# Patient Record
Sex: MALE | Race: BLACK OR AFRICAN AMERICAN | NOT HISPANIC OR LATINO | Employment: UNEMPLOYED | URBAN - METROPOLITAN AREA
[De-identification: names, ages, dates, MRNs, and addresses within clinical notes are randomized per-mention and may not be internally consistent; named-entity substitution may affect disease eponyms.]

---

## 2023-10-11 PROBLEM — N20.0 KIDNEY STONES, CALCIUM OXALATE: Status: ACTIVE | Noted: 2023-10-11

## 2023-10-11 PROBLEM — N32.89 BLADDER SPASMS: Status: ACTIVE | Noted: 2023-10-11

## 2023-10-11 RX ORDER — TAMSULOSIN HYDROCHLORIDE 0.4 MG/1
0.4 CAPSULE ORAL DAILY
COMMUNITY
Start: 2018-09-17

## 2023-10-11 RX ORDER — MELOXICAM 15 MG/1
1 TABLET ORAL DAILY
COMMUNITY
Start: 2018-09-17

## 2023-10-11 RX ORDER — OXYCODONE AND ACETAMINOPHEN 5; 325 MG/1; MG/1
1 TABLET ORAL EVERY 6 HOURS PRN
COMMUNITY
Start: 2018-08-29

## 2023-10-11 RX ORDER — OXYBUTYNIN CHLORIDE 5 MG/1
5 TABLET ORAL 3 TIMES DAILY
COMMUNITY

## 2024-05-06 ENCOUNTER — APPOINTMENT (OUTPATIENT)
Dept: RADIOLOGY | Facility: HOSPITAL | Age: 59
End: 2024-05-06
Payer: COMMERCIAL

## 2024-05-06 ENCOUNTER — HOSPITAL ENCOUNTER (EMERGENCY)
Facility: HOSPITAL | Age: 59
Discharge: HOME | End: 2024-05-06
Attending: STUDENT IN AN ORGANIZED HEALTH CARE EDUCATION/TRAINING PROGRAM
Payer: COMMERCIAL

## 2024-05-06 ENCOUNTER — CLINICAL SUPPORT (OUTPATIENT)
Dept: EMERGENCY MEDICINE | Facility: HOSPITAL | Age: 59
End: 2024-05-06
Payer: COMMERCIAL

## 2024-05-06 VITALS
BODY MASS INDEX: 21.53 KG/M2 | TEMPERATURE: 97.5 F | SYSTOLIC BLOOD PRESSURE: 190 MMHG | OXYGEN SATURATION: 98 % | HEIGHT: 66 IN | WEIGHT: 134 LBS | DIASTOLIC BLOOD PRESSURE: 105 MMHG | RESPIRATION RATE: 18 BRPM | HEART RATE: 67 BPM

## 2024-05-06 DIAGNOSIS — K40.90 INGUINAL HERNIA OF LEFT SIDE WITHOUT OBSTRUCTION OR GANGRENE: ICD-10-CM

## 2024-05-06 DIAGNOSIS — I10 HYPERTENSION, UNSPECIFIED TYPE: ICD-10-CM

## 2024-05-06 DIAGNOSIS — E87.6 HYPOKALEMIA: ICD-10-CM

## 2024-05-06 DIAGNOSIS — E83.39 HYPOPHOSPHATEMIA: ICD-10-CM

## 2024-05-06 DIAGNOSIS — L02.91 ABSCESS: ICD-10-CM

## 2024-05-06 DIAGNOSIS — R11.2 NAUSEA AND VOMITING, UNSPECIFIED VOMITING TYPE: Primary | ICD-10-CM

## 2024-05-06 LAB
ALBUMIN SERPL BCP-MCNC: 4.8 G/DL (ref 3.4–5)
ALP SERPL-CCNC: 78 U/L (ref 33–136)
ALT SERPL W P-5'-P-CCNC: 22 U/L (ref 10–52)
ANION GAP SERPL CALC-SCNC: 19 MMOL/L (ref 10–20)
AST SERPL W P-5'-P-CCNC: 35 U/L (ref 9–39)
BASOPHILS # BLD AUTO: 0.01 X10*3/UL (ref 0–0.1)
BASOPHILS NFR BLD AUTO: 0.1 %
BILIRUB SERPL-MCNC: 1.1 MG/DL (ref 0–1.2)
BUN SERPL-MCNC: 10 MG/DL (ref 6–23)
CALCIUM SERPL-MCNC: 10.4 MG/DL (ref 8.6–10.6)
CHLORIDE SERPL-SCNC: 94 MMOL/L (ref 98–107)
CO2 SERPL-SCNC: 28 MMOL/L (ref 21–32)
CREAT SERPL-MCNC: 0.9 MG/DL (ref 0.5–1.3)
EGFRCR SERPLBLD CKD-EPI 2021: >90 ML/MIN/1.73M*2
EOSINOPHIL # BLD AUTO: 0 X10*3/UL (ref 0–0.7)
EOSINOPHIL NFR BLD AUTO: 0 %
ERYTHROCYTE [DISTWIDTH] IN BLOOD BY AUTOMATED COUNT: 14.2 % (ref 11.5–14.5)
GLUCOSE SERPL-MCNC: 149 MG/DL (ref 74–99)
HCT VFR BLD AUTO: 50.2 % (ref 41–52)
HGB BLD-MCNC: 17.6 G/DL (ref 13.5–17.5)
IMM GRANULOCYTES # BLD AUTO: 0.04 X10*3/UL (ref 0–0.7)
IMM GRANULOCYTES NFR BLD AUTO: 0.4 % (ref 0–0.9)
LIPASE SERPL-CCNC: 23 U/L (ref 9–82)
LYMPHOCYTES # BLD AUTO: 1.38 X10*3/UL (ref 1.2–4.8)
LYMPHOCYTES NFR BLD AUTO: 13.6 %
MAGNESIUM SERPL-MCNC: 2.34 MG/DL (ref 1.6–2.4)
MCH RBC QN AUTO: 32.2 PG (ref 26–34)
MCHC RBC AUTO-ENTMCNC: 35.1 G/DL (ref 32–36)
MCV RBC AUTO: 92 FL (ref 80–100)
MONOCYTES # BLD AUTO: 0.95 X10*3/UL (ref 0.1–1)
MONOCYTES NFR BLD AUTO: 9.4 %
NEUTROPHILS # BLD AUTO: 7.78 X10*3/UL (ref 1.2–7.7)
NEUTROPHILS NFR BLD AUTO: 76.5 %
NRBC BLD-RTO: 0 /100 WBCS (ref 0–0)
PHOSPHATE SERPL-MCNC: 1.8 MG/DL (ref 2.5–4.9)
PLATELET # BLD AUTO: 400 X10*3/UL (ref 150–450)
POTASSIUM SERPL-SCNC: 3.3 MMOL/L (ref 3.5–5.3)
PROT SERPL-MCNC: 8.5 G/DL (ref 6.4–8.2)
RBC # BLD AUTO: 5.46 X10*6/UL (ref 4.5–5.9)
SARS-COV-2 RNA RESP QL NAA+PROBE: NOT DETECTED
SODIUM SERPL-SCNC: 138 MMOL/L (ref 136–145)
WBC # BLD AUTO: 10.2 X10*3/UL (ref 4.4–11.3)

## 2024-05-06 PROCEDURE — 85025 COMPLETE CBC W/AUTO DIFF WBC: CPT | Performed by: EMERGENCY MEDICINE

## 2024-05-06 PROCEDURE — 84100 ASSAY OF PHOSPHORUS: CPT | Performed by: STUDENT IN AN ORGANIZED HEALTH CARE EDUCATION/TRAINING PROGRAM

## 2024-05-06 PROCEDURE — 96361 HYDRATE IV INFUSION ADD-ON: CPT

## 2024-05-06 PROCEDURE — 87635 SARS-COV-2 COVID-19 AMP PRB: CPT

## 2024-05-06 PROCEDURE — 2500000004 HC RX 250 GENERAL PHARMACY W/ HCPCS (ALT 636 FOR OP/ED): Mod: SE | Performed by: STUDENT IN AN ORGANIZED HEALTH CARE EDUCATION/TRAINING PROGRAM

## 2024-05-06 PROCEDURE — 96365 THER/PROPH/DIAG IV INF INIT: CPT | Mod: 59

## 2024-05-06 PROCEDURE — 74177 CT ABD & PELVIS W/CONTRAST: CPT

## 2024-05-06 PROCEDURE — 99285 EMERGENCY DEPT VISIT HI MDM: CPT | Performed by: STUDENT IN AN ORGANIZED HEALTH CARE EDUCATION/TRAINING PROGRAM

## 2024-05-06 PROCEDURE — 99285 EMERGENCY DEPT VISIT HI MDM: CPT | Mod: 25

## 2024-05-06 PROCEDURE — 96366 THER/PROPH/DIAG IV INF ADDON: CPT

## 2024-05-06 PROCEDURE — 36415 COLL VENOUS BLD VENIPUNCTURE: CPT | Performed by: EMERGENCY MEDICINE

## 2024-05-06 PROCEDURE — 74177 CT ABD & PELVIS W/CONTRAST: CPT | Mod: FOREIGN READ | Performed by: RADIOLOGY

## 2024-05-06 PROCEDURE — 2500000004 HC RX 250 GENERAL PHARMACY W/ HCPCS (ALT 636 FOR OP/ED): Mod: SE

## 2024-05-06 PROCEDURE — 2550000001 HC RX 255 CONTRASTS: Mod: SE | Performed by: STUDENT IN AN ORGANIZED HEALTH CARE EDUCATION/TRAINING PROGRAM

## 2024-05-06 PROCEDURE — 85025 COMPLETE CBC W/AUTO DIFF WBC: CPT | Performed by: STUDENT IN AN ORGANIZED HEALTH CARE EDUCATION/TRAINING PROGRAM

## 2024-05-06 PROCEDURE — 2500000001 HC RX 250 WO HCPCS SELF ADMINISTERED DRUGS (ALT 637 FOR MEDICARE OP): Mod: SE

## 2024-05-06 PROCEDURE — 83735 ASSAY OF MAGNESIUM: CPT | Performed by: STUDENT IN AN ORGANIZED HEALTH CARE EDUCATION/TRAINING PROGRAM

## 2024-05-06 PROCEDURE — 2500000005 HC RX 250 GENERAL PHARMACY W/O HCPCS: Mod: SE | Performed by: STUDENT IN AN ORGANIZED HEALTH CARE EDUCATION/TRAINING PROGRAM

## 2024-05-06 PROCEDURE — 93005 ELECTROCARDIOGRAM TRACING: CPT

## 2024-05-06 PROCEDURE — 96375 TX/PRO/DX INJ NEW DRUG ADDON: CPT

## 2024-05-06 PROCEDURE — 83690 ASSAY OF LIPASE: CPT | Performed by: STUDENT IN AN ORGANIZED HEALTH CARE EDUCATION/TRAINING PROGRAM

## 2024-05-06 PROCEDURE — 82565 ASSAY OF CREATININE: CPT | Performed by: STUDENT IN AN ORGANIZED HEALTH CARE EDUCATION/TRAINING PROGRAM

## 2024-05-06 RX ORDER — AMLODIPINE BESYLATE 5 MG/1
10 TABLET ORAL DAILY
Status: DISCONTINUED | OUTPATIENT
Start: 2024-05-06 | End: 2024-05-06 | Stop reason: HOSPADM

## 2024-05-06 RX ORDER — AMLODIPINE BESYLATE 10 MG/1
10 TABLET ORAL DAILY
Qty: 30 TABLET | Refills: 0 | Status: SHIPPED | OUTPATIENT
Start: 2024-05-06 | End: 2024-06-05

## 2024-05-06 RX ORDER — AMOXICILLIN 875 MG/1
875 TABLET, FILM COATED ORAL 2 TIMES DAILY
Qty: 20 TABLET | Refills: 0 | Status: SHIPPED | OUTPATIENT
Start: 2024-05-06 | End: 2024-05-16

## 2024-05-06 RX ORDER — POTASSIUM CHLORIDE 14.9 MG/ML
20 INJECTION INTRAVENOUS
Status: COMPLETED | OUTPATIENT
Start: 2024-05-06 | End: 2024-05-06

## 2024-05-06 RX ORDER — HYDROCHLOROTHIAZIDE 25 MG/1
12.5 TABLET ORAL DAILY
Qty: 15 TABLET | Refills: 0 | Status: SHIPPED | OUTPATIENT
Start: 2024-05-06 | End: 2024-06-05

## 2024-05-06 RX ORDER — DROPERIDOL 2.5 MG/ML
1.25 INJECTION, SOLUTION INTRAMUSCULAR; INTRAVENOUS ONCE
Status: COMPLETED | OUTPATIENT
Start: 2024-05-06 | End: 2024-05-06

## 2024-05-06 RX ORDER — MORPHINE SULFATE 4 MG/ML
4 INJECTION INTRAVENOUS ONCE
Status: COMPLETED | OUTPATIENT
Start: 2024-05-06 | End: 2024-05-06

## 2024-05-06 RX ORDER — ONDANSETRON HYDROCHLORIDE 2 MG/ML
4 INJECTION, SOLUTION INTRAVENOUS ONCE
Status: COMPLETED | OUTPATIENT
Start: 2024-05-06 | End: 2024-05-06

## 2024-05-06 RX ORDER — NAPROXEN SODIUM 220 MG/1
81 TABLET, FILM COATED ORAL DAILY
Qty: 60 TABLET | Refills: 0 | Status: SHIPPED | OUTPATIENT
Start: 2024-05-06 | End: 2024-07-05

## 2024-05-06 RX ADMIN — AMLODIPINE BESYLATE 10 MG: 5 TABLET ORAL at 15:23

## 2024-05-06 RX ADMIN — DIBASIC SODIUM PHOSPHATE, MONOBASIC POTASSIUM PHOSPHATE AND MONOBASIC SODIUM PHOSPHATE 500 MG: 852; 155; 130 TABLET ORAL at 16:34

## 2024-05-06 RX ADMIN — POTASSIUM CHLORIDE 20 MEQ: 14.9 INJECTION, SOLUTION INTRAVENOUS at 14:52

## 2024-05-06 RX ADMIN — POTASSIUM CHLORIDE 20 MEQ: 14.9 INJECTION, SOLUTION INTRAVENOUS at 12:52

## 2024-05-06 RX ADMIN — MORPHINE SULFATE 4 MG: 4 INJECTION INTRAVENOUS at 13:37

## 2024-05-06 RX ADMIN — SODIUM CHLORIDE, POTASSIUM CHLORIDE, SODIUM LACTATE AND CALCIUM CHLORIDE 1000 ML: 600; 310; 30; 20 INJECTION, SOLUTION INTRAVENOUS at 12:20

## 2024-05-06 RX ADMIN — SODIUM CHLORIDE, POTASSIUM CHLORIDE, SODIUM LACTATE AND CALCIUM CHLORIDE 1000 ML: 600; 310; 30; 20 INJECTION, SOLUTION INTRAVENOUS at 14:49

## 2024-05-06 RX ADMIN — DROPERIDOL 1.25 MG: 2.5 INJECTION, SOLUTION INTRAMUSCULAR; INTRAVENOUS at 13:37

## 2024-05-06 RX ADMIN — IOHEXOL 80 ML: 350 INJECTION, SOLUTION INTRAVENOUS at 13:29

## 2024-05-06 RX ADMIN — ONDANSETRON 4 MG: 2 INJECTION INTRAMUSCULAR; INTRAVENOUS at 12:49

## 2024-05-06 RX ADMIN — POTASSIUM PHOSPHATE, MONOBASIC POTASSIUM PHOSPHATE, DIBASIC 21 MMOL: 224; 236 INJECTION, SOLUTION, CONCENTRATE INTRAVENOUS at 14:42

## 2024-05-06 ASSESSMENT — COLUMBIA-SUICIDE SEVERITY RATING SCALE - C-SSRS
1. IN THE PAST MONTH, HAVE YOU WISHED YOU WERE DEAD OR WISHED YOU COULD GO TO SLEEP AND NOT WAKE UP?: NO
6. HAVE YOU EVER DONE ANYTHING, STARTED TO DO ANYTHING, OR PREPARED TO DO ANYTHING TO END YOUR LIFE?: NO
2. HAVE YOU ACTUALLY HAD ANY THOUGHTS OF KILLING YOURSELF?: NO

## 2024-05-06 ASSESSMENT — PAIN SCALES - GENERAL: PAINLEVEL_OUTOF10: 3

## 2024-05-06 NOTE — PROGRESS NOTES
Joe Alaniz is a 58 y.o. male with a past medical history of BPH, hypertension, nephrolithiasis and anxiety that presents to the emergency department today for vomiting.  This patient was a signout to me and during the time of signout patient was receiving IV phosphorus.  The IV phosphorus was stopped and patient received oral phosphorus.  After reassessment of the patient, patient is able to tolerate p.o. and was ready for discharge.  I wrote patient a 30-day prescription of his aspirin, hydrochlorothiazide, and amlodipine.  He also received 10-day amoxicillin prescription for his abscess.  Patient was then discharged.

## 2024-05-06 NOTE — ED TRIAGE NOTES
Pt presents to the ED for n/v that started a couple days ago. Pt states that he has been unable to keep anything down for three days, including his BP medications. Pt states that he also has a hernia that he was supposed to have surgically resolved but has not received yet.

## 2024-05-06 NOTE — ED PROVIDER NOTES
CC: Vomiting     History provided by: Patient  Limitations to History: None    HPI:  Joe Alaniz is a 58 y.o. male with a past medical history of BPH, hypertension, nephrolithiasis, inguinal hernia and anxiety that presents to the emergency department today for vomiting.  Patient states that he has had multiple episodes of vomiting daily for the past 3 days and noticed some small flecks of blood present in his vomit this morning.  The patient does have a family member at home that is sick with similar symptoms and he has had some subjective fevers and chills associated with this.  The patient has a known history of an inguinal hernia but has continued to pass gas and have normal bowel movements.  He does complain of some mild epigastric pain that is described as cramping and comes and goes with his nausea and vomiting.  The patient has been unable to take his blood pressure medications at home due to his vomiting.    ???????????????????????????????????????????????????????????????  Triage Vitals:  T 36.4 °C (97.5 °F)  HR (!) 126  BP (!) 145/92  RR 16  O2 98 % None (Room air)    Vital signs reviewed in nursing triage note, EMR flow sheets, and at patient's bedside.   General: Awake, alert, in no acute distress  Eyes: Gaze conjugate.  No scleral icterus or injection  HENT: Normo-cephalic, atraumatic. No stridor. No rhinorrhea or epistaxis.  CV: Regular rhythm. No murmurs appreciated. Radial pulses 2+ bilaterally  Respiratory: Breathing non-labored, speaking in full sentences.  Clear to auscultation bilaterally  GI: Mild epigastric tenderness palpation with no rebound or guarding.  No other significant abdominal tenderness.  : Left inguinal hernia which is soft and nontender to palpation with no evidence of strangulation.  MSK/Extremities: No gross bony deformities. Moving all extremities. No lower extremity edema.  Skin: Warm. Appropriate color  Neuro: Alert. Oriented. Face symmetric. Speech is fluent.  Gross  strength and sensation intact in b/l UE and LEs  Psych: Appropriate mood and affect   ???????????????????????????????????????????????????????????????  ED Course/Treatment/Medical Decision Making  MDM:  Joe Alaniz is a 58 y.o. male with a past medical history of hypertension and known inguinal hernia that presents to the emergency department today for vomiting x 3 days.  The patient symptoms are most consistent with viral syndrome given that there is another family member in the house sick with similar symptoms.  Upon arrival to the emergency department the patient was tachycardic with a heart rate of 126 and mildly hypertensive but had otherwise stable vital signs.  Physical exam was relatively benign but did reveal some mild epigastric abdominal tenderness without evidence of peritonitis as well as a nonstrangulated left inguinal hernia.  Appropriate labs,  EKG and imaging were ordered and the patient was given 2 L of IV fluids, Zofran and a dose of morphine.    Differential diagnoses considered include but are not limited to: Viral syndrome, GERD, gastric ulcer, perforated viscus, cholecystitis, appendicitis, mesenteric ischemia    Independent Interpretation of Studies:  I independently interpreted:   -Labs   -EKG  -CT abdomen and pelvis    ED Course:  ED Course as of 05/09/24 1503   Mon May 06, 2024   1220 ECG 12 lead  EKG shows sinus tachycardia at a rate of 118 bpm with left axis deviation.  UT interval is within normal limits.  Evidence of biatrial enlargement.  QRS is mildly prolonged at 102 ms and QTc is prolonged at 476 ms.  No ST elevation or T wave inversions appreciated.  Left anterior fascicular block unchanged from previous EKG. No STEMI.  Biatrial enlargement is new from previous EKG noted on 9/24/2023 but otherwise no significant changes. [RS]   1343 Senior Resident Attestation  Patient seen and discussed with efrem resident.  I have independently evaluated the patient and reviewed all necessary  laboratory and radiographic results.    58-year-old male with history of hypertension presented to the emergency department with 3 days of nausea, vomiting, epigastric pain.  Does have a history of prior gastric ulcer with prior intra-abdominal surgery.  States last bowel movement on Friday, not passing flatus today, has not eaten much in the past 2 days.  Denies hematemesis.  Mild diffuse abdominal tenderness, nonperitoneal, evaluation.  Will obtain CT abdomen pelvis.  Blood work consistent with dehydration.  Will provide IV fluids, IV morphine, IV antiemetics.    Patient seen and discussed with ED attending physician.    Guillermo Mcgovern MD  PGY 3 Emergency Medicine [SH]   1433 CT abdomen pelvis w IV contrast  CT abdomen pelvis shows left inguinal hernia with loops of bowel but no evidence of strangulation or obstruction. [RS]   1436 Patient reevaluated feeling improved after IV fluids and droperidol.  Will give an additional liter of fluids given significant dehydration and reassess. [RS]      ED Course User Index  [RS] Gonzalo Dodson,   [SH] Chidi Mcgovern MD         Diagnoses as of 05/09/24 1503   Hypertension, unspecified type   Abscess   Nausea and vomiting, unspecified vomiting type   Hypokalemia   Hypophosphatemia   Inguinal hernia of left side without obstruction or gangrene     The patient was monitored for any change in vital signs or symptoms and heart rate improved after IV fluids to the 60s-70s.  The patient did continue to complain of nausea despite IV Zofran and was given a dose of droperidol which significantly improved his symptoms.  CT scan revealed the known inguinal hernia without evidence of strangulation and no other significant abnormalities.  Labs were remarkable for hypokalemia and hypophosphatemia and these were repleted here in the emergency department.  The patient was given his home dose of amlodipine for his blood pressure as he did become more hypertensive throughout the ED course but  remains neurologically intact with no focal deficits or other concerning features.  The patient was signed out to the oncoming provider pending p.o. challenge and final disposition.  He will likely need refills for his blood pressure medications as he has lost follow-up with his primary care provider.  Patient is encouraged to reach out to his surgeon that he followed with for his inguinal hernia as he had surgery scheduled but missed it due to travel or sick family member in the past.  The patient was signed out to the oncoming provider in stable condition.    Social Determinants Limiting Care:  None identified    Impression:  Nausea and vomiting  Hypokalemia  Hypophosphatemia  History of inguinal hernia  Hypertension    Disposition:  Signed out to oncoming provider    Assessment and plan discussed with Dr. Guillermo Mcgovern and Carla Dodson DO   Emergency Medicine, PGY-1     Disclaimer: This note was dictated by speech recognition. Minor errors in transcription may be present.     Procedures ? [employer] last updated 5/9/2024 3:03 PM        Gonzalo Dodson DO  Resident  05/09/24 1078

## 2024-05-07 LAB
ATRIAL RATE: 118 BPM
P AXIS: 76 DEGREES
P OFFSET: 210 MS
P ONSET: 144 MS
PR INTERVAL: 158 MS
Q ONSET: 223 MS
QRS COUNT: 20 BEATS
QRS DURATION: 102 MS
QT INTERVAL: 340 MS
QTC CALCULATION(BAZETT): 476 MS
QTC FREDERICIA: 426 MS
R AXIS: -64 DEGREES
T AXIS: 66 DEGREES
T OFFSET: 393 MS
VENTRICULAR RATE: 118 BPM

## 2024-07-02 ENCOUNTER — APPOINTMENT (OUTPATIENT)
Dept: RADIOLOGY | Facility: HOSPITAL | Age: 59
End: 2024-07-02
Payer: COMMERCIAL

## 2024-07-02 ENCOUNTER — HOSPITAL ENCOUNTER (OUTPATIENT)
Facility: HOSPITAL | Age: 59
Setting detail: OBSERVATION
Discharge: HOME | End: 2024-07-03
Attending: EMERGENCY MEDICINE | Admitting: EMERGENCY MEDICINE
Payer: COMMERCIAL

## 2024-07-02 ENCOUNTER — CLINICAL SUPPORT (OUTPATIENT)
Dept: EMERGENCY MEDICINE | Facility: HOSPITAL | Age: 59
End: 2024-07-02
Payer: COMMERCIAL

## 2024-07-02 DIAGNOSIS — K40.90 LEFT INGUINAL HERNIA: ICD-10-CM

## 2024-07-02 DIAGNOSIS — R11.2 NAUSEA AND VOMITING, UNSPECIFIED VOMITING TYPE: ICD-10-CM

## 2024-07-02 DIAGNOSIS — E87.6 HYPOKALEMIA: Primary | ICD-10-CM

## 2024-07-02 DIAGNOSIS — K12.1 STOMATITIS: ICD-10-CM

## 2024-07-02 LAB
ALBUMIN SERPL BCP-MCNC: 4.4 G/DL (ref 3.4–5)
ALP SERPL-CCNC: 54 U/L (ref 33–136)
ALT SERPL W P-5'-P-CCNC: 34 U/L (ref 10–52)
ANION GAP SERPL CALC-SCNC: 24 MMOL/L (ref 10–20)
APPEARANCE UR: CLEAR
AST SERPL W P-5'-P-CCNC: 40 U/L (ref 9–39)
BASOPHILS # BLD AUTO: 0.04 X10*3/UL (ref 0–0.1)
BASOPHILS NFR BLD AUTO: 0.5 %
BILIRUB SERPL-MCNC: 1 MG/DL (ref 0–1.2)
BILIRUB UR STRIP.AUTO-MCNC: ABNORMAL MG/DL
BUN SERPL-MCNC: 8 MG/DL (ref 6–23)
CALCIUM SERPL-MCNC: 9.7 MG/DL (ref 8.6–10.6)
CHLORIDE SERPL-SCNC: 91 MMOL/L (ref 98–107)
CO2 SERPL-SCNC: 27 MMOL/L (ref 21–32)
COLOR UR: YELLOW
CREAT SERPL-MCNC: 0.7 MG/DL (ref 0.5–1.3)
EGFRCR SERPLBLD CKD-EPI 2021: >90 ML/MIN/1.73M*2
EOSINOPHIL # BLD AUTO: 0.01 X10*3/UL (ref 0–0.7)
EOSINOPHIL NFR BLD AUTO: 0.1 %
ERYTHROCYTE [DISTWIDTH] IN BLOOD BY AUTOMATED COUNT: 12.4 % (ref 11.5–14.5)
FLUAV RNA RESP QL NAA+PROBE: NOT DETECTED
FLUBV RNA RESP QL NAA+PROBE: NOT DETECTED
GLUCOSE SERPL-MCNC: 122 MG/DL (ref 74–99)
GLUCOSE UR STRIP.AUTO-MCNC: NORMAL MG/DL
HCT VFR BLD AUTO: 36.4 % (ref 41–52)
HGB BLD-MCNC: 13.2 G/DL (ref 13.5–17.5)
HIV 1+2 AB+HIV1 P24 AG SERPL QL IA: NONREACTIVE
HYALINE CASTS #/AREA URNS AUTO: ABNORMAL /LPF
IMM GRANULOCYTES # BLD AUTO: 0.02 X10*3/UL (ref 0–0.7)
IMM GRANULOCYTES NFR BLD AUTO: 0.2 % (ref 0–0.9)
KETONES UR STRIP.AUTO-MCNC: ABNORMAL MG/DL
LEUKOCYTE ESTERASE UR QL STRIP.AUTO: NEGATIVE
LIPASE SERPL-CCNC: 14 U/L (ref 9–82)
LYMPHOCYTES # BLD AUTO: 1.39 X10*3/UL (ref 1.2–4.8)
LYMPHOCYTES NFR BLD AUTO: 17.1 %
MAGNESIUM SERPL-MCNC: 1.75 MG/DL (ref 1.6–2.4)
MCH RBC QN AUTO: 33.2 PG (ref 26–34)
MCHC RBC AUTO-ENTMCNC: 36.3 G/DL (ref 32–36)
MCV RBC AUTO: 92 FL (ref 80–100)
MONOCYTES # BLD AUTO: 0.69 X10*3/UL (ref 0.1–1)
MONOCYTES NFR BLD AUTO: 8.5 %
MUCOUS THREADS #/AREA URNS AUTO: ABNORMAL /LPF
NEUTROPHILS # BLD AUTO: 5.99 X10*3/UL (ref 1.2–7.7)
NEUTROPHILS NFR BLD AUTO: 73.6 %
NITRITE UR QL STRIP.AUTO: NEGATIVE
NRBC BLD-RTO: 0 /100 WBCS (ref 0–0)
PH UR STRIP.AUTO: 6.5 [PH]
PLATELET # BLD AUTO: 427 X10*3/UL (ref 150–450)
POTASSIUM SERPL-SCNC: 2.8 MMOL/L (ref 3.5–5.3)
PROT SERPL-MCNC: 7.7 G/DL (ref 6.4–8.2)
PROT UR STRIP.AUTO-MCNC: ABNORMAL MG/DL
RBC # BLD AUTO: 3.97 X10*6/UL (ref 4.5–5.9)
RBC # UR STRIP.AUTO: NEGATIVE /UL
RBC #/AREA URNS AUTO: ABNORMAL /HPF
RSV RNA RESP QL NAA+PROBE: NOT DETECTED
S PYO DNA THROAT QL NAA+PROBE: NOT DETECTED
SARS-COV-2 RNA RESP QL NAA+PROBE: NOT DETECTED
SODIUM SERPL-SCNC: 139 MMOL/L (ref 136–145)
SP GR UR STRIP.AUTO: 1.03
TREPONEMA PALLIDUM IGG+IGM AB [PRESENCE] IN SERUM OR PLASMA BY IMMUNOASSAY: NONREACTIVE
UROBILINOGEN UR STRIP.AUTO-MCNC: ABNORMAL MG/DL
WBC # BLD AUTO: 8.1 X10*3/UL (ref 4.4–11.3)
WBC #/AREA URNS AUTO: ABNORMAL /HPF

## 2024-07-02 PROCEDURE — 99285 EMERGENCY DEPT VISIT HI MDM: CPT | Performed by: NURSE PRACTITIONER

## 2024-07-02 PROCEDURE — 99285 EMERGENCY DEPT VISIT HI MDM: CPT | Mod: 25

## 2024-07-02 PROCEDURE — 2500000004 HC RX 250 GENERAL PHARMACY W/ HCPCS (ALT 636 FOR OP/ED): Mod: SE

## 2024-07-02 PROCEDURE — 83690 ASSAY OF LIPASE: CPT | Performed by: NURSE PRACTITIONER

## 2024-07-02 PROCEDURE — 87521 HEPATITIS C PROBE&RVRS TRNSC: CPT | Performed by: NURSE PRACTITIONER

## 2024-07-02 PROCEDURE — 36415 COLL VENOUS BLD VENIPUNCTURE: CPT | Performed by: NURSE PRACTITIONER

## 2024-07-02 PROCEDURE — 96376 TX/PRO/DX INJ SAME DRUG ADON: CPT | Mod: 59

## 2024-07-02 PROCEDURE — 93005 ELECTROCARDIOGRAM TRACING: CPT

## 2024-07-02 PROCEDURE — 74177 CT ABD & PELVIS W/CONTRAST: CPT

## 2024-07-02 PROCEDURE — 96365 THER/PROPH/DIAG IV INF INIT: CPT | Mod: 59

## 2024-07-02 PROCEDURE — 83735 ASSAY OF MAGNESIUM: CPT | Performed by: EMERGENCY MEDICINE

## 2024-07-02 PROCEDURE — 96375 TX/PRO/DX INJ NEW DRUG ADDON: CPT | Mod: 59

## 2024-07-02 PROCEDURE — 80053 COMPREHEN METABOLIC PANEL: CPT | Performed by: NURSE PRACTITIONER

## 2024-07-02 PROCEDURE — 70491 CT SOFT TISSUE NECK W/DYE: CPT | Performed by: RADIOLOGY

## 2024-07-02 PROCEDURE — 81001 URINALYSIS AUTO W/SCOPE: CPT | Performed by: NURSE PRACTITIONER

## 2024-07-02 PROCEDURE — 96366 THER/PROPH/DIAG IV INF ADDON: CPT

## 2024-07-02 PROCEDURE — 85025 COMPLETE CBC W/AUTO DIFF WBC: CPT | Performed by: NURSE PRACTITIONER

## 2024-07-02 PROCEDURE — 86780 TREPONEMA PALLIDUM: CPT | Performed by: NURSE PRACTITIONER

## 2024-07-02 PROCEDURE — 2500000004 HC RX 250 GENERAL PHARMACY W/ HCPCS (ALT 636 FOR OP/ED): Mod: SE | Performed by: NURSE PRACTITIONER

## 2024-07-02 PROCEDURE — 87637 SARSCOV2&INF A&B&RSV AMP PRB: CPT | Performed by: EMERGENCY MEDICINE

## 2024-07-02 PROCEDURE — 87651 STREP A DNA AMP PROBE: CPT | Performed by: NURSE PRACTITIONER

## 2024-07-02 PROCEDURE — 70491 CT SOFT TISSUE NECK W/DYE: CPT

## 2024-07-02 PROCEDURE — G0378 HOSPITAL OBSERVATION PER HR: HCPCS

## 2024-07-02 PROCEDURE — 96361 HYDRATE IV INFUSION ADD-ON: CPT

## 2024-07-02 PROCEDURE — 87389 HIV-1 AG W/HIV-1&-2 AB AG IA: CPT | Performed by: NURSE PRACTITIONER

## 2024-07-02 PROCEDURE — 2550000001 HC RX 255 CONTRASTS: Mod: SE | Performed by: EMERGENCY MEDICINE

## 2024-07-02 PROCEDURE — 2500000002 HC RX 250 W HCPCS SELF ADMINISTERED DRUGS (ALT 637 FOR MEDICARE OP, ALT 636 FOR OP/ED): Mod: SE | Performed by: NURSE PRACTITIONER

## 2024-07-02 PROCEDURE — 74177 CT ABD & PELVIS W/CONTRAST: CPT | Performed by: SURGERY

## 2024-07-02 RX ORDER — FAMOTIDINE 10 MG/ML
20 INJECTION INTRAVENOUS 2 TIMES DAILY
Status: DISCONTINUED | OUTPATIENT
Start: 2024-07-02 | End: 2024-07-03 | Stop reason: HOSPADM

## 2024-07-02 RX ORDER — POTASSIUM CHLORIDE 20 MEQ/1
20 TABLET, EXTENDED RELEASE ORAL ONCE
Status: COMPLETED | OUTPATIENT
Start: 2024-07-02 | End: 2024-07-02

## 2024-07-02 RX ORDER — ONDANSETRON HYDROCHLORIDE 2 MG/ML
4 INJECTION, SOLUTION INTRAVENOUS EVERY 8 HOURS PRN
Status: DISCONTINUED | OUTPATIENT
Start: 2024-07-02 | End: 2024-07-03 | Stop reason: HOSPADM

## 2024-07-02 RX ORDER — MORPHINE SULFATE 4 MG/ML
INJECTION INTRAVENOUS
Status: COMPLETED
Start: 2024-07-02 | End: 2024-07-02

## 2024-07-02 RX ORDER — ACETAMINOPHEN 325 MG/1
650 TABLET ORAL EVERY 4 HOURS PRN
Status: DISCONTINUED | OUTPATIENT
Start: 2024-07-02 | End: 2024-07-03 | Stop reason: HOSPADM

## 2024-07-02 RX ORDER — FAMOTIDINE 20 MG/1
20 TABLET, FILM COATED ORAL 2 TIMES DAILY
Status: DISCONTINUED | OUTPATIENT
Start: 2024-07-02 | End: 2024-07-03 | Stop reason: HOSPADM

## 2024-07-02 RX ORDER — MORPHINE SULFATE 4 MG/ML
2 INJECTION INTRAVENOUS ONCE
Status: COMPLETED | OUTPATIENT
Start: 2024-07-02 | End: 2024-07-02

## 2024-07-02 RX ORDER — ACETAMINOPHEN 650 MG/1
650 SUPPOSITORY RECTAL EVERY 4 HOURS PRN
Status: DISCONTINUED | OUTPATIENT
Start: 2024-07-02 | End: 2024-07-03 | Stop reason: HOSPADM

## 2024-07-02 RX ORDER — ONDANSETRON 4 MG/1
4 TABLET, ORALLY DISINTEGRATING ORAL EVERY 8 HOURS PRN
Status: DISCONTINUED | OUTPATIENT
Start: 2024-07-02 | End: 2024-07-03 | Stop reason: HOSPADM

## 2024-07-02 RX ORDER — PANTOPRAZOLE SODIUM 40 MG/1
1 TABLET, DELAYED RELEASE ORAL
COMMUNITY
Start: 2023-09-25 | End: 2024-07-10 | Stop reason: ENTERED-IN-ERROR

## 2024-07-02 RX ORDER — POTASSIUM CHLORIDE 14.9 MG/ML
20 INJECTION INTRAVENOUS
Status: COMPLETED | OUTPATIENT
Start: 2024-07-02 | End: 2024-07-03

## 2024-07-02 RX ORDER — ONDANSETRON HYDROCHLORIDE 2 MG/ML
4 INJECTION, SOLUTION INTRAVENOUS ONCE
Status: COMPLETED | OUTPATIENT
Start: 2024-07-02 | End: 2024-07-02

## 2024-07-02 RX ORDER — ACETAMINOPHEN 160 MG/5ML
650 SOLUTION ORAL EVERY 4 HOURS PRN
Status: DISCONTINUED | OUTPATIENT
Start: 2024-07-02 | End: 2024-07-03 | Stop reason: HOSPADM

## 2024-07-02 RX ADMIN — IOHEXOL 80 ML: 350 INJECTION, SOLUTION INTRAVENOUS at 18:34

## 2024-07-02 RX ADMIN — POTASSIUM CHLORIDE 20 MEQ: 14.9 INJECTION, SOLUTION INTRAVENOUS at 22:29

## 2024-07-02 RX ADMIN — SODIUM CHLORIDE 1000 ML: 9 INJECTION, SOLUTION INTRAVENOUS at 17:05

## 2024-07-02 RX ADMIN — POTASSIUM CHLORIDE 20 MEQ: 1500 TABLET, EXTENDED RELEASE ORAL at 20:40

## 2024-07-02 RX ADMIN — ONDANSETRON 4 MG: 2 INJECTION INTRAMUSCULAR; INTRAVENOUS at 17:07

## 2024-07-02 RX ADMIN — POTASSIUM CHLORIDE 20 MEQ: 14.9 INJECTION, SOLUTION INTRAVENOUS at 20:41

## 2024-07-02 RX ADMIN — ONDANSETRON 4 MG: 2 INJECTION INTRAMUSCULAR; INTRAVENOUS at 22:50

## 2024-07-02 RX ADMIN — FAMOTIDINE 20 MG: 10 INJECTION INTRAVENOUS at 22:50

## 2024-07-02 RX ADMIN — MORPHINE SULFATE 2 MG: 4 INJECTION, SOLUTION INTRAMUSCULAR; INTRAVENOUS at 22:28

## 2024-07-02 RX ADMIN — MORPHINE SULFATE 2 MG: 4 INJECTION, SOLUTION INTRAMUSCULAR; INTRAVENOUS at 17:08

## 2024-07-02 RX ADMIN — MORPHINE SULFATE 2 MG: 4 INJECTION, SOLUTION INTRAMUSCULAR; INTRAVENOUS at 22:18

## 2024-07-02 ASSESSMENT — ENCOUNTER SYMPTOMS
FEVER: 0
WHEEZING: 0
SHORTNESS OF BREATH: 0
DIARRHEA: 0
ABDOMINAL PAIN: 1
CHILLS: 0
DYSURIA: 0
VOMITING: 1
NUMBNESS: 0
DIZZINESS: 0
SORE THROAT: 1
VOICE CHANGE: 0
COUGH: 0
RHINORRHEA: 0
NAUSEA: 1
HEADACHES: 0

## 2024-07-02 ASSESSMENT — PAIN SCALES - PAIN ASSESSMENT IN ADVANCED DEMENTIA (PAINAD)
FACIALEXPRESSION: FACIAL GRIMACING
TOTALSCORE: OTHER (COMMENT)
BREATHING: NORMAL
TOTALSCORE: 3
CONSOLABILITY: NO NEED TO CONSOLE
BODYLANGUAGE: TENSE, DISTRESSED PACING, FIDGETING

## 2024-07-02 ASSESSMENT — PAIN SCALES - GENERAL
PAINLEVEL_OUTOF10: 10 - WORST POSSIBLE PAIN
PAINLEVEL_OUTOF10: 8

## 2024-07-02 ASSESSMENT — PAIN DESCRIPTION - LOCATION
LOCATION: THROAT
LOCATION: MOUTH
LOCATION: MOUTH

## 2024-07-02 ASSESSMENT — PAIN DESCRIPTION - PAIN TYPE: TYPE: ACUTE PAIN

## 2024-07-02 ASSESSMENT — COLUMBIA-SUICIDE SEVERITY RATING SCALE - C-SSRS
1. IN THE PAST MONTH, HAVE YOU WISHED YOU WERE DEAD OR WISHED YOU COULD GO TO SLEEP AND NOT WAKE UP?: NO
2. HAVE YOU ACTUALLY HAD ANY THOUGHTS OF KILLING YOURSELF?: NO
6. HAVE YOU EVER DONE ANYTHING, STARTED TO DO ANYTHING, OR PREPARED TO DO ANYTHING TO END YOUR LIFE?: NO

## 2024-07-02 ASSESSMENT — PAIN - FUNCTIONAL ASSESSMENT
PAIN_FUNCTIONAL_ASSESSMENT: 0-10
PAIN_FUNCTIONAL_ASSESSMENT: 0-10

## 2024-07-02 NOTE — ED PROVIDER NOTES
HPI   Chief Complaint   Patient presents with    Mouth Lesions       HPI 58 year old  male with hx of HTN presents to the ED with complaints of significant pain to mouth with sores x 4 days. States it is painful to swallow and has not been able to tolerate food for the last few days. Has been able to drink some liquids from straw, however has been experiencing nausea and vomiting. Reports approx 3-4 episodes of vomiting per day with some associated abdominal discomfort. Denies fever/chills, cough, congestion, rhinorrhea, CP, SOB, diarrhea or urinary symptoms. Denies being sexually active, denies genital sores or concern for STI. Denies being immunocompromised. No hx of cancer/chemotherapy. Denies ETOH or drug use.        Review of Systems   Constitutional:  Negative for chills and fever.   HENT:  Positive for sore throat. Negative for congestion, ear pain, rhinorrhea and voice change.    Respiratory:  Negative for cough, shortness of breath and wheezing.    Gastrointestinal:  Positive for abdominal pain, nausea and vomiting. Negative for diarrhea.   Genitourinary:  Negative for dysuria.   Neurological:  Negative for dizziness, numbness and headaches.                  [place] Coma Scale Score: 15                     Patient History   No past medical history on file.  No past surgical history on file.  No family history on file.  Social History     Tobacco Use    Smoking status: Not on file    Smokeless tobacco: Not on file   Substance Use Topics    Alcohol use: Not on file    Drug use: Not on file       Physical Exam   ED Triage Vitals [07/02/24 1516]   Temperature Heart Rate Respirations BP   36.3 °C (97.3 °F) 100 17 (!) 143/100      Pulse Ox Temp Source Heart Rate Source Patient Position   98 % Temporal Monitor Sitting      BP Location FiO2 (%)     Left arm --       Physical Exam  Vitals reviewed.   Constitutional:       Appearance: He is not ill-appearing.   HENT:      Head: Normocephalic and  atraumatic.      Right Ear: Tympanic membrane, ear canal and external ear normal.      Left Ear: Tympanic membrane, ear canal and external ear normal.      Mouth/Throat:      Mouth: Mucous membranes are dry.      Dentition: No dental abscesses.      Tongue: No lesions.      Pharynx: Uvula midline. Posterior oropharyngeal erythema present. No oropharyngeal exudate or uvula swelling.      Tonsils: No tonsillar exudate or tonsillar abscesses. 2+ on the right. 2+ on the left.      Comments: 2 small ulcers noted to inner aspect of lower lip. Oral mucosa  appears dry, erythematous  Eyes:      Extraocular Movements: Extraocular movements intact.      Pupils: Pupils are equal, round, and reactive to light.   Cardiovascular:      Rate and Rhythm: Normal rate and regular rhythm.      Heart sounds: Normal heart sounds.   Pulmonary:      Effort: Pulmonary effort is normal. No respiratory distress.      Breath sounds: Normal breath sounds. No wheezing, rhonchi or rales.   Abdominal:      General: Bowel sounds are normal. There is no distension.      Palpations: Abdomen is soft. There is no mass.      Tenderness: There is abdominal tenderness. There is no guarding or rebound.   Musculoskeletal:         General: Normal range of motion.      Cervical back: Neck supple.   Lymphadenopathy:      Cervical: Cervical adenopathy present.   Skin:     General: Skin is warm and dry.      Findings: No rash.   Neurological:      General: No focal deficit present.      Mental Status: He is alert and oriented to person, place, and time.         ED Course & MDM   Diagnoses as of 07/02/24 1912   Hypokalemia   Stomatitis   Nausea and vomiting, unspecified vomiting type       Medical Decision Making  58 year old male with hx of HTN presenting to the ED with complaints of painful mouth with sores and pain associated with swallowing x 1 week. Two mucosal shallow sores noted to inner lower lip. Reports that he has had difficulty tolerating PO over the  last few days due to pain with eating. Has been experiencing nausea and vomiting approx 2-3 episodes per day along with some abdominal discomfort.  Denies hx of HIV or immunosuppression. Patient is afebrile, nontoxic , appears dehydrated. COVID, FLU, RSV, strep all returned negative. HIV, Syphilis, HCV obtained and pending. Abdomen soft, with some tenderness to RUQ, RLQ. He was given a 1L NS bolus along with Morphine and Zofran for pain control. CBC with normal WBC Of 8.1. Hgb 13.2. CMP with low potassium of 2.8. Glucose 122. BUN 8 , Cr 0.7. Magnesium and Lipase WNL. Patient was given a total of 60mEQ KCL. Given cervical lymphadenopathy with stomatitis will obtain CT neck to rule out possible malignancy. CT abd/pelvis ordered for further evaluation. Plan of care will be taken over by oncoming provider at 1900. Plan of care pending imaging results. Anticipate hospital admission for K replacement, IV hydration.     Procedure  Procedures     SMITHA Simon-JOSE  07/02/24 1914

## 2024-07-02 NOTE — ED TRIAGE NOTES
Pt to ED with c/o painful mouth x4 days. Pt also endorses decrease appetite due to the mouth sores. PMH HTN.

## 2024-07-03 ENCOUNTER — APPOINTMENT (OUTPATIENT)
Dept: CARDIOLOGY | Facility: HOSPITAL | Age: 59
End: 2024-07-03
Payer: COMMERCIAL

## 2024-07-03 VITALS
SYSTOLIC BLOOD PRESSURE: 119 MMHG | DIASTOLIC BLOOD PRESSURE: 84 MMHG | TEMPERATURE: 95.7 F | BODY MASS INDEX: 22.2 KG/M2 | WEIGHT: 130 LBS | RESPIRATION RATE: 16 BRPM | HEART RATE: 94 BPM | OXYGEN SATURATION: 99 % | HEIGHT: 64 IN

## 2024-07-03 LAB
ALBUMIN SERPL BCP-MCNC: 3.5 G/DL (ref 3.4–5)
ALP SERPL-CCNC: 37 U/L (ref 33–136)
ALT SERPL W P-5'-P-CCNC: 23 U/L (ref 10–52)
ANION GAP SERPL CALC-SCNC: 20 MMOL/L (ref 10–20)
AST SERPL W P-5'-P-CCNC: 27 U/L (ref 9–39)
ATRIAL RATE: 72 BPM
ATRIAL RATE: 79 BPM
BILIRUB SERPL-MCNC: 0.6 MG/DL (ref 0–1.2)
BUN SERPL-MCNC: 7 MG/DL (ref 6–23)
CALCIUM SERPL-MCNC: 8 MG/DL (ref 8.6–10.6)
CHLORIDE SERPL-SCNC: 100 MMOL/L (ref 98–107)
CO2 SERPL-SCNC: 22 MMOL/L (ref 21–32)
CREAT SERPL-MCNC: 0.55 MG/DL (ref 0.5–1.3)
EGFRCR SERPLBLD CKD-EPI 2021: >90 ML/MIN/1.73M*2
GLUCOSE SERPL-MCNC: 86 MG/DL (ref 74–99)
HCV RNA SERPL NAA+PROBE-ACNC: NOT DETECTED K[IU]/ML
HCV RNA SERPL NAA+PROBE-LOG IU: NORMAL {LOG_IU}/ML
HOLD SPECIMEN: NORMAL
LABORATORY COMMENT REPORT: NORMAL
P AXIS: 64 DEGREES
P AXIS: 66 DEGREES
P OFFSET: 191 MS
P OFFSET: 192 MS
P ONSET: 145 MS
P ONSET: 146 MS
POTASSIUM SERPL-SCNC: 4.1 MMOL/L (ref 3.5–5.3)
PR INTERVAL: 150 MS
PR INTERVAL: 158 MS
PROT SERPL-MCNC: 5.6 G/DL (ref 6.4–8.2)
Q ONSET: 221 MS
Q ONSET: 224 MS
QRS COUNT: 12 BEATS
QRS COUNT: 13 BEATS
QRS DURATION: 86 MS
QRS DURATION: 98 MS
QT INTERVAL: 414 MS
QT INTERVAL: 476 MS
QTC CALCULATION(BAZETT): 453 MS
QTC CALCULATION(BAZETT): 545 MS
QTC FREDERICIA: 440 MS
QTC FREDERICIA: 522 MS
R AXIS: -12 DEGREES
R AXIS: -30 DEGREES
SODIUM SERPL-SCNC: 138 MMOL/L (ref 136–145)
T AXIS: 31 DEGREES
T AXIS: 59 DEGREES
T OFFSET: 431 MS
T OFFSET: 459 MS
VENTRICULAR RATE: 72 BPM
VENTRICULAR RATE: 79 BPM

## 2024-07-03 PROCEDURE — G0378 HOSPITAL OBSERVATION PER HR: HCPCS

## 2024-07-03 PROCEDURE — 36415 COLL VENOUS BLD VENIPUNCTURE: CPT

## 2024-07-03 PROCEDURE — 84075 ASSAY ALKALINE PHOSPHATASE: CPT

## 2024-07-03 PROCEDURE — 2500000001 HC RX 250 WO HCPCS SELF ADMINISTERED DRUGS (ALT 637 FOR MEDICARE OP): Mod: SE

## 2024-07-03 PROCEDURE — 2500000005 HC RX 250 GENERAL PHARMACY W/O HCPCS: Mod: SE

## 2024-07-03 PROCEDURE — 93005 ELECTROCARDIOGRAM TRACING: CPT

## 2024-07-03 RX ORDER — ACETAMINOPHEN 500 MG
1000 TABLET ORAL EVERY 6 HOURS PRN
Qty: 30 TABLET | Refills: 0 | Status: ON HOLD | OUTPATIENT
Start: 2024-07-03 | End: 2024-07-13

## 2024-07-03 RX ORDER — TRAMADOL HYDROCHLORIDE 50 MG/1
50 TABLET ORAL ONCE
Status: COMPLETED | OUTPATIENT
Start: 2024-07-03 | End: 2024-07-03

## 2024-07-03 RX ORDER — TRAMADOL HYDROCHLORIDE 50 MG/1
50 TABLET ORAL EVERY 4 HOURS PRN
Qty: 10 TABLET | Refills: 0 | Status: ON HOLD | OUTPATIENT
Start: 2024-07-03 | End: 2024-07-06

## 2024-07-03 RX ORDER — ONDANSETRON 4 MG/1
4 TABLET, FILM COATED ORAL EVERY 6 HOURS
Qty: 12 TABLET | Refills: 0 | Status: ON HOLD | OUTPATIENT
Start: 2024-07-03 | End: 2024-07-06

## 2024-07-03 RX ADMIN — FAMOTIDINE 20 MG: 20 TABLET, FILM COATED ORAL at 08:49

## 2024-07-03 RX ADMIN — TRAMADOL HYDROCHLORIDE 50 MG: 50 TABLET ORAL at 13:26

## 2024-07-03 RX ADMIN — BENZOCAINE AND MENTHOL 1 LOZENGE: 15; 3.6 LOZENGE ORAL at 13:26

## 2024-07-03 RX ADMIN — ACETAMINOPHEN 650 MG: 325 TABLET ORAL at 08:49

## 2024-07-03 RX ADMIN — ONDANSETRON 4 MG: 4 TABLET, ORALLY DISINTEGRATING ORAL at 09:20

## 2024-07-03 ASSESSMENT — PAIN SCALES - GENERAL: PAINLEVEL_OUTOF10: 10 - WORST POSSIBLE PAIN

## 2024-07-03 NOTE — DISCHARGE INSTRUCTIONS
Your potassium was found to be low which is why you stayed overnight here in the ED.  Otherwise, you do have a large left-sided inguinal hernia.  An internal referral was placed to our general surgery team.  A  should be reaching out to you in the coming days to facilitate this outpatient appointment.  If you do not hear from them, their phone number is [phone].  In addition, the CT scan of your abdomen and pelvis did note some inflammation of your esophagus.  They recommended having the scope by GI provider.  I also placed an internal referral to our GI team and they should also be reaching out to you in the coming days to facilitate that outpatient follow-up.  If you do not hear from them, their phone number is [phone].  Otherwise, I sent in some medications for your symptoms to your [facility] pharmacy.  Tylenol can be taken every 6 hours for pain.  There are only 10 Tramadol pills which you can take every 4 hours *only for severe/breakthrough pain.*  Do not take these if you do not need them.  Zofran can be taken every 6 hours for nausea/vomiting.  Magic mouthwash is used for your oral sores and you can swish and spit every 4-6 hours.  The lozenges are also used for the mouth sores and you can take 1 every 2 hours as needed.

## 2024-07-03 NOTE — H&P
History and Physical  [facility] CLINICAL DECISION UNIT    MRN: [id number]  Date of Placement in CDU: 7/2/2024  Time Placed in Observation: 10:43 PM  ED Provider: Peyton Ng PA-C      Limitations to history: none  Independent Historians: patient   External Records Reviewed: EMR, outside records, Care-everywhere     Patient History   Joe Alaniz is a 58 y.o. male with PMH of HTN, anxiety, BPH, nephrolithiasis and left inguinal hernia whom initially presented to the ED for concerns over painful mouth sores that first developed 4 days ago. However, on further discussion patient also noted that he had been sick with abdominal discomfort, intractable nausea and 3-4 episodes of NB/NB emesis over the past few days which has since resolved. No history of immunocompromise, STD or associated genital sores. No prior CA history, EtOH or drug use. Complete laboratory work-up including STI screening was largely unremarkable, no anemia. Syphillis, HIV and Hepatitis C were all negative. However, patient was incidentally found to be hypokalemic to 2.8 with associated QTc prolongation and U-wave formation noted on his EKG. CT imaging of neck was obtained due to concern for H&N cancer, which was negative for masses or acute abnormalities. CT abdomen/pelvis demonstrated several loops of bowel contained within a large left inguinal hernia without evidence of strangulation or incarceration. Left inguinal hernia was manually reduced with IV morphine for pain control and patient was informed about incidental findings concerning for esphogatitis, gastritis and/or enteritis. He was instructed to schedule OP follow-up with GI for EGD and also requested referral to surgeon for management of his inguinal hernia.   Although patient otherwise asymptomatic and potassium is being repleted, it was elected that patient be placed in CDU for overnight observation on telemetry and repeat EKG and potassium level in AM to ensure  resolution of EKG changes and successful repletion.         Upon admission to the Clinical Decision Unit, patient is hemodynamically stable and resting comfortably on exam bed. Denies any chest pain, pressure, palpitations. Will continue IV potassium repletion and resume home medications. I also placed outpatient referral to general surgeon for definitive management of large inguinal hernia.     The acute ED evaluation included:  Orders Placed This Encounter   Procedures    Group A Streptococcus, PCR    CT soft tissue neck w IV contrast    CT abdomen pelvis w IV contrast    CBC and Auto Differential    Comprehensive metabolic panel    Lipase    Urinalysis with Reflex Culture and Microscopic    HIV 1/2 Antigen/Antibody Screen with Reflex to Confirmation    Syphilis Screen with Reflex    HCV PCR with Genotype Reflex    Urinalysis with Reflex Culture and Microscopic    Extra Urine Gray Tube    Sars-CoV-2 PCR    Influenza A, and B PCR    RSV PCR    Magnesium    Comprehensive metabolic panel    Referral to General Surgery    Adult diet Regular    Weight on admission    Height on admission    Activity (specify) Ambulate    Vital Signs    Telemetry monitoring - Floor only    Notify provider    Pain Assessment    Place sequential compression device    Full code    ECG 12 lead    Electrocardiogram, 12-lead PRN ACS symptoms    Initiate observation status         Past Medical History: reviewed, see EMR and HPI  Past Surgical History: reviewed, see EMR  Social History: No tobacco use. Denies EtOH and illict substance use.  Family History: Non-contributory  Allergies: reviewed      Medications: reviewed  Previous Medications    AMLODIPINE (NORVASC) 10 MG TABLET    Take 1 tablet (10 mg) by mouth once daily.    ASPIRIN 81 MG CHEWABLE TABLET    Chew 1 tablet (81 mg) once daily.    HYDROCHLOROTHIAZIDE (HYDRODIURIL) 25 MG TABLET    Take 0.5 tablets (12.5 mg) by mouth once daily.    MELOXICAM (MOBIC) 15 MG TABLET    Take 1 tablet (15  "mg) by mouth once daily. WITH FOOD.    OXYBUTYNIN (DITROPAN) 5 MG TABLET    Take 1 tablet (5 mg) by mouth 3 times a day.    OXYCODONE-ACETAMINOPHEN (PERCOCET) 5-325 MG TABLET    Take 1 tablet by mouth every 6 hours if needed (FOR PAIN).    TAMSULOSIN (FLOMAX) 0.4 MG 24 HR CAPSULE    Take 1 capsule (0.4 mg) by mouth once daily.       Scheduled medications  famotidine, 20 mg, oral, BID   Or  famotidine, 20 mg, intravenous, BID  [START ON 7/3/2024] psyllium, 1 packet, oral, Daily      Continuous medications     PRN medications  PRN medications: acetaminophen **OR** acetaminophen **OR** acetaminophen, ondansetron ODT **OR** ondansetron       Review of Systems   All systems reviewed and otherwise negative, except as stated above in HPI.      Physical Examination     Visit Vitals  /85   Pulse 84   Temp 36.3 °C (97.3 °F) (Temporal)   Resp 16   Ht 1.626 m (5' 4\")   Wt 59 kg (130 lb)   SpO2 98%   BMI 22.31 kg/m²   BSA 1.63 m²       VS reviewed and noted NAD. Afebrile.   General: Patient is awake, conversant, well-nourished and overall well-appearing.    Head: NC/AT. No apparent traumatic injuries.   Eyes: EOMI, sclerae anicteric    ENT: Hearing grossly intact. TMs clear. MMM with small lesion on mucosa consistent with stomatitis. Posterior oropharynx unremarkable. Normal phonation without stridor, drooling or trismus.   Neck: Supple, full ROM without meningismus. No cervical lymphadenopathy.   Cardiac: Regular rate & rhythm. No murmur, gallops or rubs.    Lungs: CTAB with normal respiratory effort and good aeration throughout.    Abdomen: Soft, grossly non-tender, nonsurgical. Bowel sounds are present and normoactive x 4. No peritoneal signs.   MSK: Full ROM intact. Symmetric muscle bulk without step-offs or gross deformity.   Vascular: Pulses full and equal bilaterally. No cyanosis, clubbing or pitting LE edema. Capillary refill < 2s   Skin: Warm and intact without rashes, lesions or discoloration. Turgor is good.   " Neuro: CN II-XII grossly intact. A&Ox3. Speech is clear and fluent. No focal deficits identified.   Psychiatric: Mood and affect are appropriate for situation.      Diagnostic Evaluation   I reviewed the below labs and imaging as ordered by the ED provider:  CT soft tissue neck w IV contrast   Final Result   CT of the soft tissues of the neck shows no evidence for mass,   lymphadenopathy, or acute pathology.        MACRO:   None.        Signed by: Saroj Castillo 7/2/2024 7:01 PM   Dictation workstation:   [id number]      CT abdomen pelvis w IV contrast   Final Result   1. Gastric and small-bowel and large bowel mucosal hyperemia in   keeping with gastritis, enteritis and colitis, respectively. No bowel   dilation or definite abnormal wall thickening. Colonic diverticulosis   without evidence of acute diverticulitis. Appendix is not identified   with certainty but no pericecal inflammatory changes are seen.   2. Otherwise, no evidence of acute pathology.   3. Several loops of bowel are contained within a large left inguinal   hernia, similar to prior, without imaging evidence to suggest   incarceration or strangulation.   4. Circumferential mural thickening of the distal esophagus with   submucosal edema compatible with esophagitis; underlying neoplasm is   not excluded. Recommend endoscopic correlation or further evaluation   nonemergent outpatient fluoroscopic barium swallow exam.        I personally reviewed the images/study and I agree with the findings   as stated by Atul Bucio MD (Radiology Resident).   This study was interpreted at [facility], [place], [place].        MACRO:   None.        Signed by: Garfield Gregory 7/2/2024 8:15 PM   Dictation workstation:   [id number]          Labs Reviewed   CBC WITH AUTO DIFFERENTIAL - Abnormal       Result Value    WBC 8.1      nRBC 0.0      RBC 3.97 (*)     Hemoglobin 13.2 (*)     Hematocrit 36.4 (*)     MCV 92       MCH 33.2      MCHC 36.3 (*)     RDW 12.4      Platelets 427      Neutrophils % 73.6      Immature Granulocytes %, Automated 0.2      Lymphocytes % 17.1      Monocytes % 8.5      Eosinophils % 0.1      Basophils % 0.5      Neutrophils Absolute 5.99      Immature Granulocytes Absolute, Automated 0.02      Lymphocytes Absolute 1.39      Monocytes Absolute 0.69      Eosinophils Absolute 0.01      Basophils Absolute 0.04     COMPREHENSIVE METABOLIC PANEL - Abnormal    Glucose 122 (*)     Sodium 139      Potassium 2.8 (*)     Chloride 91 (*)     Bicarbonate 27      Anion Gap 24 (*)     Urea Nitrogen 8      Creatinine 0.70      eGFR >90      Calcium 9.7      Albumin 4.4      Alkaline Phosphatase 54      Total Protein 7.7      AST 40 (*)     Bilirubin, Total 1.0      ALT 34     URINALYSIS WITH REFLEX CULTURE AND MICROSCOPIC - Abnormal    Color, Urine Yellow      Appearance, Urine Clear      Specific Gravity, Urine 1.026      pH, Urine 6.5      Protein, Urine 70 (1+) (*)     Glucose, Urine Normal      Blood, Urine NEGATIVE      Ketones, Urine 150 (4+) (*)     Bilirubin, Urine 1 (1+) (*)     Urobilinogen, Urine 6 (2+) (*)     Nitrite, Urine NEGATIVE      Leukocyte Esterase, Urine NEGATIVE     URINALYSIS MICROSCOPIC WITH REFLEX CULTURE - Abnormal    WBC, Urine 1-5      RBC, Urine 1-2      Mucus, Urine 2+      Hyaline Casts, Urine 1+ (*)    GROUP A STREPTOCOCCUS, PCR - Normal    Group A Strep PCR Not Detected     LIPASE - Normal    Lipase 14      Narrative:     Venipuncture immediately after or during the administration of Metamizole may lead to falsely low results. Testing should be performed immediately prior to Metamizole dosing.   HIV 1/2 ANTIGEN/ANTIBODY SCREEN WI REFLEX TO CONFIRMATION - Normal    HIV 1/2 Antigen/Antibody Screen with Reflex to Confirmation Nonreactive      Narrative:     HIV Ag/Ab screen is performed using the Siemens [employer] HIV Ag/Ab Combo assay which detects the presence of HIV p24 antigen as well  as antibodies to HIV-1 (Group M and O) and HIV-2.                  No laboratory evidence of HIV infection. If acute HIV infection is suspected, consider testing for HIV RNA by PCR (viral load).   SYPHILIS SCREENING WITH REFLEX - Normal    Syphilis Total Ab Nonreactive     SARS-COV-2 PCR - Normal    Coronavirus 2019, PCR Not Detected      Narrative:     This assay has received FDA Emergency Use Authorization (EUA) and is only authorized for the duration of time that circumstances exist to justify the authorization of the emergency use of in vitro diagnostic tests for the detection of SARS-CoV-2 virus and/or diagnosis of COVID-19 infection under section 564(b)(1) of the Act, 21 U.S.C. 360bbb-3(b)(1). This assay is an in vitro diagnostic nucleic acid amplification test for the qualitative detection of SARS-CoV-2 from nasopharyngeal specimens and has been validated for use at [facility]. Negative results do not preclude COVID-19 infections and should not be used as the sole basis for diagnosis, treatment, or other management decisions.                     INFLUENZA A AND B PCR - Normal    Flu A Result Not Detected      Flu B Result Not Detected      Narrative:     This assay is an in vitro diagnostic multiplex nucleic acid amplification test for the detection and discrimination of Influenza A & B from nasopharyngeal specimens, and has been validated for use at [facility]. Negative results do not preclude Influenza A/B infections, and should not be used as the sole basis for diagnosis, treatment, or other management decisions. If Influenza A/B and RSV PCR results are negative, testing for Parainfluenza virus, Adenovirus and Metapneumovirus is routinely performed for [facility] pediatric oncology and intensive care inpatients, and is available on other patients by placing an add-on request.   RSV PCR - Normal    RSV PCR Not Detected      Narrative:     This assay is an  FDA-cleared, in vitro diagnostic nucleic acid amplification test for the detection of RSV from nasopharyngeal specimens, and has been validated for use at [place]. Negative results do not preclude RSV infections, and should not be used as the sole basis for diagnosis, treatment, or other management decisions. If Influenza A/B and RSV PCR results are negative, testing for Parainfluenza virus, Adenovirus and Metapneumovirus is routinely performed for pediatric oncology and intensive care inpatients at [facility], and is available on other patients by placing an add-on request.                                       MAGNESIUM - Normal    Magnesium 1.75     URINALYSIS WITH REFLEX CULTURE AND MICROSCOPIC    Narrative:     The following orders were created for panel order Urinalysis with Reflex Culture and Microscopic.                  Procedure                               Abnormality         Status                                     ---------                               -----------         ------                                     Urinalysis with Reflex C...[[id number]]  Abnormal            Final result                               Extra Urine Gray Tube[[id number]]                            In process                                                   Please view results for these tests on the individual orders.   HCV PCR WITH GENOTYPE REFLEX   EXTRA URINE GRAY TUBE   COMPREHENSIVE METABOLIC PANEL       Diagnostic studies and ED interventions germane to this period of clinical observation will include:   Cardiac telemetry, IV fluids, Potassium replacement, repeat EKG and labs in AM, General surgery referral for OUTPATIENT follow-up        Consultants (if applicable)  1) N/A      Assessment and Plan   In summary, Joe Alaniz is admitted to the [facility] Clinical Decision Unit for asymptomatic hypokalemia. Dr. Metcalf is the CDU admission attending.    This patient has  been risk-stratified based on available history, physical exam, and related study findings. Admission to the observation status for further diagnosis/treatment/monitoring of patient's condition is warranted clinically. This extended period of observation is specifically required to determine the need for hospitalization.     The goals of this admission based on the patient’s clinical problem list are:  1) Hypokalemia     -- Replete with 20mqEq PO Klor-Con + 20mEq IV     -- Cardiac telemetry     -- Repeat EKG and labs in AM to ensure resolution     -- Monitor VS for HDS     -- Regular diet    2) Left Inguinal Hernia     -- Manually reduced in ED under morphine     -- Abdomen non-tender.     -- Refer to general surgeon OP for definitive/surgical management      We will observe the patient for the following endpoints:   VSS  No new or worsening EKG changes  Resolution of electrolyte abnormalities  Schedule outpatient follow-up with general surgery for hernia management    When met, appropriate disposition will be arranged.      Peyton Ng PA-C  [facility]  Emergency Dept.

## 2024-07-03 NOTE — PROGRESS NOTES
Patient was handed off to me from the previous team. For full history, physical, and prior ED course, please see previous provider note prior to patient handoff. This is an addendum to the record.    Joe Alaniz   presented to Emergency Department  for   Chief Complaint   Patient presents with    Mouth Lesions     Medical work up included labs, diagnostic testing.   Labs:  Labs Reviewed   CBC WITH AUTO DIFFERENTIAL - Abnormal       Result Value    WBC 8.1      nRBC 0.0      RBC 3.97 (*)     Hemoglobin 13.2 (*)     Hematocrit 36.4 (*)     MCV 92      MCH 33.2      MCHC 36.3 (*)     RDW 12.4      Platelets 427      Neutrophils % 73.6      Immature Granulocytes %, Automated 0.2      Lymphocytes % 17.1      Monocytes % 8.5      Eosinophils % 0.1      Basophils % 0.5      Neutrophils Absolute 5.99      Immature Granulocytes Absolute, Automated 0.02      Lymphocytes Absolute 1.39      Monocytes Absolute 0.69      Eosinophils Absolute 0.01      Basophils Absolute 0.04     COMPREHENSIVE METABOLIC PANEL - Abnormal    Glucose 122 (*)     Sodium 139      Potassium 2.8 (*)     Chloride 91 (*)     Bicarbonate 27      Anion Gap 24 (*)     Urea Nitrogen 8      Creatinine 0.70      eGFR >90      Calcium 9.7      Albumin 4.4      Alkaline Phosphatase 54      Total Protein 7.7      AST 40 (*)     Bilirubin, Total 1.0      ALT 34     URINALYSIS WITH REFLEX CULTURE AND MICROSCOPIC - Abnormal    Color, Urine Yellow      Appearance, Urine Clear      Specific Gravity, Urine 1.026      pH, Urine 6.5      Protein, Urine 70 (1+) (*)     Glucose, Urine Normal      Blood, Urine NEGATIVE      Ketones, Urine 150 (4+) (*)     Bilirubin, Urine 1 (1+) (*)     Urobilinogen, Urine 6 (2+) (*)     Nitrite, Urine NEGATIVE      Leukocyte Esterase, Urine NEGATIVE     URINALYSIS MICROSCOPIC WITH REFLEX CULTURE - Abnormal    WBC, Urine 1-5      RBC, Urine 1-2      Mucus, Urine 2+      Hyaline Casts, Urine 1+ (*)    GROUP A STREPTOCOCCUS, PCR - Normal     Group A Strep PCR Not Detected     LIPASE - Normal    Lipase 14      Narrative:     Venipuncture immediately after or during the administration of Metamizole may lead to falsely low results. Testing should be performed immediately prior to Metamizole dosing.   HIV 1/2 ANTIGEN/ANTIBODY SCREEN WIH REFLEX TO CONFIRMATION - Normal    HIV 1/2 Antigen/Antibody Screen with Reflex to Confirmation Nonreactive      Narrative:     HIV Ag/Ab screen is performed using the Siemens [employer] HIV Ag/Ab Combo assay which detects the presence of HIV p24 antigen as well as antibodies to HIV-1 (Group M and O) and HIV-2.  No laboratory evidence of HIV infection. If acute HIV infection is suspected, consider testing for HIV RNA by PCR (viral load).   SYPHILIS SCREENING WITH REFLEX - Normal    Syphilis Total Ab Nonreactive     SARS-COV-2 PCR - Normal    Coronavirus 2019, PCR Not Detected      Narrative:     This assay has received FDA Emergency Use Authorization (EUA) and is only authorized for the duration of time that circumstances exist to justify the authorization of the emergency use of in vitro diagnostic tests for the detection of SARS-CoV-2 virus and/or diagnosis of COVID-19 infection under section 564(b)(1) of the Act, 21 U.S.C. 360bbb-3(b)(1). This assay is an in vitro diagnostic nucleic acid amplification test for the qualitative detection of SARS-CoV-2 from nasopharyngeal specimens and has been validated for use at [facility]. Negative results do not preclude COVID-19 infections and should not be used as the sole basis for diagnosis, treatment, or other management decisions.     INFLUENZA A AND B PCR - Normal    Flu A Result Not Detected      Flu B Result Not Detected      Narrative:     This assay is an in vitro diagnostic multiplex nucleic acid amplification test for the detection and discrimination of Influenza A & B from nasopharyngeal specimens, and has been validated for use at [facility]  [facility]. Negative results do not preclude Influenza A/B infections, and should not be used as the sole basis for diagnosis, treatment, or other management decisions. If Influenza A/B and RSV PCR results are negative, testing for Parainfluenza virus, Adenovirus and Metapneumovirus is routinely performed for [facility] [place] and intensive care inpatients, and is available on other patients by placing an add-on request.   RSV PCR - Normal    RSV PCR Not Detected      Narrative:     This assay is an FDA-cleared, in vitro diagnostic nucleic acid amplification test for the detection of RSV from nasopharyngeal specimens, and has been validated for use at [facility]. Negative results do not preclude RSV infections, and should not be used as the sole basis for diagnosis, treatment, or other management decisions. If Influenza A/B and RSV PCR results are negative, testing for Parainfluenza virus, Adenovirus and Metapneumovirus is routinely performed for pediatric oncology and intensive care inpatients at [facility], and is available on other patients by placing an add-on request.       MAGNESIUM - Normal    Magnesium 1.75     URINALYSIS WITH REFLEX CULTURE AND MICROSCOPIC    Narrative:     The following orders were created for panel order Urinalysis with Reflex Culture and Microscopic.  Procedure                               Abnormality         Status                     ---------                               -----------         ------                     Urinalysis with Reflex C...[[id number]]  Abnormal            Final result               Extra Urine Gray Tube[[id number]]                            In process                   Please view results for these tests on the individual orders.   HCV PCR WITH GENOTYPE REFLEX   EXTRA URINE GRAY TUBE   COMPREHENSIVE METABOLIC PANEL      Imaging:  CT soft tissue neck w IV contrast   Final Result   CT of the soft tissues of the neck shows no evidence for  mass,   lymphadenopathy, or acute pathology.        MACRO:   None.        Signed by: Saroj Castillo 7/2/2024 7:01 PM   Dictation workstation:   [id number]      CT abdomen pelvis w IV contrast   Final Result   1. Gastric and small-bowel and large bowel mucosal hyperemia in   keeping with gastritis, enteritis and colitis, respectively. No bowel   dilation or definite abnormal wall thickening. Colonic diverticulosis   without evidence of acute diverticulitis. Appendix is not identified   with certainty but no pericecal inflammatory changes are seen.   2. Otherwise, no evidence of acute pathology.   3. Several loops of bowel are contained within a large left inguinal   hernia, similar to prior, without imaging evidence to suggest   incarceration or strangulation.   4. Circumferential mural thickening of the distal esophagus with   submucosal edema compatible with esophagitis; underlying neoplasm is   not excluded. Recommend endoscopic correlation or further evaluation   nonemergent outpatient fluoroscopic barium swallow exam.        I personally reviewed the images/study and I agree with the findings   as stated by Atul Bucio MD (Radiology Resident).   This study was interpreted at [place], [place].        MACRO:   None.        Signed by: Garfield Gregory 7/2/2024 8:15 PM   Dictation workstation:   [id number]          At time of sign out pending: Final CT abdomen pelvis results       What occured after transition of care: CT abdomen pelvis results showed a left-sided inguinal hernia without evidence of incarceration or strangulation. EKG showed new onset QT prolongation and U wave. Hernia was easily manually reduced at the bedside.  Prior provider administered 20 IV and 40 PO mEq of potassium.  Patient was admitted to CDU with a plan for repeat lab work and EKG in the AM with overnight telemetry monitoring.  If patient remains stable overnight, repeat  lab work is WNL and potassium repletion resolves his EKG changes plan for discharge with outpatient referral to surgery for inguinal hernia.       Case reviewed with Dr. Dixon Nair PA-C

## 2024-07-03 NOTE — DISCHARGE SUMMARY
Disposition Note  Clinical Decision Unit   Patient: Joe Alaniz  MRN: [id number]  : 1965  Encounter Date: 2024  CDU Provider: Seda Lira PA-C      Limitations to history: None  Independent Historian: Patient  External Records Reviewed: Patient's chart      Subjective:    Joe Alaniz is a 58 y.o. male has undergone comprehensive diagnostic evaluation and therapeutic management in accordance with the CDU guidelines for hypokalemia. Based on Mr. Alaniz's clinical response and diagnostic information during this period of observation, it has been determined that the patient will be discharged.     The acute evaluation included:  Orders Placed This Encounter   Procedures    Group A Streptococcus, PCR    CT soft tissue neck w IV contrast    CT abdomen pelvis w IV contrast    CBC and Auto Differential    Comprehensive metabolic panel    Lipase    Urinalysis with Reflex Culture and Microscopic    HIV 1/2 Antigen/Antibody Screen with Reflex to Confirmation    Syphilis Screen with Reflex    HCV PCR with Genotype Reflex    Urinalysis with Reflex Culture and Microscopic    Extra Urine Gray Tube    Sars-CoV-2 PCR    Influenza A, and B PCR    RSV PCR    Magnesium    Comprehensive metabolic panel    Referral to General Surgery    Adult diet Regular    Weight on admission    Height on admission    Activity (specify) Ambulate    Vital Signs    Telemetry monitoring - Floor only    Notify provider    Pain Assessment    Place sequential compression device    Full code    ECG 12 lead    Electrocardiogram, 12-lead PRN ACS symptoms    ECG 12 lead    Initiate observation status       Results for orders placed or performed during the hospital encounter of 24   Group A Streptococcus, PCR    Specimen: Throat/Pharynx; Swab   Result Value Ref Range    Group A Strep PCR Not Detected Not Detected   CBC and Auto Differential   Result Value Ref Range    WBC 8.1 4.4 - 11.3 x10*3/uL    nRBC 0.0 0.0 - 0.0 /100 WBCs    RBC 3.97  (L) 4.50 - 5.90 x10*6/uL    Hemoglobin 13.2 (L) 13.5 - 17.5 g/dL    Hematocrit 36.4 (L) 41.0 - 52.0 %    MCV 92 80 - 100 fL    MCH 33.2 26.0 - 34.0 pg    MCHC 36.3 (H) 32.0 - 36.0 g/dL    RDW 12.4 11.5 - 14.5 %    Platelets 427 150 - 450 x10*3/uL    Neutrophils % 73.6 40.0 - 80.0 %    Immature Granulocytes %, Automated 0.2 0.0 - 0.9 %    Lymphocytes % 17.1 13.0 - 44.0 %    Monocytes % 8.5 2.0 - 10.0 %    Eosinophils % 0.1 0.0 - 6.0 %    Basophils % 0.5 0.0 - 2.0 %    Neutrophils Absolute 5.99 1.20 - 7.70 x10*3/uL    Immature Granulocytes Absolute, Automated 0.02 0.00 - 0.70 x10*3/uL    Lymphocytes Absolute 1.39 1.20 - 4.80 x10*3/uL    Monocytes Absolute 0.69 0.10 - 1.00 x10*3/uL    Eosinophils Absolute 0.01 0.00 - 0.70 x10*3/uL    Basophils Absolute 0.04 0.00 - 0.10 x10*3/uL   Comprehensive metabolic panel   Result Value Ref Range    Glucose 122 (H) 74 - 99 mg/dL    Sodium 139 136 - 145 mmol/L    Potassium 2.8 (LL) 3.5 - 5.3 mmol/L    Chloride 91 (L) 98 - 107 mmol/L    Bicarbonate 27 21 - 32 mmol/L    Anion Gap 24 (H) 10 - 20 mmol/L    Urea Nitrogen 8 6 - 23 mg/dL    Creatinine 0.70 0.50 - 1.30 mg/dL    eGFR >90 >60 mL/min/1.73m*2    Calcium 9.7 8.6 - 10.6 mg/dL    Albumin 4.4 3.4 - 5.0 g/dL    Alkaline Phosphatase 54 33 - 136 U/L    Total Protein 7.7 6.4 - 8.2 g/dL    AST 40 (H) 9 - 39 U/L    Bilirubin, Total 1.0 0.0 - 1.2 mg/dL    ALT 34 10 - 52 U/L   Lipase   Result Value Ref Range    Lipase 14 9 - 82 U/L   HIV 1/2 Antigen/Antibody Screen with Reflex to Confirmation   Result Value Ref Range    HIV 1/2 Antigen/Antibody Screen with Reflex to Confirmation Nonreactive Nonreactive   Syphilis Screen with Reflex   Result Value Ref Range    Syphilis Total Ab Nonreactive Nonreactive   Urinalysis with Reflex Culture and Microscopic   Result Value Ref Range    Color, Urine Yellow Light-Yellow, Yellow, Dark-Yellow    Appearance, Urine Clear Clear    Specific Gravity, Urine 1.026 1.005 - 1.035    pH, Urine 6.5 5.0, 5.5, 6.0,  6.5, 7.0, 7.5, 8.0    Protein, Urine 70 (1+) (A) NEGATIVE, 10 (TRACE), 20 (TRACE) mg/dL    Glucose, Urine Normal Normal mg/dL    Blood, Urine NEGATIVE NEGATIVE    Ketones, Urine 150 (4+) (A) NEGATIVE mg/dL    Bilirubin, Urine 1 (1+) (A) NEGATIVE    Urobilinogen, Urine 6 (2+) (A) Normal mg/dL    Nitrite, Urine NEGATIVE NEGATIVE    Leukocyte Esterase, Urine NEGATIVE NEGATIVE   Extra Urine Gray Tube   Result Value Ref Range    Extra Tube Hold for add-ons.    Sars-CoV-2 PCR   Result Value Ref Range    Coronavirus 2019, PCR Not Detected Not Detected   Influenza A, and B PCR   Result Value Ref Range    Flu A Result Not Detected Not Detected    Flu B Result Not Detected Not Detected   RSV PCR   Result Value Ref Range    RSV PCR Not Detected Not Detected   Magnesium   Result Value Ref Range    Magnesium 1.75 1.60 - 2.40 mg/dL   Comprehensive metabolic panel   Result Value Ref Range    Glucose 86 74 - 99 mg/dL    Sodium 138 136 - 145 mmol/L    Potassium 4.1 3.5 - 5.3 mmol/L    Chloride 100 98 - 107 mmol/L    Bicarbonate 22 21 - 32 mmol/L    Anion Gap 20 10 - 20 mmol/L    Urea Nitrogen 7 6 - 23 mg/dL    Creatinine 0.55 0.50 - 1.30 mg/dL    eGFR >90 >60 mL/min/1.73m*2    Calcium 8.0 (L) 8.6 - 10.6 mg/dL    Albumin 3.5 3.4 - 5.0 g/dL    Alkaline Phosphatase 37 33 - 136 U/L    Total Protein 5.6 (L) 6.4 - 8.2 g/dL    AST 27 9 - 39 U/L    Bilirubin, Total 0.6 0.0 - 1.2 mg/dL    ALT 23 10 - 52 U/L   ECG 12 lead   Result Value Ref Range    Ventricular Rate 79 BPM    Atrial Rate 79 BPM    NE Interval 150 ms    QRS Duration 98 ms    QT Interval 476 ms    QTC Calculation(Bazett) 545 ms    P Axis 64 degrees    R Axis -30 degrees    T Axis 31 degrees    QRS Count 13 beats    Q Onset 221 ms    P Onset 146 ms    P Offset 191 ms    T Offset 459 ms    QTC Fredericia 522 ms   ECG 12 lead   Result Value Ref Range    Ventricular Rate 72 BPM    Atrial Rate 72 BPM    NE Interval 158 ms    QRS Duration 86 ms    QT Interval 414 ms    QTC  Calculation(Bazett) 453 ms    P Axis 66 degrees    R Axis -12 degrees    T Axis 59 degrees    QRS Count 12 beats    Q Onset 224 ms    P Onset 145 ms    P Offset 192 ms    T Offset 431 ms    QTC Fredericia 440 ms   Urinalysis Microscopic   Result Value Ref Range    WBC, Urine 1-5 1-5, NONE /HPF    RBC, Urine 1-2 NONE, 1-2, 3-5 /HPF    Mucus, Urine 2+ Reference range not established. /LPF    Hyaline Casts, Urine 1+ (A) NONE /LPF            Past History   No past medical history on file.  No past surgical history on file.  Social History     Socioeconomic History    Marital status: Single     Spouse name: Not on file    Number of children: Not on file    Years of education: Not on file    Highest education level: Not on file   Occupational History    Not on file   Tobacco Use    Smoking status: Not on file    Smokeless tobacco: Not on file   Substance and Sexual Activity    Alcohol use: Not on file    Drug use: Not on file    Sexual activity: Not on file   Other Topics Concern    Not on file   Social History Narrative    Not on file     Social Determinants of Health     Financial Resource Strain: Not on file   Food Insecurity: Not on file   Transportation Needs: Not on file   Physical Activity: Not on file   Stress: Not on file   Social Connections: Not on file   Intimate Partner Violence: Not on file   Housing Stability: Not on file         Medications/Allergies     Previous Medications    AMLODIPINE (NORVASC) 10 MG TABLET    Take 1 tablet (10 mg) by mouth once daily.    ASPIRIN 81 MG CHEWABLE TABLET    Chew 1 tablet (81 mg) once daily.    HYDROCHLOROTHIAZIDE (HYDRODIURIL) 25 MG TABLET    Take 0.5 tablets (12.5 mg) by mouth once daily.    MELOXICAM (MOBIC) 15 MG TABLET    Take 1 tablet (15 mg) by mouth once daily. WITH FOOD.    OXYBUTYNIN (DITROPAN) 5 MG TABLET    Take 1 tablet (5 mg) by mouth 3 times a day.    PANTOPRAZOLE (PROTONIX) 40 MG EC TABLET    Take 1 tablet (40 mg) by mouth early in the morning..     TAMSULOSIN (FLOMAX) 0.4 MG 24 HR CAPSULE    Take 1 capsule (0.4 mg) by mouth once daily.     Allergies   Allergen Reactions    Erythromycin Hives         Review of Systems  All systems reviewed and otherwise negative, except as stated above in HPI.    Diagnostics reviewed by Seda Lira PA-C     Labs:  Results for orders placed or performed during the hospital encounter of 07/02/24   Group A Streptococcus, PCR    Specimen: Throat/Pharynx; Swab   Result Value Ref Range    Group A Strep PCR Not Detected Not Detected   CBC and Auto Differential   Result Value Ref Range    WBC 8.1 4.4 - 11.3 x10*3/uL    nRBC 0.0 0.0 - 0.0 /100 WBCs    RBC 3.97 (L) 4.50 - 5.90 x10*6/uL    Hemoglobin 13.2 (L) 13.5 - 17.5 g/dL    Hematocrit 36.4 (L) 41.0 - 52.0 %    MCV 92 80 - 100 fL    MCH 33.2 26.0 - 34.0 pg    MCHC 36.3 (H) 32.0 - 36.0 g/dL    RDW 12.4 11.5 - 14.5 %    Platelets 427 150 - 450 x10*3/uL    Neutrophils % 73.6 40.0 - 80.0 %    Immature Granulocytes %, Automated 0.2 0.0 - 0.9 %    Lymphocytes % 17.1 13.0 - 44.0 %    Monocytes % 8.5 2.0 - 10.0 %    Eosinophils % 0.1 0.0 - 6.0 %    Basophils % 0.5 0.0 - 2.0 %    Neutrophils Absolute 5.99 1.20 - 7.70 x10*3/uL    Immature Granulocytes Absolute, Automated 0.02 0.00 - 0.70 x10*3/uL    Lymphocytes Absolute 1.39 1.20 - 4.80 x10*3/uL    Monocytes Absolute 0.69 0.10 - 1.00 x10*3/uL    Eosinophils Absolute 0.01 0.00 - 0.70 x10*3/uL    Basophils Absolute 0.04 0.00 - 0.10 x10*3/uL   Comprehensive metabolic panel   Result Value Ref Range    Glucose 122 (H) 74 - 99 mg/dL    Sodium 139 136 - 145 mmol/L    Potassium 2.8 (LL) 3.5 - 5.3 mmol/L    Chloride 91 (L) 98 - 107 mmol/L    Bicarbonate 27 21 - 32 mmol/L    Anion Gap 24 (H) 10 - 20 mmol/L    Urea Nitrogen 8 6 - 23 mg/dL    Creatinine 0.70 0.50 - 1.30 mg/dL    eGFR >90 >60 mL/min/1.73m*2    Calcium 9.7 8.6 - 10.6 mg/dL    Albumin 4.4 3.4 - 5.0 g/dL    Alkaline Phosphatase 54 33 - 136 U/L    Total Protein 7.7 6.4 - 8.2 g/dL    AST 40  (H) 9 - 39 U/L    Bilirubin, Total 1.0 0.0 - 1.2 mg/dL    ALT 34 10 - 52 U/L   Lipase   Result Value Ref Range    Lipase 14 9 - 82 U/L   HIV 1/2 Antigen/Antibody Screen with Reflex to Confirmation   Result Value Ref Range    HIV 1/2 Antigen/Antibody Screen with Reflex to Confirmation Nonreactive Nonreactive   Syphilis Screen with Reflex   Result Value Ref Range    Syphilis Total Ab Nonreactive Nonreactive   Urinalysis with Reflex Culture and Microscopic   Result Value Ref Range    Color, Urine Yellow Light-Yellow, Yellow, Dark-Yellow    Appearance, Urine Clear Clear    Specific Gravity, Urine 1.026 1.005 - 1.035    pH, Urine 6.5 5.0, 5.5, 6.0, 6.5, 7.0, 7.5, 8.0    Protein, Urine 70 (1+) (A) NEGATIVE, 10 (TRACE), 20 (TRACE) mg/dL    Glucose, Urine Normal Normal mg/dL    Blood, Urine NEGATIVE NEGATIVE    Ketones, Urine 150 (4+) (A) NEGATIVE mg/dL    Bilirubin, Urine 1 (1+) (A) NEGATIVE    Urobilinogen, Urine 6 (2+) (A) Normal mg/dL    Nitrite, Urine NEGATIVE NEGATIVE    Leukocyte Esterase, Urine NEGATIVE NEGATIVE   Extra Urine Gray Tube   Result Value Ref Range    Extra Tube Hold for add-ons.    Sars-CoV-2 PCR   Result Value Ref Range    Coronavirus 2019, PCR Not Detected Not Detected   Influenza A, and B PCR   Result Value Ref Range    Flu A Result Not Detected Not Detected    Flu B Result Not Detected Not Detected   RSV PCR   Result Value Ref Range    RSV PCR Not Detected Not Detected   Magnesium   Result Value Ref Range    Magnesium 1.75 1.60 - 2.40 mg/dL   Comprehensive metabolic panel   Result Value Ref Range    Glucose 86 74 - 99 mg/dL    Sodium 138 136 - 145 mmol/L    Potassium 4.1 3.5 - 5.3 mmol/L    Chloride 100 98 - 107 mmol/L    Bicarbonate 22 21 - 32 mmol/L    Anion Gap 20 10 - 20 mmol/L    Urea Nitrogen 7 6 - 23 mg/dL    Creatinine 0.55 0.50 - 1.30 mg/dL    eGFR >90 >60 mL/min/1.73m*2    Calcium 8.0 (L) 8.6 - 10.6 mg/dL    Albumin 3.5 3.4 - 5.0 g/dL    Alkaline Phosphatase 37 33 - 136 U/L    Total  Protein 5.6 (L) 6.4 - 8.2 g/dL    AST 27 9 - 39 U/L    Bilirubin, Total 0.6 0.0 - 1.2 mg/dL    ALT 23 10 - 52 U/L   ECG 12 lead   Result Value Ref Range    Ventricular Rate 79 BPM    Atrial Rate 79 BPM    OH Interval 150 ms    QRS Duration 98 ms    QT Interval 476 ms    QTC Calculation(Bazett) 545 ms    P Axis 64 degrees    R Axis -30 degrees    T Axis 31 degrees    QRS Count 13 beats    Q Onset 221 ms    P Onset 146 ms    P Offset 191 ms    T Offset 459 ms    QTC Fredericia 522 ms   ECG 12 lead   Result Value Ref Range    Ventricular Rate 72 BPM    Atrial Rate 72 BPM    OH Interval 158 ms    QRS Duration 86 ms    QT Interval 414 ms    QTC Calculation(Bazett) 453 ms    P Axis 66 degrees    R Axis -12 degrees    T Axis 59 degrees    QRS Count 12 beats    Q Onset 224 ms    P Onset 145 ms    P Offset 192 ms    T Offset 431 ms    QTC Fredericia 440 ms   Urinalysis Microscopic   Result Value Ref Range    WBC, Urine 1-5 1-5, NONE /HPF    RBC, Urine 1-2 NONE, 1-2, 3-5 /HPF    Mucus, Urine 2+ Reference range not established. /LPF    Hyaline Casts, Urine 1+ (A) NONE /LPF     Radiographs:  CT soft tissue neck w IV contrast   Final Result   CT of the soft tissues of the neck shows no evidence for mass,   lymphadenopathy, or acute pathology.        MACRO:   None.        Signed by: Saroj Castillo 7/2/2024 7:01 PM   Dictation workstation:   [id number]      CT abdomen pelvis w IV contrast   Final Result   1. Gastric and small-bowel and large bowel mucosal hyperemia in   keeping with gastritis, enteritis and colitis, respectively. No bowel   dilation or definite abnormal wall thickening. Colonic diverticulosis   without evidence of acute diverticulitis. Appendix is not identified   with certainty but no pericecal inflammatory changes are seen.   2. Otherwise, no evidence of acute pathology.   3. Several loops of bowel are contained within a large left inguinal   hernia, similar to prior, without imaging evidence to suggest  "  incarceration or strangulation.   4. Circumferential mural thickening of the distal esophagus with   submucosal edema compatible with esophagitis; underlying neoplasm is   not excluded. Recommend endoscopic correlation or further evaluation   nonemergent outpatient fluoroscopic barium swallow exam.        I personally reviewed the images/study and I agree with the findings   as stated by Atul Bucio MD (Radiology Resident).   This study was interpreted at [facility], [place], [place].        MACRO:   None.        Signed by: Garfield Gregory 7/2/2024 8:15 PM   Dictation workstation:   [id number]              Physical Exam     Visit Vitals  /84 (BP Location: Left arm, Patient Position: Lying)   Pulse 94   Temp 35.4 °C (95.7 °F) (Tympanic)   Resp 16   Ht 1.626 m (5' 4\")   Wt 59 kg (130 lb)   SpO2 99%   BMI 22.31 kg/m²   BSA 1.63 m²       GENERAL:  Resting comfortably in no acute distress. The patient appears nourished and normally developed. Vital signs as documented.     HEENT:  2 aphthous ulcers visualized on the lower lip. Oropharynx otherwise without erythema or edema. Tolerating secretions and able to speak clearly in full sentences.     PULMONARY:  Lungs are clear to auscultation without any respiratory distress. Able to speak full sentences. No accessory muscle use.    CARDIAC:  Normal rate. No murmurs, rubs or gallops.    ABDOMEN:  Protruding, large, left-sided inguinal hernia TTP. Otherwise soft, non-distended, non-tender. BS positive x 4 quadrants. No rebound, guarding, peritoneal signs, CVA tenderness, masses, or organomegaly.    MUSCULOSKELETAL:  Able to ambulate. Non edematous with no obvious deformities. Pulses intact distal.    SKIN:  Good color with no significant rashes. No pallor.    NEURO:  No obvious neurological deficits. Normal sensation and strength bilaterally. Able to follow commands.    Psych:  Appropriate mood and " affect.    Consultants  1) N/A      Impression and Plan    In summary, Joe Alaniz has been cared for according to the standard [facility] Clinical Decision Unit observation protocol for Hypokalemia. This extended period of observation was specifically required to determine the need for hospitalization. Prior to discharge from observation, the final physical exam is documented above. I have reviewed the results of the labs and imaging that were performed in the ED as well as the CDU.      Significant events during the course of observation based on the goals of the clinical problem list include:   1) No new or worsening EKG changes  2) Resolution of hypokalemia  3) Stable VS    Based on the patient's condition and test results, the patient will be discharged.    Date and Time of Disposition.   Discharge: 7/3/2024, 1230    Dr. Pratt is the CDU disposition attending.      Discharge Diagnosis  Hypokalemia    Issues Requiring Follow-Up  - Left inguinal hernia  - Esophagitis     Discharge Meds     Your medication list        ASK your doctor about these medications        Instructions Last Dose Given Next Dose Due   amLODIPine 10 mg tablet  Commonly known as: Norvasc      Take 1 tablet (10 mg) by mouth once daily.       aspirin 81 mg chewable tablet      Chew 1 tablet (81 mg) once daily.       hydroCHLOROthiazide 25 mg tablet  Commonly known as: HYDRODiuril      Take 0.5 tablets (12.5 mg) by mouth once daily.       meloxicam 15 mg tablet  Commonly known as: Mobic           oxybutynin 5 mg tablet  Commonly known as: Ditropan           pantoprazole 40 mg EC tablet  Commonly known as: ProtoNix           tamsulosin 0.4 mg 24 hr capsule  Commonly known as: Flomax                    Test Results Pending At Discharge  Pending Labs       Order Current Status    HCV PCR with Genotype Reflex In process            Hospital Course   Patient is a 58-year-old male with a past medical history significant  for HTN who presented to the ED with mouth sores.  He stated it was painful to swallow and has been unable to tolerate oral intake for the last few days.  Also endorsed episodes of nausea/vomiting.  Denied any fever/chills, flulike symptoms, history of malignancy, or drug use.  Physical exam revealed stomatitis, cervical lymphadenopathy, and a large left-sided inguinal hernia.  Workup in the ED consisted of labs, CT of the neck, and CT of the abdomen and pelvis.  Patient was treated with morphine and Zofran.  The hernia was reduced after patient received morphine.  His viral swabs were unremarkable.  CBC without leukocytosis.  Hemoglobin 13.2.  CMP with low potassium of 2.8.  Glucose 122.  No evident of CKD/LEIF.  Mag and lipase normal.  Patient was given 60 mEq potassium repletion.  Patient's neck CT scan negative for acute abnormalities.  CT of the abdomen and pelvis displayed gastritis/colitis, a large left inguinal hernia without strangulation or incarceration, and esophagitis.  Patient was sent to the CDU for continued potassium replacement and IV hydration.    Patient had new EKG performed this morning which displayed no acute findings.  QT interval at 414 and no T wave changes.  CMP redrawn which showed a normal potassium of 4.1.  Patient was reassessed and does report symptomatic improvement.  He is still endorsing pain secondary to the aphthous ulcers in his mouth.  Magic Mouthwash ordered.  Also has Tylenol PRN for pain.  Patient was able to order breakfast and tolerated it without issue.  No nausea/vomiting.  No changes in urinary/bowel habits.  Patient has remained stable and ready for discharge in the presence of no acute EKG findings and normal potassium lab.  I will write him prescriptions for his symptoms including Tylenol, Zofran, lozenges, and Magic Mouthwash.  He does appear uncomfortable secondary to the hernia, therefore will give 10 Tramadol pills for breakthrough pain.  Internal referral to be  placed to both GI for his esophagitis and general surgery for the left inguinal hernia.  Patient educated that a  should be reaching out to him in the coming days to facilitate these outpatient appointments.  Patient also provided the phone number of each of these clinics.  Handout of local dental clinics provided, per the patient's request.  Return precautions discussed.  He is agreeable and understanding to the plan and all of his questions were answered to satisfaction.  He was discharged from the CDU in stable condition.    Outpatient Follow-Up  No future appointments.      Seda Lira PA-C   Emergency Medicine/Clinical Decision Unit   [facility]

## 2024-07-04 ENCOUNTER — HOSPITAL ENCOUNTER (EMERGENCY)
Facility: HOSPITAL | Age: 59
Discharge: HOME | End: 2024-07-04
Payer: COMMERCIAL

## 2024-07-04 VITALS
HEART RATE: 96 BPM | WEIGHT: 130 LBS | TEMPERATURE: 98.4 F | HEIGHT: 64 IN | RESPIRATION RATE: 18 BRPM | OXYGEN SATURATION: 97 % | BODY MASS INDEX: 22.2 KG/M2 | DIASTOLIC BLOOD PRESSURE: 89 MMHG | SYSTOLIC BLOOD PRESSURE: 145 MMHG

## 2024-07-04 DIAGNOSIS — K13.79 MOUTH SORES: Primary | ICD-10-CM

## 2024-07-04 PROCEDURE — 99283 EMERGENCY DEPT VISIT LOW MDM: CPT

## 2024-07-04 PROCEDURE — 2500000001 HC RX 250 WO HCPCS SELF ADMINISTERED DRUGS (ALT 637 FOR MEDICARE OP): Mod: SE | Performed by: PHYSICIAN ASSISTANT

## 2024-07-04 PROCEDURE — 2500000005 HC RX 250 GENERAL PHARMACY W/O HCPCS: Mod: SE | Performed by: PHYSICIAN ASSISTANT

## 2024-07-04 PROCEDURE — 99284 EMERGENCY DEPT VISIT MOD MDM: CPT | Performed by: PHYSICIAN ASSISTANT

## 2024-07-04 RX ORDER — CHLORHEXIDINE GLUCONATE ORAL RINSE 1.2 MG/ML
15 SOLUTION DENTAL ONCE
Status: COMPLETED | OUTPATIENT
Start: 2024-07-04 | End: 2024-07-04

## 2024-07-04 RX ORDER — ONDANSETRON 4 MG/1
4 TABLET, ORALLY DISINTEGRATING ORAL ONCE
Status: COMPLETED | OUTPATIENT
Start: 2024-07-04 | End: 2024-07-04

## 2024-07-04 RX ORDER — CHLORHEXIDINE GLUCONATE ORAL RINSE 1.2 MG/ML
15 SOLUTION DENTAL AS NEEDED
Qty: 473 ML | Refills: 0 | Status: ON HOLD | OUTPATIENT
Start: 2024-07-04 | End: 2024-07-11 | Stop reason: ALTCHOICE

## 2024-07-04 ASSESSMENT — PAIN SCALES - GENERAL: PAINLEVEL_OUTOF10: 6

## 2024-07-04 ASSESSMENT — PAIN - FUNCTIONAL ASSESSMENT: PAIN_FUNCTIONAL_ASSESSMENT: 0-10

## 2024-07-04 NOTE — DISCHARGE INSTRUCTIONS
Use the chlorhexidine as needed.  Continue the Zofran for nausea and vomiting.  It is very important to follow-up with your primary care doctor for good continuum of care, a referral is entered for them to schedule an appointment, if you have not heard from them call the number listed below.  Additionally for your mouth sores you can follow-up with oral surgery clinic

## 2024-07-04 NOTE — ED PROVIDER NOTES
HPI:  58-year-old male with history of HTN presenting for mouth sores.  Was admitted overnight for mouth sores to CDU 2 nights ago, discharged yesterday.  States that he was supposed to get a prescription for Peridex however never received it.  Stating his sores have improved however he would like the Peridex going forward.    Physical Exam:   GEN: Vitals noted. NAD  EYES:  EOMs grossly intact, anicteric sclera  ROMMEL: Mucosa moist.  Diffuse mild erythematous mucosa, including very small ulcers on the lower lip  NECK: Supple.  CARD: RRR  PULMONARY: Moving air well. Clear all lung fields.  ABDOMEN: Soft, no guarding, no rigidity. Nontender. NABS  EXTREMITIES: Full ROM, no pitting edema,   SKIN: Intact, warm and dry  NEURO: Alert and oriented x 3, speech is clear, no obvious deficits noted.       ----------------------------------------------------------------------------------------------------------------------------    MDM:  58-year-old male presenting for new prescription for his mouth sores.  On exam he is well-appearing and ambulating comfortably.  Vital signs stable.  Does have mild diffuse erythema around his mouth with possible small ulcers on his lip.  No other visualized ulcers or open lesions.  He is requesting Peridex, given prescription.  States he otherwise feels well and would like to go.  He is instructed to follow-up with PCP to have his potassium rechecked as well as to evaluate his mouth sores.  Additionally referral for OMFS is placed for his oral lesions.  Return precautions reviewed.    Diagnoses as of 07/04/24 1211   Mouth sores     No orders to display     Labs Reviewed - No data to display    ----------------------------------------------------------------------------------------------------------------------------    This note was dictated using a speech recognition program.  While an attempt was made at proof reading to minimize errors, minor errors in transcription may be present call for  questions.     Varun Erickson PA-C  07/04/24 [id number]

## 2024-07-04 NOTE — ED TRIAGE NOTES
States he was DC yesterday with medications for mouth sores that are causing him to be nauseated but the meds aren't working.

## 2024-07-10 ENCOUNTER — APPOINTMENT (OUTPATIENT)
Dept: RADIOLOGY | Facility: HOSPITAL | Age: 59
End: 2024-07-10
Payer: COMMERCIAL

## 2024-07-10 ENCOUNTER — CLINICAL SUPPORT (OUTPATIENT)
Dept: EMERGENCY MEDICINE | Facility: HOSPITAL | Age: 59
End: 2024-07-10
Payer: COMMERCIAL

## 2024-07-10 ENCOUNTER — HOSPITAL ENCOUNTER (INPATIENT)
Facility: HOSPITAL | Age: 59
End: 2024-07-10
Attending: EMERGENCY MEDICINE | Admitting: NURSE PRACTITIONER
Payer: COMMERCIAL

## 2024-07-10 DIAGNOSIS — K31.89 GASTRIC MASS: ICD-10-CM

## 2024-07-10 DIAGNOSIS — R93.5 ABNORMAL CT OF THE ABDOMEN: ICD-10-CM

## 2024-07-10 DIAGNOSIS — R11.2 NAUSEA AND VOMITING, UNSPECIFIED VOMITING TYPE: ICD-10-CM

## 2024-07-10 DIAGNOSIS — I10 HYPERTENSION, UNSPECIFIED TYPE: ICD-10-CM

## 2024-07-10 DIAGNOSIS — K04.7 DENTAL ABSCESS: ICD-10-CM

## 2024-07-10 DIAGNOSIS — E86.0 DEHYDRATION: ICD-10-CM

## 2024-07-10 DIAGNOSIS — R11.2 INTRACTABLE NAUSEA AND VOMITING: ICD-10-CM

## 2024-07-10 DIAGNOSIS — R55 SYNCOPE, UNSPECIFIED SYNCOPE TYPE: ICD-10-CM

## 2024-07-10 DIAGNOSIS — R62.7 FAILURE TO THRIVE IN ADULT: Primary | ICD-10-CM

## 2024-07-10 DIAGNOSIS — E83.51 HYPOCALCEMIA: ICD-10-CM

## 2024-07-10 DIAGNOSIS — K22.89 ESOPHAGEAL THICKENING: ICD-10-CM

## 2024-07-10 DIAGNOSIS — K12.1 STOMATITIS: ICD-10-CM

## 2024-07-10 DIAGNOSIS — E87.6 HYPOKALEMIA: ICD-10-CM

## 2024-07-10 DIAGNOSIS — R13.10 ODYNOPHAGIA: ICD-10-CM

## 2024-07-10 LAB
ABO GROUP (TYPE) IN BLOOD: NORMAL
ALBUMIN SERPL BCP-MCNC: 4.4 G/DL (ref 3.4–5)
ALP SERPL-CCNC: 67 U/L (ref 33–136)
ALT SERPL W P-5'-P-CCNC: 28 U/L (ref 10–52)
ANION GAP BLDV CALCULATED.4IONS-SCNC: 11 MMOL/L (ref 10–25)
ANION GAP SERPL CALC-SCNC: 23 MMOL/L
ANTIBODY SCREEN: NORMAL
AST SERPL W P-5'-P-CCNC: 28 U/L (ref 9–39)
BASE EXCESS BLDV CALC-SCNC: 11.1 MMOL/L (ref -2–3)
BASOPHILS # BLD AUTO: 0.01 X10*3/UL (ref 0–0.1)
BASOPHILS NFR BLD AUTO: 0.1 %
BILIRUB SERPL-MCNC: 1.4 MG/DL (ref 0–1.2)
BODY TEMPERATURE: 37 DEGREES CELSIUS
BUN SERPL-MCNC: 25 MG/DL (ref 6–23)
CA-I BLDV-SCNC: 1 MMOL/L (ref 1.1–1.33)
CALCIUM SERPL-MCNC: 9.2 MG/DL (ref 8.6–10.6)
CARDIAC TROPONIN I PNL SERPL HS: 16 NG/L (ref 0–53)
CHLORIDE BLDV-SCNC: 91 MMOL/L (ref 98–107)
CHLORIDE SERPL-SCNC: 87 MMOL/L (ref 98–107)
CO2 SERPL-SCNC: 30 MMOL/L (ref 21–32)
CREAT SERPL-MCNC: 1.63 MG/DL (ref 0.5–1.3)
EGFRCR SERPLBLD CKD-EPI 2021: 48 ML/MIN/1.73M*2
EOSINOPHIL # BLD AUTO: 0.01 X10*3/UL (ref 0–0.7)
EOSINOPHIL NFR BLD AUTO: 0.1 %
ERYTHROCYTE [DISTWIDTH] IN BLOOD BY AUTOMATED COUNT: 11.9 % (ref 11.5–14.5)
GLUCOSE BLDV-MCNC: 163 MG/DL (ref 74–99)
GLUCOSE SERPL-MCNC: 153 MG/DL (ref 74–99)
HCO3 BLDV-SCNC: 35.1 MMOL/L (ref 22–26)
HCT VFR BLD AUTO: 43.6 % (ref 41–52)
HCT VFR BLD EST: 44 % (ref 41–52)
HGB BLD-MCNC: 15.8 G/DL (ref 13.5–17.5)
HGB BLDV-MCNC: 14.8 G/DL (ref 13.5–17.5)
HIV 1+2 AB+HIV1 P24 AG SERPL QL IA: NONREACTIVE
IMM GRANULOCYTES # BLD AUTO: 0.04 X10*3/UL (ref 0–0.7)
IMM GRANULOCYTES NFR BLD AUTO: 0.6 % (ref 0–0.9)
LACTATE BLDV-SCNC: 5.4 MMOL/L (ref 0.4–2)
LACTATE SERPL-SCNC: 2.2 MMOL/L (ref 0.4–2)
LACTATE SERPL-SCNC: 4.3 MMOL/L (ref 0.4–2)
LIPASE SERPL-CCNC: NORMAL U/L
LYMPHOCYTES # BLD AUTO: 1.02 X10*3/UL (ref 1.2–4.8)
LYMPHOCYTES NFR BLD AUTO: 14.3 %
MAGNESIUM SERPL-MCNC: 1.91 MG/DL (ref 1.6–2.4)
MCH RBC QN AUTO: 32.2 PG (ref 26–34)
MCHC RBC AUTO-ENTMCNC: 36.2 G/DL (ref 32–36)
MCV RBC AUTO: 89 FL (ref 80–100)
MONOCYTES # BLD AUTO: 0.63 X10*3/UL (ref 0.1–1)
MONOCYTES NFR BLD AUTO: 8.9 %
NEUTROPHILS # BLD AUTO: 5.4 X10*3/UL (ref 1.2–7.7)
NEUTROPHILS NFR BLD AUTO: 76 %
NRBC BLD-RTO: 0 /100 WBCS (ref 0–0)
OXYHGB MFR BLDV: 37.2 % (ref 45–75)
PCO2 BLDV: 42 MM HG (ref 41–51)
PH BLDV: 7.53 PH (ref 7.33–7.43)
PHOSPHATE SERPL-MCNC: 3.7 MG/DL (ref 2.5–4.9)
PLATELET # BLD AUTO: 429 X10*3/UL (ref 150–450)
PO2 BLDV: 37 MM HG (ref 35–45)
POTASSIUM BLDV-SCNC: 2.5 MMOL/L (ref 3.5–5.3)
POTASSIUM SERPL-SCNC: 2.1 MMOL/L (ref 3.5–5.3)
POTASSIUM SERPL-SCNC: 2.3 MMOL/L (ref 3.5–5.3)
PROT SERPL-MCNC: 7.3 G/DL (ref 6.4–8.2)
RBC # BLD AUTO: 4.91 X10*6/UL (ref 4.5–5.9)
RH FACTOR (ANTIGEN D): NORMAL
SAO2 % BLDV: 38 % (ref 45–75)
SARS-COV-2 RNA RESP QL NAA+PROBE: NOT DETECTED
SODIUM BLDV-SCNC: 135 MMOL/L (ref 136–145)
SODIUM SERPL-SCNC: 138 MMOL/L (ref 136–145)
WBC # BLD AUTO: 7.1 X10*3/UL (ref 4.4–11.3)

## 2024-07-10 PROCEDURE — 74177 CT ABD & PELVIS W/CONTRAST: CPT

## 2024-07-10 PROCEDURE — 85025 COMPLETE CBC W/AUTO DIFF WBC: CPT | Performed by: PHYSICIAN ASSISTANT

## 2024-07-10 PROCEDURE — 93005 ELECTROCARDIOGRAM TRACING: CPT

## 2024-07-10 PROCEDURE — 71275 CT ANGIOGRAPHY CHEST: CPT | Performed by: RADIOLOGY

## 2024-07-10 PROCEDURE — 87040 BLOOD CULTURE FOR BACTERIA: CPT | Performed by: PHYSICIAN ASSISTANT

## 2024-07-10 PROCEDURE — 84132 ASSAY OF SERUM POTASSIUM: CPT | Performed by: EMERGENCY MEDICINE

## 2024-07-10 PROCEDURE — 87635 SARS-COV-2 COVID-19 AMP PRB: CPT | Performed by: PHYSICIAN ASSISTANT

## 2024-07-10 PROCEDURE — 99285 EMERGENCY DEPT VISIT HI MDM: CPT | Performed by: PHYSICIAN ASSISTANT

## 2024-07-10 PROCEDURE — 70450 CT HEAD/BRAIN W/O DYE: CPT | Performed by: RADIOLOGY

## 2024-07-10 PROCEDURE — 71045 X-RAY EXAM CHEST 1 VIEW: CPT | Performed by: RADIOLOGY

## 2024-07-10 PROCEDURE — 2500000004 HC RX 250 GENERAL PHARMACY W/ HCPCS (ALT 636 FOR OP/ED): Mod: SE

## 2024-07-10 PROCEDURE — 96366 THER/PROPH/DIAG IV INF ADDON: CPT

## 2024-07-10 PROCEDURE — 2500000004 HC RX 250 GENERAL PHARMACY W/ HCPCS (ALT 636 FOR OP/ED): Mod: SE | Performed by: PHYSICIAN ASSISTANT

## 2024-07-10 PROCEDURE — 83735 ASSAY OF MAGNESIUM: CPT | Performed by: PHYSICIAN ASSISTANT

## 2024-07-10 PROCEDURE — 87389 HIV-1 AG W/HIV-1&-2 AB AG IA: CPT | Performed by: PHYSICIAN ASSISTANT

## 2024-07-10 PROCEDURE — 71275 CT ANGIOGRAPHY CHEST: CPT

## 2024-07-10 PROCEDURE — 84132 ASSAY OF SERUM POTASSIUM: CPT

## 2024-07-10 PROCEDURE — 96374 THER/PROPH/DIAG INJ IV PUSH: CPT | Mod: 59

## 2024-07-10 PROCEDURE — 2550000001 HC RX 255 CONTRASTS: Mod: SE | Performed by: EMERGENCY MEDICINE

## 2024-07-10 PROCEDURE — 74177 CT ABD & PELVIS W/CONTRAST: CPT | Performed by: STUDENT IN AN ORGANIZED HEALTH CARE EDUCATION/TRAINING PROGRAM

## 2024-07-10 PROCEDURE — 70450 CT HEAD/BRAIN W/O DYE: CPT

## 2024-07-10 PROCEDURE — 83605 ASSAY OF LACTIC ACID: CPT | Performed by: PHYSICIAN ASSISTANT

## 2024-07-10 PROCEDURE — 99285 EMERGENCY DEPT VISIT HI MDM: CPT

## 2024-07-10 PROCEDURE — 84484 ASSAY OF TROPONIN QUANT: CPT | Performed by: PHYSICIAN ASSISTANT

## 2024-07-10 PROCEDURE — 36415 COLL VENOUS BLD VENIPUNCTURE: CPT | Performed by: PHYSICIAN ASSISTANT

## 2024-07-10 PROCEDURE — 83036 HEMOGLOBIN GLYCOSYLATED A1C: CPT | Performed by: NURSE PRACTITIONER

## 2024-07-10 PROCEDURE — 86901 BLOOD TYPING SEROLOGIC RH(D): CPT | Performed by: PHYSICIAN ASSISTANT

## 2024-07-10 PROCEDURE — 84132 ASSAY OF SERUM POTASSIUM: CPT | Performed by: PHYSICIAN ASSISTANT

## 2024-07-10 PROCEDURE — 96365 THER/PROPH/DIAG IV INF INIT: CPT

## 2024-07-10 PROCEDURE — 93010 ELECTROCARDIOGRAM REPORT: CPT | Performed by: PHYSICIAN ASSISTANT

## 2024-07-10 PROCEDURE — 71045 X-RAY EXAM CHEST 1 VIEW: CPT

## 2024-07-10 PROCEDURE — 83690 ASSAY OF LIPASE: CPT | Performed by: PHYSICIAN ASSISTANT

## 2024-07-10 PROCEDURE — 96368 THER/DIAG CONCURRENT INF: CPT

## 2024-07-10 PROCEDURE — 84100 ASSAY OF PHOSPHORUS: CPT | Performed by: PHYSICIAN ASSISTANT

## 2024-07-10 PROCEDURE — 96375 TX/PRO/DX INJ NEW DRUG ADDON: CPT

## 2024-07-10 PROCEDURE — 2060000001 HC INTERMEDIATE ICU ROOM DAILY

## 2024-07-10 RX ORDER — MORPHINE SULFATE 4 MG/ML
4 INJECTION INTRAVENOUS ONCE
Status: COMPLETED | OUTPATIENT
Start: 2024-07-10 | End: 2024-07-10

## 2024-07-10 RX ORDER — AMOXICILLIN 500 MG/1
1 TABLET, FILM COATED ORAL EVERY 8 HOURS
Status: ON HOLD | COMMUNITY
End: 2024-07-11 | Stop reason: ALTCHOICE

## 2024-07-10 RX ORDER — ONDANSETRON HYDROCHLORIDE 2 MG/ML
4 INJECTION, SOLUTION INTRAVENOUS ONCE
Status: COMPLETED | OUTPATIENT
Start: 2024-07-10 | End: 2024-07-10

## 2024-07-10 RX ORDER — POLYETHYLENE GLYCOL 3350 17 G/17G
17 POWDER, FOR SOLUTION ORAL DAILY
Status: DISCONTINUED | OUTPATIENT
Start: 2024-07-11 | End: 2024-07-16 | Stop reason: HOSPADM

## 2024-07-10 RX ORDER — HYDRALAZINE HYDROCHLORIDE 20 MG/ML
5 INJECTION INTRAMUSCULAR; INTRAVENOUS EVERY 4 HOURS PRN
Status: DISCONTINUED | OUTPATIENT
Start: 2024-07-10 | End: 2024-07-13

## 2024-07-10 RX ORDER — ACETAMINOPHEN 325 MG/1
650 TABLET ORAL EVERY 4 HOURS PRN
Status: DISCONTINUED | OUTPATIENT
Start: 2024-07-10 | End: 2024-07-16 | Stop reason: HOSPADM

## 2024-07-10 RX ORDER — POTASSIUM CHLORIDE 14.9 MG/ML
20 INJECTION INTRAVENOUS
Status: COMPLETED | OUTPATIENT
Start: 2024-07-10 | End: 2024-07-10

## 2024-07-10 RX ORDER — HYDROMORPHONE HYDROCHLORIDE 1 MG/ML
0.5 INJECTION, SOLUTION INTRAMUSCULAR; INTRAVENOUS; SUBCUTANEOUS ONCE
Status: COMPLETED | OUTPATIENT
Start: 2024-07-10 | End: 2024-07-10

## 2024-07-10 RX ORDER — LABETALOL HYDROCHLORIDE 5 MG/ML
10 INJECTION, SOLUTION INTRAVENOUS ONCE
Status: COMPLETED | OUTPATIENT
Start: 2024-07-10 | End: 2024-07-10

## 2024-07-10 RX ORDER — IBUPROFEN 600 MG/1
600 TABLET ORAL EVERY 8 HOURS PRN
Status: ON HOLD | COMMUNITY
End: 2024-07-11 | Stop reason: ALTCHOICE

## 2024-07-10 RX ORDER — POTASSIUM CHLORIDE 14.9 MG/ML
20 INJECTION INTRAVENOUS
Status: DISPENSED | OUTPATIENT
Start: 2024-07-10 | End: 2024-07-11

## 2024-07-10 RX ORDER — CALCIUM GLUCONATE 20 MG/ML
2 INJECTION, SOLUTION INTRAVENOUS ONCE
Status: COMPLETED | OUTPATIENT
Start: 2024-07-10 | End: 2024-07-10

## 2024-07-10 SDOH — SOCIAL STABILITY: SOCIAL INSECURITY: HAS ANYONE EVER THREATENED TO HURT YOUR FAMILY OR YOUR PETS?: NO

## 2024-07-10 SDOH — SOCIAL STABILITY: SOCIAL INSECURITY: ABUSE: ADULT

## 2024-07-10 SDOH — SOCIAL STABILITY: SOCIAL INSECURITY: DOES ANYONE TRY TO KEEP YOU FROM HAVING/CONTACTING OTHER FRIENDS OR DOING THINGS OUTSIDE YOUR HOME?: NO

## 2024-07-10 SDOH — SOCIAL STABILITY: SOCIAL INSECURITY: DO YOU FEEL ANYONE HAS EXPLOITED OR TAKEN ADVANTAGE OF YOU FINANCIALLY OR OF YOUR PERSONAL PROPERTY?: NO

## 2024-07-10 SDOH — SOCIAL STABILITY: SOCIAL INSECURITY: ARE THERE ANY APPARENT SIGNS OF INJURIES/BEHAVIORS THAT COULD BE RELATED TO ABUSE/NEGLECT?: NO

## 2024-07-10 SDOH — SOCIAL STABILITY: SOCIAL INSECURITY: DO YOU FEEL UNSAFE GOING BACK TO THE PLACE WHERE YOU ARE LIVING?: NO

## 2024-07-10 SDOH — SOCIAL STABILITY: SOCIAL INSECURITY: HAVE YOU HAD THOUGHTS OF HARMING ANYONE ELSE?: NO

## 2024-07-10 SDOH — SOCIAL STABILITY: SOCIAL INSECURITY: HAVE YOU HAD ANY THOUGHTS OF HARMING ANYONE ELSE?: NO

## 2024-07-10 SDOH — SOCIAL STABILITY: SOCIAL INSECURITY: ARE YOU OR HAVE YOU BEEN THREATENED OR ABUSED PHYSICALLY, EMOTIONALLY, OR SEXUALLY BY ANYONE?: NO

## 2024-07-10 ASSESSMENT — PATIENT HEALTH QUESTIONNAIRE - PHQ9
SUM OF ALL RESPONSES TO PHQ9 QUESTIONS 1 & 2: 0
2. FEELING DOWN, DEPRESSED OR HOPELESS: NOT AT ALL
1. LITTLE INTEREST OR PLEASURE IN DOING THINGS: NOT AT ALL

## 2024-07-10 ASSESSMENT — PAIN DESCRIPTION - LOCATION: LOCATION: ABDOMEN

## 2024-07-10 ASSESSMENT — PAIN DESCRIPTION - DIRECTION: RADIATING_TOWARDS: LLQ

## 2024-07-10 ASSESSMENT — LIFESTYLE VARIABLES
AUDIT-C TOTAL SCORE: 0
AUDIT-C TOTAL SCORE: 0
HOW OFTEN DO YOU HAVE A DRINK CONTAINING ALCOHOL: NEVER
HAVE YOU EVER FELT YOU SHOULD CUT DOWN ON YOUR DRINKING: NO
HOW OFTEN DO YOU HAVE 6 OR MORE DRINKS ON ONE OCCASION: NEVER
HAVE PEOPLE ANNOYED YOU BY CRITICIZING YOUR DRINKING: NO
PRESCIPTION_ABUSE_PAST_12_MONTHS: NO
TOTAL SCORE: 0
EVER HAD A DRINK FIRST THING IN THE MORNING TO STEADY YOUR NERVES TO GET RID OF A HANGOVER: NO
EVER FELT BAD OR GUILTY ABOUT YOUR DRINKING: NO
SUBSTANCE_ABUSE_PAST_12_MONTHS: NO
HOW MANY STANDARD DRINKS CONTAINING ALCOHOL DO YOU HAVE ON A TYPICAL DAY: PATIENT DOES NOT DRINK
SKIP TO QUESTIONS 9-10: 1

## 2024-07-10 ASSESSMENT — COGNITIVE AND FUNCTIONAL STATUS - GENERAL
DAILY ACTIVITIY SCORE: 17
WALKING IN HOSPITAL ROOM: A LOT
MOVING TO AND FROM BED TO CHAIR: A LITTLE
TURNING FROM BACK TO SIDE WHILE IN FLAT BAD: A LITTLE
CLIMB 3 TO 5 STEPS WITH RAILING: A LOT
TOILETING: A LITTLE
MOVING FROM LYING ON BACK TO SITTING ON SIDE OF FLAT BED WITH BEDRAILS: A LITTLE
MOBILITY SCORE: 16
DRESSING REGULAR UPPER BODY CLOTHING: A LITTLE
STANDING UP FROM CHAIR USING ARMS: A LITTLE
PATIENT BASELINE BEDBOUND: NO
PERSONAL GROOMING: A LITTLE
EATING MEALS: A LITTLE
DRESSING REGULAR LOWER BODY CLOTHING: A LITTLE
HELP NEEDED FOR BATHING: A LOT

## 2024-07-10 ASSESSMENT — COLUMBIA-SUICIDE SEVERITY RATING SCALE - C-SSRS
1. IN THE PAST MONTH, HAVE YOU WISHED YOU WERE DEAD OR WISHED YOU COULD GO TO SLEEP AND NOT WAKE UP?: NO
2. HAVE YOU ACTUALLY HAD ANY THOUGHTS OF KILLING YOURSELF?: NO
2. HAVE YOU ACTUALLY HAD ANY THOUGHTS OF KILLING YOURSELF?: NO
1. SINCE LAST CONTACT, HAVE YOU WISHED YOU WERE DEAD OR WISHED YOU COULD GO TO SLEEP AND NOT WAKE UP?: NO
1. IN THE PAST MONTH, HAVE YOU WISHED YOU WERE DEAD OR WISHED YOU COULD GO TO SLEEP AND NOT WAKE UP?: NO
6. HAVE YOU EVER DONE ANYTHING, STARTED TO DO ANYTHING, OR PREPARED TO DO ANYTHING TO END YOUR LIFE?: NO
2. HAVE YOU ACTUALLY HAD ANY THOUGHTS OF KILLING YOURSELF?: NO

## 2024-07-10 ASSESSMENT — ACTIVITIES OF DAILY LIVING (ADL)
FEEDING YOURSELF: INDEPENDENT
PATIENT'S MEMORY ADEQUATE TO SAFELY COMPLETE DAILY ACTIVITIES?: YES
WALKS IN HOME: INDEPENDENT
DRESSING YOURSELF: INDEPENDENT
BATHING: INDEPENDENT
JUDGMENT_ADEQUATE_SAFELY_COMPLETE_DAILY_ACTIVITIES: YES
ADEQUATE_TO_COMPLETE_ADL: YES
GROOMING: INDEPENDENT
TOILETING: NEEDS ASSISTANCE
HEARING - RIGHT EAR: FUNCTIONAL

## 2024-07-10 ASSESSMENT — PAIN - FUNCTIONAL ASSESSMENT: PAIN_FUNCTIONAL_ASSESSMENT: 0-10

## 2024-07-10 ASSESSMENT — PAIN DESCRIPTION - DESCRIPTORS: DESCRIPTORS: CRAMPING

## 2024-07-10 ASSESSMENT — PAIN SCALES - GENERAL
PAINLEVEL_OUTOF10: 8

## 2024-07-10 NOTE — ED PROVIDER NOTES
This is a 58-year-old male with a past medical history of hypertension, BPH, as well as remote history of perforated gastric ulcer who presents to the ED with nausea, vomiting, diarrhea, diffuse abdominal pain as well as generalized malaise.  He states that he has been having the symptoms for approximately 2 weeks.  He endorses having a significant weight loss of approximately 45 pounds over the past 2 to 3 weeks.  He feels as if his symptoms are worsening today.  He states that today he noticed a small amount of bright red blood in his emesis.  He denies any coffee-ground emesis.  He does endorse significant fatigue as well as lightheadedness.  He states that when he got up earlier to go to the bathroom he got lightheaded and fell backwards against the door, hit his head and endorsed having LOC.  He is unsure of how long he was unconscious but remembers waking up on the floor.  He is on a baby aspirin.  He denies anticoagulation use.  Per EMS patient was hypotensive at 80/60 upon arrival and was started on IV fluids, patient's blood pressure improved to 146/72 following approximately 200 cc bolus.  Heart rate for them was 110.  Blood glucose 200.  They also stated patient had an episode of emesis and SpO2 dropped to 88% so he was placed on 4 L with improvement of his SpO2.  Patient has no known history of lung disease.  The patient does state that he was recently seen here for similar symptoms approximately 1 week ago and was diagnosed with esophagitis, gastritis, and colitis.  He was found to be hypokalemic and was admitted to the observation unit for electrolyte replacement.  He feels as if symptoms are worsening and is concerned that they have persisted.  He has not yet been able to follow-up with GI as an outpatient for further workup for his esophagitis.  He denies smoking history.  He does endorse a previous daily alcohol use, however states that he has not drank any alcohol in approximately 3 weeks due to  "his nausea and vomiting.  He denies any illicit drug use.      History provided by:  Patient, medical records and EMS personnel   used: No             Visit Vitals  BP (!) 120/97   Pulse 90   Temp 37 °C (98.6 °F) (Temporal)   Resp 15   Ht 1.676 m (5' 6\")   Wt (!) 44.5 kg (98 lb)   SpO2 100%   BMI 15.82 kg/m²   Smoking Status Former   BSA 1.44 m²          Physical Exam     Physical exam:   General: Vitals noted, no distress. Afebrile.  Very thin.  EENT:  Hearing grossly intact. Normal phonation. MMM. Airway patient. PERRL. EOMI. mucous membranes dry.  Neck: No midline tenderness or paraspinal tenderness. FROM.   Cardiac: Tachycardic, regular rhythm. normal S1 and S2.  No murmurs, gallops, rubs.   Pulmonary: Good air exchange. Lungs clear bilaterally. No wheezes, rhonchi, rales. No accessory muscle use.   Abdomen: Soft, left inguinal hernia is easily reducible.  Diffuse abdominal tenderness palpation.  Voluntary guarding of the left lower quadrant.  No rebound tenderness.  No peritoneal signs. Normoactive bowel sounds.   Back: No CVA tenderness. No midline tenderness or paraspinal tenderness. No obvious deformity.   Extremities: No peripheral edema.  Full range of motion. Moves all extremities freely. No tenderness throughout extremities.   Skin: No rash. Warm and Dry.  Poor skin turgor.  Neuro: No focal neurologic deficits. CN 2-12 grossly intact. Sensation equal bilaterally. No weakness.         Labs Reviewed   CBC WITH AUTO DIFFERENTIAL - Abnormal       Result Value    WBC 7.1      nRBC 0.0      RBC 4.91      Hemoglobin 15.8      Hematocrit 43.6      MCV 89      MCH 32.2      MCHC 36.2 (*)     RDW 11.9      Platelets 429      Neutrophils % 76.0      Immature Granulocytes %, Automated 0.6      Lymphocytes % 14.3      Monocytes % 8.9      Eosinophils % 0.1      Basophils % 0.1      Neutrophils Absolute 5.40      Immature Granulocytes Absolute, Automated 0.04      Lymphocytes Absolute 1.02 (*)     " Monocytes Absolute 0.63      Eosinophils Absolute 0.01      Basophils Absolute 0.01     COMPREHENSIVE METABOLIC PANEL - Abnormal    Glucose 153 (*)     Sodium 138      Potassium 2.1 (*)     Chloride 87 (*)     Bicarbonate 30      Anion Gap 23      Urea Nitrogen 25 (*)     Creatinine 1.63 (*)     eGFR 48 (*)     Calcium 9.2      Albumin 4.4      Alkaline Phosphatase 67      Total Protein 7.3      AST 28      Bilirubin, Total 1.4 (*)     ALT 28     LACTATE - Abnormal    Lactate 4.3 (*)     Narrative:     Venipuncture immediately after or during the administration of Metamizole may lead to falsely low results. Testing should be performed immediately  prior to Metamizole dosing.   LACTATE - Abnormal    Lactate 2.2 (*)     Narrative:     Venipuncture immediately after or during the administration of Metamizole may lead to falsely low results. Testing should be performed immediately  prior to Metamizole dosing.   POTASSIUM - Abnormal    Potassium 2.3 (*)    RENAL FUNCTION PANEL - Abnormal    Glucose 113 (*)     Sodium 140      Potassium 3.4 (*)     Chloride 99      Bicarbonate 28      Anion Gap 16      Urea Nitrogen 23      Creatinine 1.31 (*)     eGFR 62      Calcium 8.5 (*)     Phosphorus 4.4      Albumin 3.5     CBC - Abnormal    WBC 6.2      nRBC 0.0      RBC 3.88 (*)     Hemoglobin 12.5 (*)     Hematocrit 37.0 (*)     MCV 95      MCH 32.2      MCHC 33.8      RDW 12.3      Platelets 367     RENAL FUNCTION PANEL - Abnormal    Glucose 130 (*)     Sodium 140      Potassium 3.4 (*)     Chloride 101      Bicarbonate 27      Anion Gap 15      Urea Nitrogen 22      Creatinine 1.30      eGFR 63      Calcium 8.7      Phosphorus 2.8      Albumin 3.6     VITAMIN D 25-HYDROXY,TOTAL - Abnormal    Vitamin D, 25-Hydroxy, Total 6 (*)     Narrative:     Deficiency:         < 20   ng/ml  Insufficiency:      20-29  ng/ml  Sufficiency:         ng/ml  This assay accurately quantifies the sum of Vitamin D3, 25-Hydroxy and Vitamin  D2,25-Hydroxy.   HEMOGLOBIN A1C - Abnormal    Hemoglobin A1C 5.8 (*)     Estimated Average Glucose 120      Narrative:     Diagnosis of Diabetes-Adults  Non-Diabetic: < or = 5.6%  Increased risk for developing diabetes: 5.7-6.4%  Diagnostic of diabetes: > or = 6.5%       RENAL FUNCTION PANEL - Abnormal    Glucose 124 (*)     Sodium 138      Potassium 3.5      Chloride 102      Bicarbonate 31      Anion Gap 9 (*)     Urea Nitrogen 19      Creatinine 1.10      eGFR 77      Calcium 8.5 (*)     Phosphorus 1.6 (*)     Albumin 3.5     CBC - Abnormal    WBC 6.0      nRBC 0.0      RBC 3.15 (*)     Hemoglobin 10.0 (*)     Hematocrit 29.7 (*)     MCV 94      MCH 31.7      MCHC 33.7      RDW 12.1      Platelets 318     RENAL FUNCTION PANEL - Abnormal    Glucose 92      Sodium 139      Potassium 3.1 (*)     Chloride 104      Bicarbonate 28      Anion Gap 10      Urea Nitrogen 12      Creatinine 0.83      eGFR >90      Calcium 7.9 (*)     Phosphorus 1.8 (*)     Albumin 3.1 (*)    COAGULATION SCREEN - Abnormal    Protime 11.3      INR 1.0      aPTT 23 (*)     Narrative:     The APTT is no longer used for monitoring Unfractionated Heparin Therapy. For monitoring Heparin Therapy, use the Heparin Assay.   RENAL FUNCTION PANEL - Abnormal    Glucose 89      Sodium 139      Potassium 3.7      Chloride 103      Bicarbonate 27      Anion Gap 13      Urea Nitrogen 7      Creatinine 0.69      eGFR >90      Calcium 7.8 (*)     Phosphorus 2.1 (*)     Albumin 3.3 (*)    BLOOD CULTURE - Normal    Blood Culture No growth at 2 days     BLOOD CULTURE - Normal    Blood Culture No growth at 2 days     MAGNESIUM - Normal    Magnesium 1.91     PHOSPHORUS - Normal    Phosphorus 3.7     SARS-COV-2 PCR - Normal    Coronavirus 2019, PCR Not Detected      Narrative:     This assay has received FDA Emergency Use Authorization (EUA) and is only authorized for the duration of time that circumstances exist to justify the authorization of the emergency use of  in vitro diagnostic tests for the detection of SARS-CoV-2 virus and/or diagnosis of COVID-19 infection under section 564(b)(1) of the Act, 21 U.S.C. 360bbb-3(b)(1). This assay is an in vitro diagnostic nucleic acid amplification test for the qualitative detection of SARS-CoV-2 from nasopharyngeal specimens and has been validated for use at [facility]. Negative results do not preclude COVID-19 infections and should not be used as the sole basis for diagnosis, treatment, or other management decisions.     HIV 1/2 ANTIGEN/ANTIBODY SCREEN [facility] REFLEX TO CONFIRMATION - Normal    HIV 1/2 Antigen/Antibody Screen with Reflex to Confirmation Nonreactive      Narrative:     HIV Ag/Ab screen is performed using the Siemens [employer] HIV Ag/Ab Combo assay which detects the presence of HIV p24 antigen as well as antibodies to HIV-1 (Group M and O) and HIV-2.  No laboratory evidence of HIV infection. If acute HIV infection is suspected, consider testing for HIV RNA by PCR (viral load).   TROPONIN I, HIGH SENSITIVITY - Normal    Troponin I, High Sensitivity (CMC) 16      Narrative:     Less than 99th percentile of normal range cutoff-  Female and children under 18 years old <35 ng/L; Male <54 ng/L: Negative  Repeat testing should be performed if clinically indicated.     Female and children under 18 years old  ng/L; Male  ng/L:  Consistent with possible cardiac damage and possible increased clinical   risk. Serial measurements may help to assess extent of myocardial damage.     >120 ng/L: Consistent with cardiac damage, increased clinical risk and  myocardial infarction. Serial measurements may help assess extent of   myocardial damage.      NOTE: Children less than 1 year old may have higher baseline troponin   levels and results should be interpreted in conjunction with the overall   clinical context.    NOTE: Troponin I testing is performed using a different   testing methodology at [place]  [facility] than at other   system . Direct result comparisons should only   be made within the same method.     MAGNESIUM - Normal    Magnesium 1.78     LACTATE - Normal    Lactate 2.0      Narrative:     Venipuncture immediately after or during the administration of Metamizole may lead to falsely low results. Testing should be performed immediately  prior to Metamizole dosing.   VITAMIN B12 - Normal    Vitamin B12 282     FOLATE - Normal    Folate, Serum 8.1      Narrative:     Low           <3.4  Borderline 3.4-5.0  Normal        >5.0    Patients receiving more than 5 mg/day of biotin may have interference in test results. A sample should be taken no sooner than eight hours after previous dose. Contact the testing laboratory for additional information.    LACTATE - Normal    Lactate 1.8      Narrative:     Venipuncture immediately after or during the administration of Metamizole may lead to falsely low results. Testing should be performed immediately  prior to Metamizole dosing.   TSH WITH REFLEX TO FREE T4 IF ABNORMAL - Normal    Thyroid Stimulating Hormone 1.13      Narrative:     TSH testing is performed using different testing methodology at [facility] than at other [facility]. Direct result comparisons should only be made within the same method.     CANCER ANTIGEN 19-9 - Normal    Cancer AG 19-9 24.77      Narrative:     CA 19-9 testing is performed by chemiluminescent  immunoassay using the Siemens StaffInsight. Values obtained with different analytic methods cannot be  used interchangeably.    Serum CA 19-9 measurement is indicated for the serial measurement of CA 19-9 to aid in the management of patients diagnosed with cancers of the exocrine pancreas. This assay is not intended for screening or diagnosis of cancer in the general population. The results must not be used as the sole means for clinical diagnosis or patient management decisions.    Patients known to be  genotypically negative for the Corbin blood group antigens will be unable to produce CA 19-9 antigen, even in malignant tissue. Phenotyping for the presence of the Corbin antigen may be insufficient to  detect true Corbin antigen negative individuals.    The results must not be used as the sole means for clinical diagnosis or patient management decisions.   MAGNESIUM - Normal    Magnesium 2.27     MAGNESIUM - Normal    Magnesium 1.92     POCT GLUCOSE - Normal    POCT Glucose 96     LIPASE    Lipase        Narrative:     Venipuncture immediately after or during the administration of Metamizole may lead to falsely low results. Testing should be performed immediately prior to Metamizole dosing.   TYPE AND SCREEN    ABO TYPE O      Rh TYPE POS      ANTIBODY SCREEN NEG     BLOOD GAS VENOUS FULL PANEL   URINALYSIS WITH REFLEX CULTURE AND MICROSCOPIC    Narrative:     The following orders were created for panel order Urinalysis with Reflex Culture and Microscopic.  Procedure                               Abnormality         Status                     ---------                               -----------         ------                     Urinalysis with Reflex C...[[id number]]                                                 Extra Urine Gray Tube[[id number]]                                                         Please view results for these tests on the individual orders.   URINALYSIS WITH REFLEX CULTURE AND MICROSCOPIC   EXTRA URINE GRAY TUBE   CBC   RENAL FUNCTION PANEL   MAGNESIUM   IRON AND TIBC   FERRITIN   SURGICAL PATHOLOGY EXAM           XR panorex   Final Result   1. Periapical lucencies are noted involving the right mandibular 2nd   premolar and right mandibular 1st molar consistent with periapical   abscess.        Signed by: Ijeoma Herrera 7/12/2024 10:17 AM   Dictation workstation:   [id number]      Transthoracic Echo (TTE) Complete   Final Result      CT abdomen pelvis w IV contrast   Final Result   1.  A 2.0 x  1.5 cm exophytic mass within the posterior wall of the   gastric fundus, concerning for underlying neoplasm, possibly   gastrointestinal stromal tumor (GIST). Recommend further evaluation   via endoscopy.   2. Redemonstration of circumferential mural thickening of the distal   esophagus with patulous esophagus, concerning for esophagitis.   3. Extensive sigmoid colonic diverticulosis without evidence of acute   diverticulitis.   4. Interval decrease in size of small bowel containing left inguinal   hernia compared to 07/02/2024 CT. No evidence bowel wall thickening   or obstruction.   5. Additional chronic findings as described above.             I personally reviewed the images/study and I agree with the findings   as stated. This study was interpreted at [facility], [place], [place].        MACRO:   Jose Conner discussed the significance and urgency of this critical   finding by telephone with Gonzalo Dodson DO on 7/10/2024 at 3:53 pm.   (**-RCF-**) Findings:  See findings.        Signed by: Dilshad Alvarado 7/10/2024 4:10 PM   Dictation workstation:   [id number]      CT angio chest for pulmonary embolism   Final Result   1. No evidence of acute pulmonary embolism.   2. Mild upper lung predominant emphysema.   3. 4 mm left upper lobe nodule as described above.   Instructions:  No further follow-up is required, however, if the   patient has high risk factors for primary lung malignancy, follow-up   noncontrast CT scan chest in 12 months may be obtained. (Manuel Woohodinesh et al., Guidelines for management of incidental pulmonary   nodules detected on CT images: From the Fleischner Society 2017,   Radiology. 2017 Jul;284 (1):228-243.) [email or web address]   4. Patulous esophagus with debris and ingested material. Correlate   with motility disorder and reflux.   5. Partially imaged fat density lesion in the left flank, likely a   lipoma.        MACRO:   None        Signed by: Mariel  Pettycaitlyn Hatch 7/10/2024 3:25 PM   Dictation workstation:   [id number]      CT head wo IV contrast   Final Result   No acute intracranial abnormality.        Signed by: Britt Rubi 7/10/2024 3:08 PM   Dictation workstation:   [id number]      XR chest 1 view   Final Result   1. Hyperinflated lungs without acute abnormality                  MACRO:   None        Signed by: Rajesh Gill 7/10/2024 2:54 PM   Dictation workstation:   [id number]            ED Course & MDM     Medical Decision Making  This is a 58-year-old male with past medical history of hypertension, remote history of perforated gastric ulcer who presents to the ED with diffuse abdominal pain, nausea, vomiting, diarrhea, generalized malaise, syncopal episode as well as significant weight loss.  Vitals showed blood pressure 136/18.  Heart rate elevated at 107.  SpO2 was at 95% on 4 L.  On examination patient did look quite emaciated.  Dry mucous membranes.  Poor skin turgor.  Abdomen diffusely tender to palpation.  He does have a left inguinal hernia that is easily reducible.  IV established and patient started on IV fluids.  He was ordered morphine as well as Zofran for his symptoms.  Patient's VBG obtained which showed patient was alkalotic at 7.53, potassium low at 2.5.  Calcium low at 1.0.  Lactate elevated at 5.4.  Because of this patient was ordered both IV potassium and calcium replacement.  Additionally patient's EKG showed sinus tachycardia with prolonged QT at 468/636.  Patient was placed on cardiac monitoring.  CT PE study ordered for patient's hypoxia, CT abdomen pelvis ordered due to the patient's abdominal pain with nausea, vomiting, and diarrhea as well as CT head ordered for the patient's syncopal episode with LOC.  Patient was signed out to oncoming team pending his CT scan results as well as laboratory studies and reevaluation.    Amount and/or Complexity of Data Reviewed  Labs: ordered.  Radiology: ordered.  ECG/medicine tests:  ordered and independent interpretation performed.     Details: EKG sinus tachycardia at 111 bpm without any acute ST elevation or depression.  There is left axis deviation.  Prolonged QT at 468/636.         ED Course as of 07/13/24 0238   Wed Jul 10, 2024   1402 58-year-old male with previous hospitalization for stomatitis enterocolitis and had vocal kalemia requiring repletion presents with poor p.o. intake tachycardia and abdominal pain.  Patient was admitted at the end of June and early July where he had a CT of the abdomen pelvis that showed enterocolitis and esophagitis.  This is in the context of poor p.o. intake nausea vomiting diarrhea and weight loss.  Of note, patient had some esophageal thickening that would require EGD to rule out malignancy but he has been unable to get that evaluation.  Patient was admitted to the CDU and was able to tolerate p.o. with Peridex washes and his hypokalemia was repleted and resolved.  Patient states since he has been discharged he has had continuation of his symptoms with nausea vomiting diarrhea and poor p.o. intake.  States his dermatitis has returned.  He denies any falls any fever chills any chest pain.  Per EMS patient did have an oxygen requirement with hypoxemia to 85% on room air.  Patient has no known history of COPD that he reports.  Patient states acute on chronic abdominal pain patient denies any urinary symptoms.  Patient denies any lower extremity edema.  EKG is a sinus tachycardia with a prolonged QT of 600.  My presumption is this is secondary to electrolyte abnormalities.  Venous blood gas shows hypokalemia hypocalcemia hypochloremia and metabolic alkalosis, consistent with his poor p.o. intake and increased GI losses.  Will aggressively replete his calcium, potassium, potassium and rehydrate him with normal saline.  Patient will get CTs of the chest abdomen and pelvis given his new hypoxia and his acute on chronic pain.  Given his failure to thrive in the  outpatient environment and no obvious underlying diagnosis patient will need to be admitted to the hospital for electrolyte repletion, telemetry monitoring, workup of his chronic abdominal pain and GI symptoms [CM]   1554 Radiology called about the patient's CT imaging.  There is concern for exophytic mass within the stomach with potential etiology being GIST. [RS]   1604 POTASSIUM(!!): 2.1  Potassium noted to be 2.1 on the CMP.  Magnesium within normal limits.  Additional IV potassium replacement ordered at this time given the significant hypokalemia for a total of 80 mill equivalents including the prior order.  Will reassess potassium levels after completion of this. [RS]   1720 Patient continues to be nauseous and had an active episode of vomiting.  Patient continues to be hypertensive with blood pressures 170s/130s.  Will give a dose of labetalol and reassess.  Patient is also to receive additional IV fluids as he is continuously vomiting.  Given the patient's significantly prolonged QTC interval, will not be able to provide antiemetics at this time.  Patient updated with the finding from his CT imaging which was negative for PE or pneumonia but did show a concern for possible GIST in the gastric .... [RS]   1941 Repeat lactate noted to be 2.2.  Troponin is 16. [RS]   2158 Repeat potassium noted to be 2.3 with mild hemolysis after 60 mill equivalents of potassium given.  Additional 20 mill equivalents running now.  MICU attending paged for discussion on admission given the patient's resistant hypokalemia.  Patient accepted to the stepdown unit given his resistant hypokalemia.  Recommends ordering an additional 60 mEq IV potassium and starting this prior to transport up to the floor. [RS]      ED Course User Index  [CM] Frankie Pratt MD  [RS] Gonzalo Dodson, DO         Diagnoses as of 07/13/24 0238   Failure to thrive in adult   Hypokalemia   Hypocalcemia   Dehydration   Intractable nausea and vomiting        Procedures    Juliane Fernandez, ESTHER, SLOAN Fernandez PA-C  07/13/24 0239

## 2024-07-10 NOTE — ED TRIAGE NOTES
57 yo male arrived to the ED via EMS due to altered mental status, vomiting blood since last night and general weakness. EMS stated he was hypotensive and tachycardic upon arrival, o2 sat was 88% so they placed him on 4L NC. PT stated 8/10 cramping abdominal pain. He was in the CDU a few days ago for a hernia.

## 2024-07-11 ENCOUNTER — OFFICE VISIT (OUTPATIENT)
Dept: SURGICAL ONCOLOGY | Facility: HOSPITAL | Age: 59
End: 2024-07-11
Payer: COMMERCIAL

## 2024-07-11 ENCOUNTER — APPOINTMENT (OUTPATIENT)
Dept: CARDIOLOGY | Facility: HOSPITAL | Age: 59
End: 2024-07-11
Payer: COMMERCIAL

## 2024-07-11 ENCOUNTER — APPOINTMENT (OUTPATIENT)
Dept: RADIOLOGY | Facility: HOSPITAL | Age: 59
End: 2024-07-11
Payer: COMMERCIAL

## 2024-07-11 LAB
25(OH)D3 SERPL-MCNC: 6 NG/ML (ref 30–100)
ALBUMIN SERPL BCP-MCNC: 3.5 G/DL (ref 3.4–5)
ALBUMIN SERPL BCP-MCNC: 3.5 G/DL (ref 3.4–5)
ALBUMIN SERPL BCP-MCNC: 3.6 G/DL (ref 3.4–5)
ANION GAP SERPL CALC-SCNC: 15 MMOL/L (ref 10–20)
ANION GAP SERPL CALC-SCNC: 16 MMOL/L (ref 10–20)
ANION GAP SERPL CALC-SCNC: 9 MMOL/L (ref 10–20)
AORTIC VALVE MEAN GRADIENT: 2.6 MMHG
AORTIC VALVE PEAK VELOCITY: 1.13 M/S
ATRIAL RATE: 111 BPM
ATRIAL RATE: 83 BPM
AV PEAK GRADIENT: 5.1 MMHG
AVA (PEAK VEL): 2.37 CM2
AVA (VTI): 2.41 CM2
BUN SERPL-MCNC: 19 MG/DL (ref 6–23)
BUN SERPL-MCNC: 22 MG/DL (ref 6–23)
BUN SERPL-MCNC: 23 MG/DL (ref 6–23)
CALCIUM SERPL-MCNC: 8.5 MG/DL (ref 8.6–10.6)
CALCIUM SERPL-MCNC: 8.5 MG/DL (ref 8.6–10.6)
CALCIUM SERPL-MCNC: 8.7 MG/DL (ref 8.6–10.6)
CANCER AG19-9 SERPL-ACNC: 24.77 U/ML
CHLORIDE SERPL-SCNC: 101 MMOL/L (ref 98–107)
CHLORIDE SERPL-SCNC: 102 MMOL/L (ref 98–107)
CHLORIDE SERPL-SCNC: 99 MMOL/L (ref 98–107)
CO2 SERPL-SCNC: 27 MMOL/L (ref 21–32)
CO2 SERPL-SCNC: 28 MMOL/L (ref 21–32)
CO2 SERPL-SCNC: 31 MMOL/L (ref 21–32)
CREAT SERPL-MCNC: 1.1 MG/DL (ref 0.5–1.3)
CREAT SERPL-MCNC: 1.3 MG/DL (ref 0.5–1.3)
CREAT SERPL-MCNC: 1.31 MG/DL (ref 0.5–1.3)
EGFRCR SERPLBLD CKD-EPI 2021: 62 ML/MIN/1.73M*2
EGFRCR SERPLBLD CKD-EPI 2021: 63 ML/MIN/1.73M*2
EGFRCR SERPLBLD CKD-EPI 2021: 77 ML/MIN/1.73M*2
EJECTION FRACTION APICAL 4 CHAMBER: 59.6
EJECTION FRACTION: 61 %
ERYTHROCYTE [DISTWIDTH] IN BLOOD BY AUTOMATED COUNT: 12.3 % (ref 11.5–14.5)
EST. AVERAGE GLUCOSE BLD GHB EST-MCNC: 120 MG/DL
FOLATE SERPL-MCNC: 8.1 NG/ML
GLUCOSE SERPL-MCNC: 113 MG/DL (ref 74–99)
GLUCOSE SERPL-MCNC: 124 MG/DL (ref 74–99)
GLUCOSE SERPL-MCNC: 130 MG/DL (ref 74–99)
HBA1C MFR BLD: 5.8 %
HCT VFR BLD AUTO: 37 % (ref 41–52)
HGB BLD-MCNC: 12.5 G/DL (ref 13.5–17.5)
LACTATE SERPL-SCNC: 1.8 MMOL/L (ref 0.4–2)
LACTATE SERPL-SCNC: 2 MMOL/L (ref 0.4–2)
LEFT ATRIUM VOLUME AREA LENGTH INDEX BSA: 16.2 ML/M2
LEFT VENTRICLE INTERNAL DIMENSION DIASTOLE: 3.3 CM (ref 3.5–6)
LEFT VENTRICULAR OUTFLOW TRACT DIAMETER: 1.95 CM
MAGNESIUM SERPL-MCNC: 1.78 MG/DL (ref 1.6–2.4)
MAGNESIUM SERPL-MCNC: 2.27 MG/DL (ref 1.6–2.4)
MCH RBC QN AUTO: 32.2 PG (ref 26–34)
MCHC RBC AUTO-ENTMCNC: 33.8 G/DL (ref 32–36)
MCV RBC AUTO: 95 FL (ref 80–100)
MITRAL VALVE E/A RATIO: 0.47
NRBC BLD-RTO: 0 /100 WBCS (ref 0–0)
P AXIS: 70 DEGREES
P OFFSET: 185 MS
P ONSET: 140 MS
PHOSPHATE SERPL-MCNC: 1.6 MG/DL (ref 2.5–4.9)
PHOSPHATE SERPL-MCNC: 2.8 MG/DL (ref 2.5–4.9)
PHOSPHATE SERPL-MCNC: 4.4 MG/DL (ref 2.5–4.9)
PLATELET # BLD AUTO: 367 X10*3/UL (ref 150–450)
POTASSIUM SERPL-SCNC: 3.4 MMOL/L (ref 3.5–5.3)
POTASSIUM SERPL-SCNC: 3.4 MMOL/L (ref 3.5–5.3)
POTASSIUM SERPL-SCNC: 3.5 MMOL/L (ref 3.5–5.3)
PR INTERVAL: 120 MS
PR INTERVAL: 160 MS
Q ONSET: 220 MS
Q ONSET: 223 MS
QRS COUNT: 14 BEATS
QRS COUNT: 18 BEATS
QRS DURATION: 86 MS
QRS DURATION: 90 MS
QT INTERVAL: 438 MS
QT INTERVAL: 468 MS
QTC CALCULATION(BAZETT): 514 MS
QTC CALCULATION(BAZETT): 636 MS
QTC FREDERICIA: 488 MS
QTC FREDERICIA: 574 MS
R AXIS: -37 DEGREES
R AXIS: -77 DEGREES
RBC # BLD AUTO: 3.88 X10*6/UL (ref 4.5–5.9)
RIGHT VENTRICLE FREE WALL PEAK S': 12 CM/S
RIGHT VENTRICLE PEAK SYSTOLIC PRESSURE: 25.5 MMHG
SODIUM SERPL-SCNC: 138 MMOL/L (ref 136–145)
SODIUM SERPL-SCNC: 140 MMOL/L (ref 136–145)
SODIUM SERPL-SCNC: 140 MMOL/L (ref 136–145)
T AXIS: 69 DEGREES
T AXIS: 74 DEGREES
T OFFSET: 439 MS
T OFFSET: 457 MS
TRICUSPID ANNULAR PLANE SYSTOLIC EXCURSION: 2.1 CM
TSH SERPL-ACNC: 1.13 MIU/L (ref 0.44–3.98)
VENTRICULAR RATE: 111 BPM
VENTRICULAR RATE: 83 BPM
VIT B12 SERPL-MCNC: 282 PG/ML (ref 211–911)
WBC # BLD AUTO: 6.2 X10*3/UL (ref 4.4–11.3)

## 2024-07-11 PROCEDURE — 82378 CARCINOEMBRYONIC ANTIGEN: CPT

## 2024-07-11 PROCEDURE — 82306 VITAMIN D 25 HYDROXY: CPT | Performed by: NURSE PRACTITIONER

## 2024-07-11 PROCEDURE — 2500000001 HC RX 250 WO HCPCS SELF ADMINISTERED DRUGS (ALT 637 FOR MEDICARE OP): Performed by: NURSE PRACTITIONER

## 2024-07-11 PROCEDURE — 83735 ASSAY OF MAGNESIUM: CPT | Performed by: NURSE PRACTITIONER

## 2024-07-11 PROCEDURE — 93010 ELECTROCARDIOGRAM REPORT: CPT | Performed by: INTERNAL MEDICINE

## 2024-07-11 PROCEDURE — 82607 VITAMIN B-12: CPT | Performed by: NURSE PRACTITIONER

## 2024-07-11 PROCEDURE — 2060000001 HC INTERMEDIATE ICU ROOM DAILY

## 2024-07-11 PROCEDURE — 86301 IMMUNOASSAY TUMOR CA 19-9: CPT | Performed by: NURSE PRACTITIONER

## 2024-07-11 PROCEDURE — 2500000005 HC RX 250 GENERAL PHARMACY W/O HCPCS: Performed by: NURSE PRACTITIONER

## 2024-07-11 PROCEDURE — 93005 ELECTROCARDIOGRAM TRACING: CPT

## 2024-07-11 PROCEDURE — 70355 PANORAMIC X-RAY OF JAWS: CPT

## 2024-07-11 PROCEDURE — 82746 ASSAY OF FOLIC ACID SERUM: CPT | Performed by: NURSE PRACTITIONER

## 2024-07-11 PROCEDURE — 2500000004 HC RX 250 GENERAL PHARMACY W/ HCPCS (ALT 636 FOR OP/ED): Performed by: NURSE PRACTITIONER

## 2024-07-11 PROCEDURE — 99233 SBSQ HOSP IP/OBS HIGH 50: CPT | Performed by: INTERNAL MEDICINE

## 2024-07-11 PROCEDURE — 84100 ASSAY OF PHOSPHORUS: CPT | Performed by: NURSE PRACTITIONER

## 2024-07-11 PROCEDURE — 83605 ASSAY OF LACTIC ACID: CPT | Performed by: NURSE PRACTITIONER

## 2024-07-11 PROCEDURE — 93306 TTE W/DOPPLER COMPLETE: CPT

## 2024-07-11 PROCEDURE — 36415 COLL VENOUS BLD VENIPUNCTURE: CPT | Performed by: NURSE PRACTITIONER

## 2024-07-11 PROCEDURE — 93306 TTE W/DOPPLER COMPLETE: CPT | Performed by: STUDENT IN AN ORGANIZED HEALTH CARE EDUCATION/TRAINING PROGRAM

## 2024-07-11 PROCEDURE — 99222 1ST HOSP IP/OBS MODERATE 55: CPT

## 2024-07-11 PROCEDURE — 2500000004 HC RX 250 GENERAL PHARMACY W/ HCPCS (ALT 636 FOR OP/ED)

## 2024-07-11 PROCEDURE — C9113 INJ PANTOPRAZOLE SODIUM, VIA: HCPCS | Performed by: NURSE PRACTITIONER

## 2024-07-11 PROCEDURE — 85027 COMPLETE CBC AUTOMATED: CPT | Performed by: NURSE PRACTITIONER

## 2024-07-11 PROCEDURE — 80069 RENAL FUNCTION PANEL: CPT | Performed by: NURSE PRACTITIONER

## 2024-07-11 PROCEDURE — 70355 PANORAMIC X-RAY OF JAWS: CPT | Performed by: RADIOLOGY

## 2024-07-11 PROCEDURE — 84443 ASSAY THYROID STIM HORMONE: CPT | Performed by: NURSE PRACTITIONER

## 2024-07-11 RX ORDER — ONDANSETRON HYDROCHLORIDE 2 MG/ML
4 INJECTION, SOLUTION INTRAVENOUS EVERY 4 HOURS PRN
Status: COMPLETED | OUTPATIENT
Start: 2024-07-11 | End: 2024-07-11

## 2024-07-11 RX ORDER — AMLODIPINE BESYLATE 10 MG/1
10 TABLET ORAL DAILY
Status: DISCONTINUED | OUTPATIENT
Start: 2024-07-11 | End: 2024-07-16 | Stop reason: HOSPADM

## 2024-07-11 RX ORDER — FOLIC ACID 1 MG/1
1 TABLET ORAL DAILY
Status: DISCONTINUED | OUTPATIENT
Start: 2024-07-11 | End: 2024-07-16 | Stop reason: HOSPADM

## 2024-07-11 RX ORDER — THIAMINE HYDROCHLORIDE 100 MG/ML
100 INJECTION, SOLUTION INTRAMUSCULAR; INTRAVENOUS DAILY
Status: DISCONTINUED | OUTPATIENT
Start: 2024-07-12 | End: 2024-07-16 | Stop reason: HOSPADM

## 2024-07-11 RX ORDER — HYDROCHLOROTHIAZIDE 25 MG/1
12.5 TABLET ORAL DAILY
Status: DISCONTINUED | OUTPATIENT
Start: 2024-07-11 | End: 2024-07-11

## 2024-07-11 RX ORDER — TRAMADOL HYDROCHLORIDE 50 MG/1
50 TABLET ORAL EVERY 6 HOURS PRN
Status: DISCONTINUED | OUTPATIENT
Start: 2024-07-11 | End: 2024-07-14

## 2024-07-11 RX ORDER — POTASSIUM CHLORIDE 14.9 MG/ML
20 INJECTION INTRAVENOUS
Status: COMPLETED | OUTPATIENT
Start: 2024-07-11 | End: 2024-07-11

## 2024-07-11 RX ORDER — ERGOCALCIFEROL 1.25 MG/1
50000 CAPSULE ORAL
Status: DISCONTINUED | OUTPATIENT
Start: 2024-07-14 | End: 2024-07-16 | Stop reason: HOSPADM

## 2024-07-11 RX ORDER — HEPARIN SODIUM 5000 [USP'U]/ML
5000 INJECTION, SOLUTION INTRAVENOUS; SUBCUTANEOUS EVERY 8 HOURS SCHEDULED
Status: DISCONTINUED | OUTPATIENT
Start: 2024-07-11 | End: 2024-07-13

## 2024-07-11 RX ORDER — DEXTROSE MONOHYDRATE, SODIUM CHLORIDE, AND POTASSIUM CHLORIDE 50; 1.49; 4.5 G/1000ML; G/1000ML; G/1000ML
100 INJECTION, SOLUTION INTRAVENOUS CONTINUOUS
Status: DISCONTINUED | OUTPATIENT
Start: 2024-07-11 | End: 2024-07-11

## 2024-07-11 RX ORDER — MAGNESIUM SULFATE HEPTAHYDRATE 40 MG/ML
2 INJECTION, SOLUTION INTRAVENOUS ONCE
Status: COMPLETED | OUTPATIENT
Start: 2024-07-11 | End: 2024-07-11

## 2024-07-11 RX ORDER — DEXTROSE MONOHYDRATE AND SODIUM CHLORIDE 5; .9 G/100ML; G/100ML
50 INJECTION, SOLUTION INTRAVENOUS CONTINUOUS
Status: DISCONTINUED | OUTPATIENT
Start: 2024-07-11 | End: 2024-07-13

## 2024-07-11 RX ORDER — DEXTROSE MONOHYDRATE, SODIUM CHLORIDE, AND POTASSIUM CHLORIDE 50; 1.49; 4.5 G/1000ML; G/1000ML; G/1000ML
100 INJECTION, SOLUTION INTRAVENOUS CONTINUOUS
Status: CANCELLED | OUTPATIENT
Start: 2024-07-11 | End: 2024-07-11

## 2024-07-11 RX ORDER — PANTOPRAZOLE SODIUM 40 MG/10ML
40 INJECTION, POWDER, LYOPHILIZED, FOR SOLUTION INTRAVENOUS 2 TIMES DAILY
Status: DISCONTINUED | OUTPATIENT
Start: 2024-07-11 | End: 2024-07-13

## 2024-07-11 RX ORDER — NAPROXEN SODIUM 220 MG/1
81 TABLET, FILM COATED ORAL DAILY
Status: DISCONTINUED | OUTPATIENT
Start: 2024-07-11 | End: 2024-07-11

## 2024-07-11 RX ORDER — THIAMINE HYDROCHLORIDE 100 MG/ML
100 INJECTION, SOLUTION INTRAMUSCULAR; INTRAVENOUS ONCE
Status: COMPLETED | OUTPATIENT
Start: 2024-07-11 | End: 2024-07-11

## 2024-07-11 RX ORDER — ONDANSETRON HYDROCHLORIDE 2 MG/ML
4 INJECTION, SOLUTION INTRAVENOUS EVERY 4 HOURS PRN
Status: DISCONTINUED | OUTPATIENT
Start: 2024-07-11 | End: 2024-07-16 | Stop reason: HOSPADM

## 2024-07-11 ASSESSMENT — PAIN - FUNCTIONAL ASSESSMENT
PAIN_FUNCTIONAL_ASSESSMENT: 0-10
PAIN_FUNCTIONAL_ASSESSMENT: UNABLE TO SELF-REPORT
PAIN_FUNCTIONAL_ASSESSMENT: 0-10
PAIN_FUNCTIONAL_ASSESSMENT: 0-10
PAIN_FUNCTIONAL_ASSESSMENT: UNABLE TO SELF-REPORT
PAIN_FUNCTIONAL_ASSESSMENT: 0-10

## 2024-07-11 ASSESSMENT — COGNITIVE AND FUNCTIONAL STATUS - GENERAL
DRESSING REGULAR UPPER BODY CLOTHING: A LITTLE
MOVING TO AND FROM BED TO CHAIR: A LITTLE
DAILY ACTIVITIY SCORE: 18
TOILETING: A LITTLE
STANDING UP FROM CHAIR USING ARMS: A LITTLE
DRESSING REGULAR UPPER BODY CLOTHING: A LITTLE
TOILETING: A LITTLE
PERSONAL GROOMING: A LITTLE
WALKING IN HOSPITAL ROOM: A LITTLE
EATING MEALS: A LITTLE
STANDING UP FROM CHAIR USING ARMS: A LITTLE
CLIMB 3 TO 5 STEPS WITH RAILING: A LITTLE
PERSONAL GROOMING: A LITTLE
HELP NEEDED FOR BATHING: A LITTLE
DRESSING REGULAR LOWER BODY CLOTHING: A LITTLE
MOVING TO AND FROM BED TO CHAIR: A LITTLE
WALKING IN HOSPITAL ROOM: A LITTLE
DAILY ACTIVITIY SCORE: 18
DRESSING REGULAR LOWER BODY CLOTHING: A LITTLE
HELP NEEDED FOR BATHING: A LITTLE
EATING MEALS: A LITTLE
MOBILITY SCORE: 20
MOBILITY SCORE: 20
CLIMB 3 TO 5 STEPS WITH RAILING: A LITTLE

## 2024-07-11 ASSESSMENT — LIFESTYLE VARIABLES
HOW OFTEN DO YOU HAVE 6 OR MORE DRINKS ON ONE OCCASION: NEVER
AUDIT-C TOTAL SCORE: 3
HOW OFTEN DO YOU HAVE A DRINK CONTAINING ALCOHOL: 2-3 TIMES A WEEK
SKIP TO QUESTIONS 9-10: 1
HOW MANY STANDARD DRINKS CONTAINING ALCOHOL DO YOU HAVE ON A TYPICAL DAY: 1 OR 2
AUDIT-C TOTAL SCORE: 3

## 2024-07-11 ASSESSMENT — PAIN SCALES - GENERAL
PAINLEVEL_OUTOF10: 9
PAINLEVEL_OUTOF10: 3
PAINLEVEL_OUTOF10: 9
PAINLEVEL_OUTOF10: 9
PAINLEVEL_OUTOF10: 4
PAINLEVEL_OUTOF10: 9

## 2024-07-11 ASSESSMENT — PAIN DESCRIPTION - LOCATION
LOCATION: ABDOMEN
LOCATION: ABDOMEN
LOCATION: HEAD

## 2024-07-11 NOTE — PROGRESS NOTES
Emergency Department Transition of Care Note       Signout   I received Joe Alaniz in signout from Juliane Fernandez PA-C.  Please see the ED Provider Note for all HPI, PE and MDM up to the time of signout at 1500.  This is in addition to the primary record.    In brief Joe Alaniz is an 58 y.o. male presenting for persistent N/V/D over the past 2-3 weeks with significant weight loss associated.  The patient was found to be profoundly hypokalemic as well as hypocalcemic.  At the time of signout we are awaiting CT imaging of the abdomen and pelvis as well as labs and appropriate electrolyte replacement.    ED Course & Medical Decision Making   Medical Decision Making:  Under my care, patient was immediately evaluated after signout and appeared to be resting comfortably in bed with no active vomiting and in no acute distress.  See ED course below for further evaluation/workup.    ED Course:  ED Course as of 07/11/24 0138   Wed Jul 10, 2024   1402 58-year-old male with previous hospitalization for stomatitis enterocolitis and had vocal kalemia requiring repletion presents with poor p.o. intake tachycardia and abdominal pain.  Patient was admitted at the end of June and early July where he had a CT of the abdomen pelvis that showed enterocolitis and esophagitis.  This is in the context of poor p.o. intake nausea vomiting diarrhea and weight loss.  Of note, patient had some esophageal thickening that would require EGD to rule out malignancy but he has been unable to get that evaluation.  Patient was admitted to the CDU and was able to tolerate p.o. with Peridex washes and his hypokalemia was repleted and resolved.  Patient states since he has been discharged he has had continuation of his symptoms with nausea vomiting diarrhea and poor p.o. intake.  States his dermatitis has returned.  He denies any falls any fever chills any chest pain.  Per EMS patient did have an oxygen requirement with hypoxemia to 85% on room air.   Patient has no known history of COPD that he reports.  Patient states acute on chronic abdominal pain patient denies any urinary symptoms.  Patient denies any lower extremity edema.  EKG is a sinus tachycardia with a prolonged QT of 600.  My presumption is this is secondary to electrolyte abnormalities.  Venous blood gas shows hypokalemia hypocalcemia hypochloremia and metabolic alkalosis, consistent with his poor p.o. intake and increased GI losses.  Will aggressively replete his calcium, potassium, potassium and rehydrate him with normal saline.  Patient will get CTs of the chest abdomen and pelvis given his new hypoxia and his acute on chronic pain.  Given his failure to thrive in the outpatient environment and no obvious underlying diagnosis patient will need to be admitted to the hospital for electrolyte repletion, telemetry monitoring, workup of his chronic abdominal pain and GI symptoms [CM]   1554 Radiology called about the patient's CT imaging.  There is concern for exophytic mass within the stomach with potential etiology being GIST. [RS]   1604 POTASSIUM(!!): 2.1  Potassium noted to be 2.1 on the CMP.  Magnesium within normal limits.  Additional IV potassium replacement ordered at this time given the significant hypokalemia for a total of 80 mill equivalents including the prior order.  Will reassess potassium levels after completion of this. [RS]   1720 Patient continues to be nauseous and had an active episode of vomiting.  Patient continues to be hypertensive with blood pressures 170s/130s.  Will give a dose of labetalol and reassess.  Patient is also to receive additional IV fluids as he is continuously vomiting.  Given the patient's significantly prolonged QTC interval, will not be able to provide antiemetics at this time.  Patient updated with the finding from his CT imaging which was negative for PE or pneumonia but did show a concern for possible GIST in the gastric .... [RS]   1941 Repeat lactate  noted to be 2.2.  Troponin is 16. [RS]   2150 Repeat potassium noted to be 2.3 with mild hemolysis after 60 mill equivalents of potassium given.  Additional 20 mill equivalents running now.  MIC[facility] attending paged for discussion on admission given the patient's resistant hypokalemia.  Patient accepted to the stepdown unit given his resistant hypokalemia.  Recommends ordering an additional 60 mEq IV potassium and starting this prior to transport up to the floor. [RS]      ED Course User Index  [CM] Frankie Pratt MD  [RS] Gonzalo Dodson DO         Diagnoses as of 07/11/24 0138   Failure to thrive in adult   Hypokalemia   Hypocalcemia   Dehydration   Intractable nausea and vomiting       Disposition   As a result of their workup, the patient will require admission to the hospital.  The patient was informed of his diagnosis.  The patient was given the opportunity to ask questions and I answered them. The patient agreed to be admitted to the hospital.    Procedures   Procedures    Patient seen and discussed with ED attending physician.    Gonzalo Dodson DO  Emergency Medicine

## 2024-07-11 NOTE — PROGRESS NOTES
Pharmacy Medication History Review    Joe Alaniz is a 58 y.o. male admitted for Failure to thrive in adult. Pharmacy reviewed the patient's [id number] medications and allergies for accuracy.    The list below reflects the updated PTA list. Comments regarding how patient may be taking medications differently can be found in the Admit Orders Activity  Prior to Admission Medications   Prescriptions Informant Patient/Chart  Reported?   acetaminophen (Tylenol) 500 mg tablet Self PRN   Sig: Take 2 tablets (1,000 mg) by mouth every 6 hours if needed  for pain   amLODIPine (Norvasc) 10 mg tablet Self See Comment   Sig: Take 1 tablet (10 mg) by mouth once daily.   amoxicillin (Amoxil) 500 mg tablet Self Yes   Sig: Take 1 tablet (500 mg) by mouth every 8 hours.   Until finished   aspirin 81 mg chewable tablet Self Yes   Sig: Chew 1 tablet (81 mg) once daily.   benzocaine-menthol (Cepastat Sore Throat) lozenge Self PRN   Sig: Dissolve 1 lozenge in the mouth every 2 hours if needed  for sore throat.   chlorhexidine (Peridex) 0.12 % solution Self PRN   Sig: Use 15 mL in the mouth or throat if needed for wound care.   diphenhydramine/Maalox/lidocaine (Magic Mouthwash) - Compounded - Outpatient Self PRN   Sig: Swish and spit every 4-6 hours as needed for mouth sores.   hydroCHLOROthiazide (HYDRODiuril) 25 mg tablet Self See Comment   Sig: Take 0.5 tablets (12.5 mg) by mouth once daily.   ibuprofen 600 mg tablet Self PRN   Sig: Take 1 tablet (600 mg) by mouth every 8 hours if needed   (pain).      psyllium (Metamucil) 3.4 gram packet Self PRN   Sig: Take 1 packet by mouth if needed. Mix and drink with at   least 8 ounces of water or juice.      Facility-Administered Medications: None        The list below reflects the updated allergy list. Please review each documented allergy for additional clarification and justification.  Allergies  Reviewed by Gael Carmona on 7/10/2024        Severity Reactions Comments     Erythromycin Not Specified Hives             Patient accepts M2B at discharge. Pharmacy has been updated to [facility].    Sources used to complete the med history include:  Patient interviewed about medications, alert, cooperative, pleasant, knew his medications and doses  Epic Dispense Report reviewed  [facility]   7/4/24  ED Discharge Summary & AVS reviewed    Below are additional concerns with the patient's PTA list.  **Patient has a history of amlodipine 10 mg tablet daily & hydrochlorothiazide 25 mg tablet (1/2 tablet) daily (LF 5/8/24, 30 DS, #30) each, patient states he still takes these medications**    ---------------------------------  Gael Carmona CPhT  Transitions of Care Technician  Medication reconciliation complete  Please reach out via [facility] Secure Chat for questions,   or if no response call [facility] or [facility].   [facility]

## 2024-07-11 NOTE — CONSULTS
[facility]   [facility]  INITIAL CONSULT NOTE   Reason For Consult  New mass as seen on CT concerning for GIST    SUBJECTIVE     History Of Present Illness  Joe Alaniz is a 58 y.o. male with a past medical history of HTN, BPH, PUD s/p surgery as a teenager, nephrolithiasis, left inguinal hernia admitted on 7/10/2024 with nausea, vomiting, and an episode of diarrhea.    Patient reports that he has been vomiting approximately 3x per day since 05/2024. States that he presented to the ED yesterday because after vomiting he fell/passed out and does not recall hitting his head but he does recall his brother being at his side, unsure if he was unconscious. Reports that he is continuously nauseous and then vomits, mostly food colored but yesterday had approximately 1 teaspoon of blood in his vomit followed by coffee ground emesis. Reports that this has not happened prior. With this, he endorses regurgitation of solid foods. Reports that he has not been able to hold down solids since May but is able to swallow and keep liquids down. Reports that the food feels like it is getting stuck in his esophagus. Reports reflux symptoms. Denies any abdominal pain besides left sided hernia tenderness that is also constant. Additionally, endorses a 50 lbs weight loss since May. Reports he does have an appetite but is just unable to hold much down. Last BM yesterday that was diarrhea, without melena or hematochezia. Reports using NSAIDs- Motrin 600 mg daily for the past month but previously was taking Meloxicam. Has been taking Rolaids for his symptoms without any improvement. Reports that he has a history of PUD, diagnosed at 11 years old. States he had an infection, H. Pylori sounds vaguely familiar to him, and he had large volume hematemesis and underwent an open surgical procedure that he is unsure what this was. Reports that prior to May he drank approximately 1 glass of jerod  per day and would finish 1 bottle per week. Reports quitting using tobacco around this time as well, previously smoked 3-4 cigarettes per day for 20+ years. Smokes 1 joint of marijuana every few days to help with his appetite. Per patient, he has never undergone a colonoscopy before but has undergone an EGD when he was a teenager for the ulceration as above.    Of note, patient presented to the ED 07/02-07/04 for mouth sores. Patient was found to be hypokalemic to 2.8. Patient's potassium was repleted and was given medications for pain.    On admission, patient was hypertensive to 180s/110s for which he received PRN pushes of hydralazine with improvement- per patient he missed his AM doses of anti-HTN medications.  Additionally, the patient was found to be hypoxic to 88% on RA. CXR negative for acute findings, CTPE negative for PE. Labs were significant for CBC: Hgb 12.5 (BL ~15 but Hgb 13.2 early July); RFP: K+ 2.1 Cr 1.63 (BL ~.60) for which he received IV repletion; LFTs bili 1.4; lactate 4.3 for which he received 2L with resolution to 1.8. Underwent CT head due to history of the syncopal episode, CT head was unremarkable for anything acute. CT abdomen/pelvis demonstrated a 2.0 x 1.5 cm exophytic mass at the posterior wall of the gastric fundus concerning for GIST, circumferential mural thickening of the distal esophagus, sigmoid diverticulosis, and a left inguinal hernia. Of note, patient underwent a CT ab/pelvis 07/02 which did not mention this mass but gastric and small bowel/large bowel hyperemia and esophagitis, though present on review. GI consulted for this new exophytic mass noted on CT.      Review of Systems  Negative except for above       Past Medical History:  History reviewed. No pertinent past medical history.    Home Medications  Medications Prior to Admission   Medication Sig Dispense Refill Last Dose    acetaminophen (Tylenol) 500 mg tablet Take 2 tablets (1,000 mg) by mouth every 6 hours if  needed for mild pain (1 - 3) for up to 10 days. 30 tablet 0 Past Week    amLODIPine (Norvasc) 10 mg tablet Take 1 tablet (10 mg) by mouth once daily. 30 tablet 0     [] aspirin 81 mg chewable tablet Chew 1 tablet (81 mg) once daily. 60 tablet 0     diphenhydramine/Maalox/lidocaine (Magic Mouthwash) - Compounded - Outpatient Swish and spit every 4-6 hours as needed for mouth sores. 120 mL 0 Unknown    hydroCHLOROthiazide (HYDRODiuril) 25 mg tablet Take 0.5 tablets (12.5 mg) by mouth once daily. 15 tablet 0     [] ondansetron (Zofran) 4 mg tablet Take 1 tablet (4 mg) by mouth every 6 hours for 3 days. (Patient not taking: Reported on 7/10/2024) 12 tablet 0 Not Taking    psyllium (Metamucil) 3.4 gram packet Take 1 packet by mouth 2 times a day. Mix and drink with at least 8 ounces of water or juice. (Patient taking differently: Take 1 packet by mouth if needed. Mix and drink with at least 8 ounces of water or juice.) 60 packet 0 Unknown    [] traMADol (Ultram) 50 mg tablet Take 1 tablet (50 mg) by mouth every 4 hours if needed for severe pain (7 - 10) for up to 3 days. (Patient not taking: Reported on 7/10/2024) 10 tablet 0 Not Taking       Surgical History:  History reviewed. No pertinent surgical history.    Allergies:    Allergies   Allergen Reactions    Erythromycin Hives       Social History:    Social History     Socioeconomic History    Marital status: Single     Spouse name: Not on file    Number of children: Not on file    Years of education: Not on file    Highest education level: Not on file   Occupational History    Not on file   Tobacco Use    Smoking status: Former     Types: Cigarettes    Smokeless tobacco: Former   Vaping Use    Vaping status: Not on file   Substance and Sexual Activity    Alcohol use: Not on file    Drug use: Not on file    Sexual activity: Not on file   Other Topics Concern    Not on file   Social History Narrative    Not on file     Social Determinants of Health      Financial Resource Strain: Not on file   Food Insecurity: Not on file   Transportation Needs: Not on file   Physical Activity: Not on file   Stress: Not on file   Social Connections: Not on file   Intimate Partner Violence: Not on file   Housing Stability: Not on file       Family History:  No family history on file.    EXAM   Vitals:  Vitals:    07/11/24 0843   BP:    Pulse:    Resp:    Temp: 36.4 °C (97.5 °F)   SpO2:        I/O last 3 completed shifts:  In: 224.2 (5 mL/kg) [I.V.:87.5 (2 mL/kg); IV Piggyback:136.7]  Out: 230 (5.2 mL/kg) [Urine:230 (0.1 mL/kg/hr)]  Weight: 44.5 kg   I/O this shift:  In: 752 [IV Piggyback:752]  Out: -       Physical Exam  General: cachectic, no acute distress  HEENT: PERRLA, EOM intact, no scleral icterus, moist MM  Respiratory: CTA bilaterally, normal work of breathing  Cardiovascular: RRR, no murmurs/rubs/gallops  Abdomen: Soft, nontender, nondistended, bowel sounds present. Small left inguinal hernia present that is tender, able to be reduced  Extremities: no edema  Neuro: alert and oriented, CNII-XII grossly intact, moves all 4 extremities with no focal deficits    OBJECTIVE                                                                            Medications   Medications:  folic acid, 1 mg, oral, Daily  [Held by provider] heparin (porcine), 5,000 Units, subcutaneous, q8h BEE  pantoprazole, 40 mg, intravenous, BID  [Held by provider] polyethylene glycol, 17 g, oral, Daily  potassium chloride, 20 mEq, intravenous, q2h  [START ON 7/12/2024] thiamine, 100 mg, intravenous, Daily      potassium hmeytlg-N5-9.45%NaCl, 100 mL/hr, Last Rate: 100 mL/hr (07/11/24 0507)      PRN medications: acetaminophen, hydrALAZINE, lidocaine-diphenhydraMINE-Maalox 1:1:1                                                                           Labs   Labs:  Most recent labs:   Results from last 7 days   Lab Units 07/11/24  0623 07/10/24  1400   WBC AUTO x10*3/uL 6.2 7.1   HEMOGLOBIN g/dL 12.5* 15.8  "  HEMATOCRIT % 37.0* 43.6   PLATELETS AUTO x10*3/uL 367 429     Results from last 7 days   Lab Units 07/11/24  0623 07/11/24  0007 07/10/24  1400   CO2 mmol/L 27 28 30   ANION GAP mmol/L 15 16 23   BUN mg/dL 22 23 25*   CREATININE mg/dL 1.30 1.31* 1.63*   GLUCOSE mg/dL 130* 113* 153*   CALCIUM mg/dL 8.7 8.5* 9.2   MAGNESIUM mg/dL  --  1.78 1.91   PHOSPHORUS mg/dL 2.8 4.4 3.7      Results from last 7 days   Lab Units 07/10/24  1400   ALT U/L 28   AST U/L 28   ALK PHOS U/L 67            No lab exists for component: \"PT\"      Results from last 7 days   Lab Units 07/10/24  1359   POCT PH, VENOUS pH 7.53*   POCT PCO2, VENOUS mm Hg 42     Results from last 7 days   Lab Units 07/11/24  0624 07/11/24  0007 07/10/24  1750   LACTATE mmol/L 1.8 2.0 2.2*                                                                                  Imaging   Imaging:  All images:   ECG 12 lead    Result Date: 7/11/2024  Sinus tachycardia Left axis deviation Inferior infarct , age undetermined Prolonged QT Abnormal ECG When compared with ECG of 03-JUL-2024 05:35, Significant changes have occurred See ED provider note for full interpretation and clinical correlation Confirmed by Sonja Nair ([id number]) on 7/11/2024 5:55:45 AM    ECG 12 lead    Result Date: 7/11/2024  Normal sinus rhythm Left axis deviation Prolonged QT Abnormal ECG See ED provider note for full interpretation and clinical correlation Confirmed by Sonja Nair ([id number]) on 7/11/2024 5:35:16 AM    CT abdomen pelvis w IV contrast    Result Date: 7/10/2024  Interpreted By:  Dilshad Alvarado and Stevens Alex STUDY: CT ABDOMEN PELVIS W IV CONTRAST;  7/10/2024 2:54 pm   INDICATION: Signs/Symptoms:abd pain, hx of perforated ulcer. n/v/d. Per EMR, 58-year-old male with past medical history of hypertension BPH as well as remote history of perforated gastric ulcer presents to the ED with nausea vomiting diarrhea diffuse abdominal pain as well as generalized malaise. States he has been " having these symptoms are proximally 2 weeks. Endorses having significant weight loss of approximately 45 pounds for the past 2-3 weeks. He states that the no small amount of bright red blood in emesis.   COMPARISON: CT abdomen and pelvis 07/02/2024.   ACCESSION NUMBER(S): [id number]   ORDERING CLINICIAN: MARCIA FALCON   TECHNIQUE: CT of the abdomen and pelvis was performed.  Standard contiguous axial images were obtained at 3 mm slice thickness through the abdomen and pelvis. Coronal and sagittal reconstructions at 3 mm slice thickness were performed.   80 ml of contrast Omnipaque 350 were administered intravenously without immediate complication.   FINDINGS: LOWER CHEST: Mild central lobar emphysema. The heart is normal in size without pericardial effusion. No pleural effusion is present. Redemonstration of circumferential mural thickening of the distal esophagus with submucosal edema.   ABDOMEN:   LIVER: Liver is diffusely enlarged with fatty infiltration. No focal lesions.   BILE DUCTS: The intrahepatic and extrahepatic ducts are not dilated.   GALLBLADDER: No calcified stones. No wall thickening.   PANCREAS: The pancreas appears unremarkable.   SPLEEN: Within normal limits.   ADRENAL GLANDS: Bilateral adrenal glands appear normal.   KIDNEYS AND URETERS: Re-demonstration of small hypodense bilateral cortical cysts. Kidneys are normal in size enhance symmetrically. No hydroureteronephrosis or nephroureterolithiasis is identified.   PELVIS:   BLADDER: The urinary bladder is decompressed, limited for evaluation.   REPRODUCTIVE ORGANS: The prostate is not enlarged.   BOWEL: Re-demonstration of gastric, small and large bowel mucosal hyperemia. There is in exophytic mass within the fundus of the stomach measuring approximately 2 cm x 1.5 cm as evidenced on series 601, image 38. Increased enhancement along the wall of the lower rectum and anal, which is nonspecific but may represent hemorrhoids. Otherwise, no  evidence of bowel wall thickening dilation. Interval decrease in size of left inguinal hernia containing decompressed small bowel loops. Sigmoid diverticulosis without evidence of acute diverticulitis. Appendix is not visualized with no evidence of pericecal inflammatory changes.   VESSELS: Chronic infrarenal abdominal aortic atherosclerosis.   PERITONEUM/RETROPERITONEUM/LYMPH NODES: There is no free or loculated fluid collection, no free intraperitoneal air. The retroperitoneum appears normal.  No abdominopelvic lymphadenopathy is present.   BONES AND ABDOMINAL WALL: No suspicious osseous lesions are identified.  Left inguinal hernia as described above. 8 cm by 3 cm cystic mass along the left flank likely represents a benign lipoma.       1.  A 2.0 x 1.5 cm exophytic mass within the posterior wall of the gastric fundus, concerning for underlying neoplasm, possibly gastrointestinal stromal tumor (GIST). Recommend further evaluation via endoscopy. 2. Redemonstration of circumferential mural thickening of the distal esophagus with patulous esophagus, concerning for esophagitis. 3. Extensive sigmoid colonic diverticulosis without evidence of acute diverticulitis. 4. Interval decrease in size of small bowel containing left inguinal hernia compared to 07/02/2024 CT. No evidence bowel wall thickening or obstruction. 5. Additional chronic findings as described above.     I personally reviewed the images/study and I agree with the findings as stated. This study was interpreted at [facility], [place], [place].   MACRO: Jose Conner discussed the significance and urgency of this critical finding by telephone with Gonzalo Dodson DO on 7/10/2024 at 3:53 pm. (**-RCF-**) Findings:  See findings.   Signed by: Dilshad Alvarado 7/10/2024 4:10 PM Dictation workstation:   [id number]    CT angio chest for pulmonary embolism    Result Date: 7/10/2024  Interpreted By:  Mariel Alicia, STUDY: CT  ANGIO CHEST FOR PULMONARY EMBOLISM;  7/10/2024 2:54 pm   INDICATION: Signs/Symptoms:syncope, hypoxia, wt loss.   COMPARISON: None   ACCESSION NUMBER(S): [id number]   ORDERING CLINICIAN: MARCIA FALCON   TECHNIQUE: Helical data acquisition of the chest was obtained after intravenous administration of 80 cc of Omnipaque 350 , as per PE protocol. Images were reformatted in coronal and sagittal planes. Axial and coronal maximum intensity projection (MIP) images were created and reviewed.   FINDINGS: POTENTIAL LIMITATIONS OF THE STUDY: None   HEART AND VESSELS: There are no discrete filling defects within main pulmonary artery and its branches to suggest acute pulmonary embolism. Main pulmonary artery and its branches are normal in caliber.   The thoracic aorta normal in course and caliber. No coronary artery calcifications are seen. Please note, the study is not optimized for evaluation of coronary arteries.   The cardiac chambers are not enlarged.   There is no pericardial effusion seen.   MEDIASTINUM AND ARNAUD, LOWER NECK AND AXILLA: The visualized thyroid gland is within normal limits. No evidence of thoracic lymphadenopathy by CT criteria. Esophagus is patulous with some ingested material.   LUNGS AND AIRWAYS: The trachea and central airways are patent. No endobronchial lesion is seen.   The bilateral lungs are clear without evidence of focal consolidation, pleural effusion, or pneumothorax.   Mild upper lung predominant emphysema   4 mm left upper lobe nodule, image 61/352.     UPPER ABDOMEN: The visualized subdiaphragmatic structures demonstrate no remarkable findings.       CHEST WALL AND OSSEOUS STRUCTURES: Chest wall is within normal limits. No acute osseous pathology.There are no suspicious osseous lesions.   Partially imaged fat density lesion in the left flank measuring at least 7.2 x 3.3 cm, likely a lipoma.       1. No evidence of acute pulmonary embolism. 2. Mild upper lung predominant emphysema. 3. 4 mm  left upper lobe nodule as described above. Instructions:  No further follow-up is required, however, if the patient has high risk factors for primary lung malignancy, follow-up noncontrast CT scan chest in 12 months may be obtained. (Manuel Pope et al., Guidelines for management of incidental pulmonary nodules detected on CT images: From the Fleischner Society 2017, Radiology. 2017 Jul;284 (1):228-243.) [id number] 4. Patulous esophagus with debris and ingested material. Correlate with motility disorder and reflux. 5. Partially imaged fat density lesion in the left flank, likely a lipoma.   MACRO: None   Signed by: Mariel Hatch 7/10/2024 3:25 PM Dictation workstation:   [id number]    CT head wo IV contrast    Result Date: 7/10/2024  Interpreted By:  Britt Rubi, STUDY: CT HEAD WO IV CONTRAST   INDICATION: Signs/Symptoms:syncope, hit head   COMPARISON:   Head CT 09/23/2023   ACCESSION NUMBER(S): [id number]   ORDERING CLINICIAN: MARCIA FALCON   TECHNIQUE: CT of the head was performed without the administration of intravenous contrast.   FINDINGS: BRAIN PARENCHYMA: No acute intraparenchymal hemorrhage, acute territorial infarct or masses.   MIDLINE SHIFT OR HERNIATION: No midline shift or herniation.   VENTRICLES: No hydrocephalus.   DURAL VENOUS SINUSES: No hyperdensity to suggest acute thrombus.   EXTRA-AXIAL SPACES (epidural, subdural, subarachnoid spaces and basal cisterns): No collections.   VISUALIZED ORBITS: Normal.   VISUALIZED PARANASAL SINUSES AND MASTOID AIR CELLS: Paranasal sinuses are clear. Tympanomastoid cavities are aerated.   SOFT TISSUES: Normal.   OSSEOUS STRUCTURES: Normal.       No acute intracranial abnormality.   Signed by: Britt Rubi 7/10/2024 3:08 PM Dictation workstation:   [id number]    XR chest 1 view    Result Date: 7/10/2024  Interpreted By:  Rajesh Gill, STUDY: XR CHEST 1 VIEW;  7/10/2024 2:36 pm   INDICATION: Signs/Symptoms:hypoxia.   COMPARISON:  09/23/2023   ACCESSION NUMBER(S): [id number]   ORDERING CLINICIAN: MARCIA FALCON   FINDINGS:         CARDIOMEDIASTINAL SILHOUETTE: Cardiomediastinal silhouette is normal in size and configuration.   LUNGS: Lungs are hyperinflated. There is no consolidation or effusion. There is no edema   ABDOMEN: No remarkable upper abdominal findings.   BONES: No acute osseous changes.       1. Hyperinflated lungs without acute abnormality       MACRO: None   Signed by: Rajesh Gill 7/10/2024 2:54 PM Dictation workstation:   [id number]                                                                           GI Procedures   Last EGD: No results found for this or any previous visit. No results found for this or any previous visit.  Last Colonoscopy: No results found for this or any previous visit. No results found for this or any previous visit.         ASSESSMENT / PLAN   Joe Alaniz is a 58 y.o. male with a past medical history of HTN, BPH, PUD s/p surgery as a teenager, nephrolithiasis, left inguinal hernia admitted on 7/10/2024 with N/V with an episode of coffee ground emesis, syncope. Patient found to be hypertensive to 180s/110s for which he received PRN pushes of hydralazine with improvement.  Additionally, the patient was found to be hypoxic to 88% on RA. CXR negative for acute findings, CTPE negative for PE. Labs were significant for hypokalemia, LEIF iso of hypovolemia. CT abdomen/pelvis demonstrated a 2.0 x 1.5 cm exophytic mass at the posterior wall of the gastric fundus concerning for GIST, circumferential mural thickening of the distal esophagus, sigmoid diverticulosis, and a left inguinal hernia.     Patient has N/V, dysphagia to solids and regurgitation with circumferential mural thickening of the distal esophagus on CT, most likely iso of obstructive causes vs esophagitis. Small episode of hematemesis, coffee ground emesis most likely in the setting of a Teresa-Mcmillan tear but due to the patient's history  of PUD and now NSAID use PUD is on the differential, gastritis, less likely esophageal varices given amount of bloody vomitus, hemodynamic stability but patient does have significant alcohol history. Additionally, CT abdomen/pelvis did demonstrate a mass concerning for GIST in the gastric fundus.              ASSESSMENT/PLAN:  -plan for EGD +/- EUS with anesthesia tomorrow to evaluate the gastric mass + patient's N/V, dysphagia w/ mural thickening of the distal esophagus on CT; NPO @ 12am, please order coags for beforehand as well  -continue IV PPI BID  -if unable to take biopsies during EGD, would send a stool H. Pylori given hx, additionally if ulceration on EGD would consider sending a gastrin level to evaluate for rarer causes of PUD such as Zollinger Spann  -will need colonoscopy as an outpatient as patient is overdue    Plan is not finalized till staffed with the attending physician Dr. Lantigua.    Thank you for this interesting consult. Gastroenterology will continue to follow.  -During weekday hours of 7am-5pm please do not hesitate to contact me on [employer] Chat or page [phone] if there are any further questions between the weekday hours of 7 AM - 5 PM.   -After hours, on weekends, and on holidays, please page the [place] GI fellow at [phone]. Thank you.    Marion Galeano MD  PGY-2 Internal Medicine

## 2024-07-11 NOTE — CARE PLAN
The patient's goals for the shift include  na    The clinical goals for the shift include N,V will be controlled, V/S will be restored to WNL.    Over the shift, the patient made progress toward the following goals.

## 2024-07-11 NOTE — CONSULTS
"Nutrition Initial Assessment:   Nutrition Assessment    Reason for Assessment: Admission nursing screening  MST= 4 for weight loss and eating poorly PTA.    Patient is a 58 y.o. male presenting with N/V and diarrhea.  Also noted to have a 50# weight loss since May 2024.  GI consulted for work-up with concern for GIST.    Past medical history includes HTN, BPH, left inguinal hernia and surgery for PUD     Nutrition History:  Food and Nutrient History: Met with patient.  He reports a decrease in PO intake for the last two months.  He says it started with pain in his hiatal hernia and then had  issues with N/V when eating.  He was trying to eat smaller meals and snacks to avoid N/V issues.  He reports that his mouth was burned and swollen from his stomach acid d/t vomiting which also made eating difficult. He had not tried anything like Boost or Ensure PTA but is interested in trying some once he is no longer NPO.  He is down in weight by 50#-- see below for weight history.      Anthropometrics:  Height: 167.7 cm (5' 6.02\")   Weight: (!) 44.5 kg (98 lb 1.7 oz)   BMI (Calculated): 15.82  IBW/kg (Dietitian Calculated): 64.5 kg  Percent of IBW: 69 %     Weight History:     7/11/24: 44.5kg  7/2/24: 59kg  5/6/24: 60.8kg    Pt says usual body weight is 68.2kg  This would be a loss of 34%.  EMR records show a loss of 27% in two months.  This is a significant loss.     Weight Change %:       Nutrition Focused Physical Exam Findings:    Subcutaneous Fat Loss:   Orbital Fat Pads: Severe (dark circles, hollowing and loose skin)  Buccal Fat Pads: Severe (hollow, sunken and narrow face)  Triceps: Severe (negligible fat tissue)  Muscle Wasting:  Temporalis: Severe (hollowed scooping depression)  Pectoralis (Clavicular Region): Severe (protruding prominent clavicle)  Deltoid/Trapezius: Severe (squared shoulders, acromion process prominent)  Quadriceps: Severe (depressions on inner and outer thigh)  Gastrocnemius: Severe (minimal " muscle definition)  Edema:  Edema: none  Physical Findings:  Skin: Negative    Nutrition Significant Labs:  BMP Trend:   Results from last 7 days   Lab Units 07/11/24  0623 07/11/24  0007 07/10/24  2019 07/10/24  1400   GLUCOSE mg/dL 130* 113*  --  153*   CALCIUM mg/dL 8.7 8.5*  --  9.2   SODIUM mmol/L 140 140  --  138   POTASSIUM mmol/L 3.4* 3.4*   < > 2.1*   CO2 mmol/L 27 28  --  30   CHLORIDE mmol/L 101 99  --  87*   BUN mg/dL 22 23  --  25*   CREATININE mg/dL 1.30 1.31*  --  1.63*    < > = values in this interval not displayed.    , A1C:  Lab Results   Component Value Date    HGBA1C 5.8 (H) 07/10/2024   , BG POCT trend:    , Renal Lab Trend:   Results from last 7 days   Lab Units 07/11/24  0623 07/11/24  0007 07/10/24  2019 07/10/24  1400   POTASSIUM mmol/L 3.4* 3.4* 2.3* 2.1*   PHOSPHORUS mg/dL 2.8 4.4  --  3.7   SODIUM mmol/L 140 140  --  138   MAGNESIUM mg/dL  --  1.78  --  1.91   EGFR mL/min/1.73m*2 63 62  --  48*   BUN mg/dL 22 23  --  25*   CREATININE mg/dL 1.30 1.31*  --  1.63*    , Vit D:   Lab Results   Component Value Date    VITD25 6 (L) 07/11/2024        Nutrition Specific Medications:  Scheduled medications  [Held by provider] amLODIPine, 10 mg, oral, Daily  [START ON 7/14/2024] ergocalciferol, 50,000 Units, oral, Every Sunday  folic acid, 1 mg, oral, Daily  [Held by provider] heparin (porcine), 5,000 Units, subcutaneous, q8h BEE  pantoprazole, 40 mg, intravenous, BID  [Held by provider] polyethylene glycol, 17 g, oral, Daily  potassium chloride, 20 mEq, intravenous, q2h  [START ON 7/12/2024] thiamine, 100 mg, intravenous, Daily      Continuous medications       PRN medications  PRN medications: acetaminophen, hydrALAZINE, lidocaine-diphenhydraMINE-Maalox 1:1:1, ondansetron, traMADol     I/O:   Last BM Date: 07/09/24; Stool Appearance: Bloody, Loose (07/10/24 1334)        Dietary Orders (From admission, onward)       Start     Ordered    07/11/24 0533  NPO Diet Except: Ice chips, Sips with meds;  Effective now  Diet effective now        Question Answer Comment   Except: Ice chips    Except: Sips with meds        07/11/24 0532                     Estimated Needs:   Total Energy Estimated Needs (kCal):  (6361-5140)  Method for Estimating Needs: MSJ= 1202  Total Protein Estimated Needs (g):  (65+)  Method for Estimating Needs: 1.5 x 44.5kg              Nutrition Diagnosis   Malnutrition Diagnosis  Patient has Malnutrition Diagnosis: Yes  Diagnosis Status: New  Malnutrition Diagnosis: Severe malnutrition related to chronic disease or condition  As Evidenced by: pt with a decrease in PO intake for the last two months with at least a 27% weight loss (possibly more) with noted severe fat and muscle loss on physical exam        Nutrition Interventions/Recommendations         Nutrition Prescription:   PO diet advancement per team's discretion to Regular once testing is complete  Would consider SLP eval in light of possible dysphagia.  Pt's Vitamin D level is deficient.  Would order 50,000IUs Vitamin D given once weekly for 6-8 weeks once able to take PO meds.           Nutrition Interventions:    RDN to order Boost VHC TID once diet advances= 530kcals, 22grams protein each       Nutrition Education:   Not appropriate       Nutrition Monitoring and Evaluation   Food/Nutrient Related History Monitoring  Monitoring and Evaluation Plan:  (ability to advance PO diet)         Time Spent/Follow-up Reminder:   Time Spent (min): 60 minutes  Last Date of Nutrition Visit: 07/11/24  Nutrition Follow-Up Needed?: Dietitian to reassess per policy  Follow up Comment: NPO, poss GIST; needs supps ordered; severe malnut

## 2024-07-11 NOTE — PROGRESS NOTES
"Medical Intensive Care Stepdown Unit - Daily Progress Note   Subjective    Joe Alaniz is a 58 y.o. year old male patient admitted on 7/10/2024 with persistent N/V, electrolyte abnormalities    Interval History:  Admitted to MICU Stepdown after a syncopal event 2/2 persistent N/V, hypovolemia    Meds    Scheduled medications  folic acid, 1 mg, intravenous, Once  [Held by provider] heparin (porcine), 5,000 Units, subcutaneous, q8h BEE  pantoprazole, 40 mg, intravenous, BID  [Held by provider] polyethylene glycol, 17 g, oral, Daily  thiamine, 100 mg, intravenous, Once      Continuous medications  potassium moybitc-C4-7.45%NaCl, 100 mL/hr, Last Rate: 100 mL/hr (07/11/24 0507)      PRN medications  PRN medications: acetaminophen, hydrALAZINE, lidocaine-diphenhydraMINE-Maalox 1:1:1, ondansetron     Objective    Blood pressure 124/84, pulse 77, temperature 36.4 °C (97.5 °F), temperature source Temporal, resp. rate 20, height 1.677 m (5' 6.02\"), weight (!) 44.5 kg (98 lb 1.7 oz), SpO2 100%.     Constitutional: cachectic middle aged male, good historian, appears uncomfortable with active N/V  Eyes: PERRL, EOMI, no icterus   ENMT: mucous membranes moist, +oral lesions  Head/Neck: Neck supple, no apparent injury  Respiratory/Thorax: Lungs CTA bilaterally, non-labored breathing, no cough, on RA  Cardiovascular: Regular, rate and rhythm, no murmurs, normal S1 and S2  Gastrointestinal: Nondistended, soft, non-tender, BS present x 4  Musculoskeletal: ROM intact, no joint swelling, normal strength  Extremities: normal extremities, no edema, contusions or wounds  Neurological: alert and oriented x 3, speech clear, follows commands appropriately, cr. n. II-XII intact, sensation grossly intact, motor 5/5 throughout  Skin: Warm and dry      Intake/Output Summary (Last 24 hours) at 7/11/2024 0640  Last data filed at 7/11/2024 0600  Gross per 24 hour   Intake 224.17 ml   Output 230 ml   Net -5.83 ml     Labs:   Results from last 72 " hours   Lab Units 07/11/24  0007 07/10/24  2019 07/10/24  1400   SODIUM mmol/L 140  --  138   POTASSIUM mmol/L 3.4* 2.3* 2.1*   CHLORIDE mmol/L 99  --  87*   CO2 mmol/L 28  --  30   BUN mg/dL 23  --  25*   CREATININE mg/dL 1.31*  --  1.63*   GLUCOSE mg/dL 113*  --  153*   CALCIUM mg/dL 8.5*  --  9.2   ANION GAP mmol/L 16  --  23   EGFR mL/min/1.73m*2 62  --  48*   PHOSPHORUS mg/dL 4.4  --  3.7      Results from last 72 hours   Lab Units 07/10/24  1400   WBC AUTO x10*3/uL 7.1   HEMOGLOBIN g/dL 15.8   HEMATOCRIT % 43.6   PLATELETS AUTO x10*3/uL 429   NEUTROS PCT AUTO % 76.0   LYMPHS PCT AUTO % 14.3   MONOS PCT AUTO % 8.9   EOS PCT AUTO % 0.1        Micro/ID:     Lab Results   Component Value Date    BLOODCULT Loaded on Instrument - Culture in progress 07/10/2024    BLOODCULT Loaded on Instrument - Culture in progress 07/10/2024       Summary of key imaging results from the last 24 hours  7/10 CT A/P :  IMPRESSION:  1.  A 2.0 x 1.5 cm exophytic mass within the posterior wall of the  gastric fundus, concerning for underlying neoplasm, possibly  gastrointestinal stromal tumor (GIST). Recommend further evaluation  via endoscopy.  2. Redemonstration of circumferential mural thickening of the distal  esophagus with patulous esophagus, concerning for esophagitis.  3. Extensive sigmoid colonic diverticulosis without evidence of acute  diverticulitis.  4. Interval decrease in size of small bowel containing left inguinal  hernia compared to 07/02/2024 CT. No evidence bowel wall thickening  or obstruction.  5. Additional chronic findings as described above.  7/10 CT PE:  IMPRESSION:  1. No evidence of acute pulmonary embolism.  2. Mild upper lung predominant emphysema.  3. 4 mm left upper lobe nodule as described above.  Instructions:  No further follow-up is required, however, if the  patient has high risk factors for primary lung malignancy, follow-up  noncontrast CT scan chest in 12 months may be obtained. (Manuel Pope et  al., Guidelines for management of incidental pulmonary  nodules detected on CT images: From the Fleischner Society 2017,  Radiology. 2017 Jul;284 (1):228-243.) [id number]  4. Patulous esophagus with debris and ingested material. Correlate  with motility disorder and reflux.  5. Partially imaged fat density lesion in the left flank, likely a  lipoma.  7/10 CT Head;  IMPRESSION:  No acute intracranial abnormality.    7/10 CXR:  IMPRESSION:  1. Hyperinflated lungs without acute abnormality    Assessment and Plan     Assessment: Joe Alaniz is a 58 y.o. year old male patient history of of HTN, BPH, left inguinal hernia and surgery for PUD many years ago admitted on 7/10/2024 with persistent N/V, 50# weight loss    Mechanical Ventilation: none  Sedation/Analgesia:  none  Restraints: no    Summary for 07/11/24  :  GI consult    Plan:  NEUROLOGY/PSYCH:  Dx: hx of Etoh use, quit May 2024.  C/o Headache, Tooth Pain and Stomatitis  Management:  Daily Folic Acid/Thiamine  PRN Tylenol for pain or HA  PRN BMX for oral pain.          Dental: Tooth fracture vs decay, upper molar  Dental consult   Panorex ordered    CARDIOVASCULAR:  Dx: Prolonged QTC .  Hx of HTN.  Admitted with Syncopal episode likely from dehydration, will obtain Echo and monitor on tele  Management:  Home meds: Amlodipine 10 mg daily, hydrochlorothiazide 25 mg daily, ASA 81 mg daily  Holding home meds while NPO  Echo    PULMONARY:  Dx:  Former Smoker (quit May), Acute hypoxic resp failure  Management:  Wean O2 as tolerated  IS while awake  No PFTs on file, will need Pulm follow up and PFTs    RENAL/GENITOURINARY:  Dx: Vit D Deficiency; LEIF.  Hx of BPH  Management:   Continue IVF  Start 50,000 international units  weekly supplements  Continue Flomax for Hx of BPH     GASTROENTEROLOGY:  Dx: Persistent N/V, possible GIST on imaging, d/dx includes malignancy.  Severe Protein Calorie Malnutrition with BMI 15.8 & subjective 50# weight loss  (9/2023 weight  60 kg), current weight 44.5 kg. Stomatitis 2/2 persistent N/V  Management:  GI consult  Keep NPO  PPI  CA 19-9 pending  Dietician consult  PRN BMX for oral pain.  Repeat      ENDOCRINOLOGY:  Dx: No acute issues  Management:  TSH normal  A1C 5.8 (7/2024)    HEMATOLOGY:  Dx: Normocytic Anemia, no signs of anemia  Management:  daily CBC  Subcutaneous heparin for DVT ppx    MUSCULOSKELETAL/ SKIN:  Dx: No acute issues  Management:  Q2H turns and OOB daily    INFECTIOUS DISEASE:  Dx: Elevated lactate, sepsis ruled out.  Lactate resolved with IVF resus  Management:  Tmax 36.8 C  Micro: 7/10 BC x2 NGTD, HIV Neg, Covid Neg (7/2- HIV Neg, Syphilis Total Ab neg, Group A strep neg, Flu/RSV/Covid Neg, HCV neg)    ICU Check List       FEN  Fluids: D5 0.45% NS at 100 ml/hr  Electrolytes: replete K  Nutrition: NPO  Prophylaxis:  DVT ppx: subcutaneous Heparin  GI ppx: PPI  Bowel care: colace, PRN Miralax  Hardware:  Catheter: None  Drains: None  Lines: PIV  Social:  Code: Full Code    HPOA: López Alaniz (Son), [phone]  Disposition: continue SDU care  Discharge Planning: from home    SMITHA Elder-CNP   07/11/24 at 6:40 AM     Pt discussed with Dr. Sadler, seen and examined. All labs, VS and previous plan of care reviewed.  I spent 45 minutes in the professional and overall care of this patient.      Disclaimer: Documentation completed with the information available at the time of input. The times in the chart may not be reflective of actual patient care times, interventions, or procedures. Documentation occurs after the physical care of the patient.

## 2024-07-11 NOTE — PROGRESS NOTES
Joe Alaniz is a 58 y.o. male on day 1 of admission presenting with Failure to thrive in adult.    Patient discussed in IDT. Per care team patient has had sever weight loss in the past month. See nutrition consult. Current [facility] 20. This TCC attempted to contact patient via call to bedside. No answer. Call made to cellphone.  This TCC spoke with patient, who was alert & oriented x3. Patient then presumed to be confused because he thought he was stillin the ED, and notes he does not remember them bring him up to the 6th floor.   Per conversation, patient lives alone in a duplex. Main story. Patient notes over the past month he has needed helping with ADLs and IADLs from son. Prior to admission he was eating/drinking chicken.  Noted his teeth were infected and noted he could not be seen by dentist. Patient tearful because he does not want to be sent home again because of his fear of dying at home.   Call made to listed son. No answer.   Care Transitions will continue to follow during course of hospital stay for any changes to discharge needs.  Yanique Lopez RN

## 2024-07-11 NOTE — H&P
"History Of Present Illness:    Joe Alaniz is a 58 y.o. male with a PMHx of HTN, BPH, left inguinal hernia and surgery for PUD many years ago presented to ED with nausea, vomiting and episode of runny diarrhea.  He states that he has had 3+ episodes of vomiting daily since May and endorses #50 weight loss since May.  He states today he \"fell out\" as he brother was entering room, does not recall hitting head or how long he was unconscious though recalls brother at this side.  He states that vomit was dark colored today and has seen some blood in on other days.  He states that he had not moved bowels in 1 week until today when he had a runny stool.  Appetite at home has been poor, some days not able to keep any food down.   At times he has had nausea before emesis but sometimes urge to vomit comes on quick.  He denies abd pain; fevers, chills.  Denies chest pain, sough, SOB Complaining of severe headache since this am.  He was in ED 2 days ago for mouth sores which have been present since May.  He has not been taking PPI as he did not know he had but took an over the counter calcium (rolaids) today.      PMHx:  HTN  BPH  PUD  Inguinal hernia left    PSHx:  Surgery for PUD    Social Hx:  Smoked 7 cigarettes daily (quit in May) since age 18s  Drank 1 glass ETOH daily until May  Denies vaping, street drugs    Home medications:  Norvasc 10 mg po daily  HCTZ 25 mg po daily  Asa 81 mg po daily  Meloxicam 15 mg po daily (has not used for months    Allergies:  Erythromycin - hives    Fhx:  Mother - CVA, HTN  Father-  liver cancer  Sister - diabetes,   Fathers siblings- all  from cancer    ROS:  Gen activity: weakness  Nutrition: poor appetite, + N/V, + diarrhea + constipation + #50 weight loss  HEENT: + headache, denies blurred vision  Pulm: denies SOB, denies cough  GI: + N/V, up to 3 emesis's day since May, + reflux, + hematemesis  : denies  Rheum: + arthritis  Skin: + oral lesions  Neuro: denies " seizures, denies previous DTs  Endo: denies DM  Psych: + anxiety         Last Recorded Vitals:  Vitals:    07/10/24 2215 07/10/24 2230 07/10/24 2323 07/11/24 0044   BP: (!) 144/96 (!) 159/113 (!) 178/114 (!) 175/111   BP Location:   Left arm    Patient Position:   Lying    Pulse: 77 87 82    Resp: 12 14 13    Temp:   36.3 °C (97.3 °F)    TempSrc:   Temporal    SpO2: 100% 100% 100%    Weight:       Height:           Last Labs:  Results for orders placed or performed during the hospital encounter of 07/10/24 (from the past 24 hour(s))   Sars-CoV-2 PCR   Result Value Ref Range    Coronavirus 2019, PCR Not Detected Not Detected   CBC and Auto Differential   Result Value Ref Range    WBC 7.1 4.4 - 11.3 x10*3/uL    nRBC 0.0 0.0 - 0.0 /100 WBCs    RBC 4.91 4.50 - 5.90 x10*6/uL    Hemoglobin 15.8 13.5 - 17.5 g/dL    Hematocrit 43.6 41.0 - 52.0 %    MCV 89 80 - 100 fL    MCH 32.2 26.0 - 34.0 pg    MCHC 36.2 (H) 32.0 - 36.0 g/dL    RDW 11.9 11.5 - 14.5 %    Platelets 429 150 - 450 x10*3/uL    Neutrophils % 76.0 40.0 - 80.0 %    Immature Granulocytes %, Automated 0.6 0.0 - 0.9 %    Lymphocytes % 14.3 13.0 - 44.0 %    Monocytes % 8.9 2.0 - 10.0 %    Eosinophils % 0.1 0.0 - 6.0 %    Basophils % 0.1 0.0 - 2.0 %    Neutrophils Absolute 5.40 1.20 - 7.70 x10*3/uL    Immature Granulocytes Absolute, Automated 0.04 0.00 - 0.70 x10*3/uL    Lymphocytes Absolute 1.02 (L) 1.20 - 4.80 x10*3/uL    Monocytes Absolute 0.63 0.10 - 1.00 x10*3/uL    Eosinophils Absolute 0.01 0.00 - 0.70 x10*3/uL    Basophils Absolute 0.01 0.00 - 0.10 x10*3/uL   Comprehensive metabolic panel   Result Value Ref Range    Glucose 153 (H) 74 - 99 mg/dL    Sodium 138 136 - 145 mmol/L    Potassium 2.1 (LL) 3.5 - 5.3 mmol/L    Chloride 87 (L) 98 - 107 mmol/L    Bicarbonate 30 21 - 32 mmol/L    Anion Gap 23 mmol/L    Urea Nitrogen 25 (H) 6 - 23 mg/dL    Creatinine 1.63 (H) 0.50 - 1.30 mg/dL    eGFR 48 (L) >60 mL/min/1.73m*2    Calcium 9.2 8.6 - 10.6 mg/dL    Albumin 4.4  3.4 - 5.0 g/dL    Alkaline Phosphatase 67 33 - 136 U/L    Total Protein 7.3 6.4 - 8.2 g/dL    AST 28 9 - 39 U/L    Bilirubin, Total 1.4 (H) 0.0 - 1.2 mg/dL    ALT 28 10 - 52 U/L   Magnesium   Result Value Ref Range    Magnesium 1.91 1.60 - 2.40 mg/dL   Phosphorus   Result Value Ref Range    Phosphorus 3.7 2.5 - 4.9 mg/dL   Lipase   Result Value Ref Range    Lipase     Lactate   Result Value Ref Range    Lactate 4.3 (HH) 0.4 - 2.0 mmol/L   Type and Screen   Result Value Ref Range    ABO TYPE O     Rh TYPE POS     ANTIBODY SCREEN NEG    Blood Culture    Specimen: Peripheral Venipuncture; Blood culture   Result Value Ref Range    Blood Culture Loaded on Instrument - Culture in progress    Blood Culture    Specimen: Peripheral Venipuncture; Blood culture   Result Value Ref Range    Blood Culture Loaded on Instrument - Culture in progress    HIV 1/2 Antigen/Antibody Screen with Reflex to Confirmation   Result Value Ref Range    HIV 1/2 Antigen/Antibody Screen with Reflex to Confirmation Nonreactive Nonreactive   Troponin I, High Sensitivity   Result Value Ref Range    Troponin I, High Sensitivity (CMC) 16 0 - 53 ng/L   Lactate   Result Value Ref Range    Lactate 2.2 (H) 0.4 - 2.0 mmol/L   Potassium   Result Value Ref Range    Potassium 2.3 (LL) 3.5 - 5.3 mmol/L   Magnesium   Result Value Ref Range    Magnesium 1.78 1.60 - 2.40 mg/dL   Renal Function Panel   Result Value Ref Range    Glucose 113 (H) 74 - 99 mg/dL    Sodium 140 136 - 145 mmol/L    Potassium 3.4 (L) 3.5 - 5.3 mmol/L    Chloride 99 98 - 107 mmol/L    Bicarbonate 28 21 - 32 mmol/L    Anion Gap 16 10 - 20 mmol/L    Urea Nitrogen 23 6 - 23 mg/dL    Creatinine 1.31 (H) 0.50 - 1.30 mg/dL    eGFR 62 >60 mL/min/1.73m*2    Calcium 8.5 (L) 8.6 - 10.6 mg/dL    Phosphorus 4.4 2.5 - 4.9 mg/dL    Albumin 3.5 3.4 - 5.0 g/dL   Lactate   Result Value Ref Range    Lactate 2.0 0.4 - 2.0 mmol/L       Last I/O:  No intake/output data recorded.    Past Cardiology Tests (Last 3  "Years):  EKG:  ECG 12 lead 07/03/2024      ECG 12 lead 07/02/2024      ECG 12 lead 05/06/2024    Echo:  No results found for this or any previous visit from the past 1095 days.    Ejection Fractions:  No results found for: \"EF\"  Cath:  No results found for this or any previous visit from the past 1095 days.    Stress Test:  No results found for this or any previous visit from the past 1095 days.    Cardiac Imaging:  No results found for this or any previous visit from the past 1095 days.      Past Medical History:  He has no past medical history on file.    Past Surgical History:  He has no past surgical history on file.      Social History:  He has no history on file for tobacco use, alcohol use, and drug use.    Family History:  No family history on file.     Allergies:  Erythromycin    Inpatient Medications:  Scheduled medications   Medication Dose Route Frequency    polyethylene glycol  17 g oral Daily    potassium chloride  20 mEq intravenous q2h     PRN medications   Medication    acetaminophen    hydrALAZINE     Continuous Medications   Medication Dose Last Rate     Outpatient Medications:  Current Outpatient Medications   Medication Instructions    acetaminophen (TYLENOL) 1,000 mg, oral, Every 6 hours PRN    amLODIPine (NORVASC) 10 mg, oral, Daily    amoxicillin (Amoxil) 500 mg tablet 1 tablet, oral, Every 8 hours, Until finished    benzocaine-menthol (Cepastat Sore Throat) lozenge 1 lozenge, Mouth/Throat, Every 2 hour PRN    chlorhexidine (Peridex) 0.12 % solution 15 mL, Mouth/Throat, As needed    diphenhydramine/Maalox/lidocaine (Magic Mouthwash) - Compounded - Outpatient Swish and spit every 4-6 hours as needed for mouth sores.    hydroCHLOROthiazide (HYDRODIURIL) 12.5 mg, oral, Daily    ibuprofen 600 mg, oral, Every 8 hours PRN    psyllium (Metamucil) 3.4 gram packet 1 packet, oral, 2 times daily, Mix and drink with at least 8 ounces of water or juice.       Physical Exam:  Gen: pt laying in bed in no " apparent distress  Skin: intact warm and dry  HEENT: beefy red tongue with red lips, appearance of healed upper left lip sore and oral palate red, unable to visualize any other lesions; PERRL, sclera non icteric  Neck: thinning, no carotid bruits, no JVD appreciated  Pulm: respirations even, unlabored on 2L with full chest rise with each breath, lungs clear to auscultation bilaterally.   Cardiac: RRR S1S2 no murmur or gallop noted  GI: flat ,non tender to palpation, + bowel sounds  MSK: no tenderness to palpation of spine, no joint swelling or warmth  Ext: no lower ext edema, palpable radial and pedal pulses        Assessment/Plan   Joe Alaniz is a 58 y.o. male with PMHx of HTN, BPH, left inguinal hernia and surgery for PUD who presented to ED today with nausea and vomiting daily since early May and an episode of runny diarrhea today.  He states that he has had 3+ episodes of vomiting daily since May and endorses #50 weight loss since May.  Today he had a syncopal episode and an episode of runny dark stool so presented to ED.  His electrolytes were replaced and still with low potassium so he is being admitted to the SDU for electrolytes management and replacement.      Neuro:  #Headache- likely from elevated BP on admission  - prn tylenol  #syncopal episode- unclear if related to dehydration, electrolyte abnormality or cardiac arrhythmia  - pt denied hitting head but stated he had in ED  - CT head unremarkable  - had 2L IVF in ED  - cont telemetry  - replace electrolytes; check RFP q 4 hours  - last K= 3.4, give additional 40 meq over 4 hours  - replete magnesium (1.78)  #history of ETOH use until May when he states he quit.    - thiamine, folic acid  - check b12, folate  - no need for CIWA as not recent etoh use    Pulm:  #Hypoxia on ED admission 88% on room air  - CXR in ED without acute finding;  concern for aspiration given volume and frequency of vomiting  - CT PE without evidence of PE  - cont O2, wean as  tolerate  #Tobacco use history - mild emphysema noted on CXR.  Per pt he quit in May    CV:  #HTN: on arrival 's / 110s  - per pt he missed his am antihypertensive medications  - prn hydralazine for SBP > 180; DBP > 100  - consider TTE as no history of  #Syncope: unclear reason for syncopal event this am  - prolonged QT seen on ekg in ED  - EKG on 7/11 QTc = 455  - Zofran 4 mg IV prn for nausea/vomiting  - repeat EKG this am to monitor for- Qtc = 455  - cont telemetry  #elevated lactate  - lactate on admission 4.3 --> 2.2 after 2L IVF --> 2.0  - likely from recurrent vomiting  - cont to trend  - follow up blood cultures    GI:  #recurrent N/V with at least 3 emeses daily; 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 Esoph varices (given etoh history) vs stomach ulcer (daily asa use, previous etoh use and recurrent vomiting) vs gastritis (from prolonged vomiting)  - consult GI in am for EGD/c scope, further workup.  Will need biopsy if able for KIT enzyme or platelet derived GF receptor alpha    - MRI vs PET for further workup  - cont NPO x ice chips  - monitor CBC  #likely esophagitis   - thickening of esophagus seen on chest CT  - previous Rx for PPI but pt not currently taking, did not know he had  - PPI BID    /Renal:  #LEIF:  - creatinine on 7/3 = 0.55; today in ED 1.63  - pt was given IVF in ED, repeat creatinine 1.31  - cont maintenance IVF  - monitor creatinine  #BPH:  - history of flomax on med rec, pt denies currently taking or issues with urination  - cont to monitor, restart flomax if needed    Rheum:  #arthritis  - hx of taking meloxicam  - cont prn tylenol    Endo:  #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  (see above)  - check TSH  #family history diabetes  - last hgba1c in 9/23 = 5.1%;  glucose 113 on RFP    Psych:  #anxiety  - cont to monitor, address as needed    DVT Ppx: Heparin SQ on hold for possible egd, further  hematemesis    Dispo: no PCP, lives in home        Peripheral IV 07/10/24 20 G Left;Proximal;Upper Arm (Active)   Site Assessment Intact;Clean 07/10/24 1613   Dressing Type Transparent 07/10/24 1613   Line Status Blood return noted 07/10/24 1613   Dressing Status Dry;Occlusive;Clean 07/10/24 1613   Number of days: 1       Code Status:  Full Code    I spent 60 minutes in the professional and overall care of this patient.        Eneida Smalls, APRN-CNP

## 2024-07-12 ENCOUNTER — OFFICE VISIT (OUTPATIENT)
Dept: SURGICAL ONCOLOGY | Facility: HOSPITAL | Age: 59
End: 2024-07-12
Payer: COMMERCIAL

## 2024-07-12 ENCOUNTER — ANESTHESIA EVENT (OUTPATIENT)
Dept: GASTROENTEROLOGY | Facility: HOSPITAL | Age: 59
End: 2024-07-12
Payer: COMMERCIAL

## 2024-07-12 ENCOUNTER — APPOINTMENT (OUTPATIENT)
Dept: GASTROENTEROLOGY | Facility: HOSPITAL | Age: 59
End: 2024-07-12
Payer: COMMERCIAL

## 2024-07-12 ENCOUNTER — ANESTHESIA (OUTPATIENT)
Dept: GASTROENTEROLOGY | Facility: HOSPITAL | Age: 59
End: 2024-07-12
Payer: COMMERCIAL

## 2024-07-12 PROBLEM — I10 HTN (HYPERTENSION): Status: ACTIVE | Noted: 2024-07-12

## 2024-07-12 LAB
ALBUMIN SERPL BCP-MCNC: 3.1 G/DL (ref 3.4–5)
ALBUMIN SERPL BCP-MCNC: 3.3 G/DL (ref 3.4–5)
ANION GAP SERPL CALC-SCNC: 10 MMOL/L (ref 10–20)
ANION GAP SERPL CALC-SCNC: 13 MMOL/L (ref 10–20)
APTT PPP: 23 SECONDS (ref 27–38)
ATRIAL RATE: 80 BPM
ATRIAL RATE: 83 BPM
BUN SERPL-MCNC: 12 MG/DL (ref 6–23)
BUN SERPL-MCNC: 7 MG/DL (ref 6–23)
CALCIUM SERPL-MCNC: 7.8 MG/DL (ref 8.6–10.6)
CALCIUM SERPL-MCNC: 7.9 MG/DL (ref 8.6–10.6)
CHLORIDE SERPL-SCNC: 103 MMOL/L (ref 98–107)
CHLORIDE SERPL-SCNC: 104 MMOL/L (ref 98–107)
CO2 SERPL-SCNC: 27 MMOL/L (ref 21–32)
CO2 SERPL-SCNC: 28 MMOL/L (ref 21–32)
CREAT SERPL-MCNC: 0.69 MG/DL (ref 0.5–1.3)
CREAT SERPL-MCNC: 0.83 MG/DL (ref 0.5–1.3)
EGFRCR SERPLBLD CKD-EPI 2021: >90 ML/MIN/1.73M*2
EGFRCR SERPLBLD CKD-EPI 2021: >90 ML/MIN/1.73M*2
ERYTHROCYTE [DISTWIDTH] IN BLOOD BY AUTOMATED COUNT: 12.1 % (ref 11.5–14.5)
GLUCOSE BLD MANUAL STRIP-MCNC: 96 MG/DL (ref 74–99)
GLUCOSE SERPL-MCNC: 89 MG/DL (ref 74–99)
GLUCOSE SERPL-MCNC: 92 MG/DL (ref 74–99)
HCT VFR BLD AUTO: 29.7 % (ref 41–52)
HGB BLD-MCNC: 10 G/DL (ref 13.5–17.5)
INR PPP: 1 (ref 0.9–1.1)
MAGNESIUM SERPL-MCNC: 1.92 MG/DL (ref 1.6–2.4)
MCH RBC QN AUTO: 31.7 PG (ref 26–34)
MCHC RBC AUTO-ENTMCNC: 33.7 G/DL (ref 32–36)
MCV RBC AUTO: 94 FL (ref 80–100)
NRBC BLD-RTO: 0 /100 WBCS (ref 0–0)
P AXIS: 68 DEGREES
P AXIS: 71 DEGREES
P OFFSET: 186 MS
P OFFSET: 192 MS
P ONSET: 139 MS
P ONSET: 145 MS
PHOSPHATE SERPL-MCNC: 1.8 MG/DL (ref 2.5–4.9)
PHOSPHATE SERPL-MCNC: 2.1 MG/DL (ref 2.5–4.9)
PLATELET # BLD AUTO: 318 X10*3/UL (ref 150–450)
POTASSIUM SERPL-SCNC: 3.1 MMOL/L (ref 3.5–5.3)
POTASSIUM SERPL-SCNC: 3.7 MMOL/L (ref 3.5–5.3)
PR INTERVAL: 148 MS
PR INTERVAL: 158 MS
PROTHROMBIN TIME: 11.3 SECONDS (ref 9.8–12.8)
Q ONSET: 213 MS
Q ONSET: 224 MS
QRS COUNT: 13 BEATS
QRS COUNT: 14 BEATS
QRS DURATION: 78 MS
QRS DURATION: 88 MS
QT INTERVAL: 372 MS
QT INTERVAL: 434 MS
QTC CALCULATION(BAZETT): 437 MS
QTC CALCULATION(BAZETT): 500 MS
QTC FREDERICIA: 414 MS
QTC FREDERICIA: 478 MS
R AXIS: -37 DEGREES
R AXIS: -42 DEGREES
RBC # BLD AUTO: 3.15 X10*6/UL (ref 4.5–5.9)
SODIUM SERPL-SCNC: 139 MMOL/L (ref 136–145)
SODIUM SERPL-SCNC: 139 MMOL/L (ref 136–145)
T AXIS: 1 DEGREES
T AXIS: 5 DEGREES
T OFFSET: 399 MS
T OFFSET: 441 MS
VENTRICULAR RATE: 80 BPM
VENTRICULAR RATE: 83 BPM
WBC # BLD AUTO: 6 X10*3/UL (ref 4.4–11.3)

## 2024-07-12 PROCEDURE — 2720000007 HC OR 272 NO HCPCS

## 2024-07-12 PROCEDURE — 2500000005 HC RX 250 GENERAL PHARMACY W/O HCPCS: Performed by: NURSE PRACTITIONER

## 2024-07-12 PROCEDURE — 2500000005 HC RX 250 GENERAL PHARMACY W/O HCPCS

## 2024-07-12 PROCEDURE — 85610 PROTHROMBIN TIME: CPT

## 2024-07-12 PROCEDURE — 88305 TISSUE EXAM BY PATHOLOGIST: CPT | Performed by: PATHOLOGY

## 2024-07-12 PROCEDURE — 36415 COLL VENOUS BLD VENIPUNCTURE: CPT

## 2024-07-12 PROCEDURE — 3700000001 HC GENERAL ANESTHESIA TIME - INITIAL BASE CHARGE

## 2024-07-12 PROCEDURE — 2500000004 HC RX 250 GENERAL PHARMACY W/ HCPCS (ALT 636 FOR OP/ED)

## 2024-07-12 PROCEDURE — 2500000001 HC RX 250 WO HCPCS SELF ADMINISTERED DRUGS (ALT 637 FOR MEDICARE OP): Performed by: NURSE PRACTITIONER

## 2024-07-12 PROCEDURE — 2060000001 HC INTERMEDIATE ICU ROOM DAILY

## 2024-07-12 PROCEDURE — 93005 ELECTROCARDIOGRAM TRACING: CPT

## 2024-07-12 PROCEDURE — 80069 RENAL FUNCTION PANEL: CPT

## 2024-07-12 PROCEDURE — 88312 SPECIAL STAINS GROUP 1: CPT | Performed by: PATHOLOGY

## 2024-07-12 PROCEDURE — 0DJ08ZZ INSPECTION OF UPPER INTESTINAL TRACT, VIA NATURAL OR ARTIFICIAL OPENING ENDOSCOPIC: ICD-10-PCS | Performed by: INTERNAL MEDICINE

## 2024-07-12 PROCEDURE — 0DB38ZX EXCISION OF LOWER ESOPHAGUS, VIA NATURAL OR ARTIFICIAL OPENING ENDOSCOPIC, DIAGNOSTIC: ICD-10-PCS | Performed by: INTERNAL MEDICINE

## 2024-07-12 PROCEDURE — 85027 COMPLETE CBC AUTOMATED: CPT | Performed by: NURSE PRACTITIONER

## 2024-07-12 PROCEDURE — 36415 COLL VENOUS BLD VENIPUNCTURE: CPT | Performed by: NURSE PRACTITIONER

## 2024-07-12 PROCEDURE — 2500000004 HC RX 250 GENERAL PHARMACY W/ HCPCS (ALT 636 FOR OP/ED): Performed by: NURSE PRACTITIONER

## 2024-07-12 PROCEDURE — 2500000002 HC RX 250 W HCPCS SELF ADMINISTERED DRUGS (ALT 637 FOR MEDICARE OP, ALT 636 FOR OP/ED)

## 2024-07-12 PROCEDURE — 80069 RENAL FUNCTION PANEL: CPT | Performed by: NURSE PRACTITIONER

## 2024-07-12 PROCEDURE — 0761T DGTZ GLS MCRSCP SL IMM EA 1: CPT | Mod: TC,SUR | Performed by: INTERNAL MEDICINE

## 2024-07-12 PROCEDURE — 43239 EGD BIOPSY SINGLE/MULTIPLE: CPT | Performed by: INTERNAL MEDICINE

## 2024-07-12 PROCEDURE — 7100000009 HC PHASE TWO TIME - INITIAL BASE CHARGE

## 2024-07-12 PROCEDURE — 3700000002 HC GENERAL ANESTHESIA TIME - EACH INCREMENTAL 1 MINUTE

## 2024-07-12 PROCEDURE — 7100000010 HC PHASE TWO TIME - EACH INCREMENTAL 1 MINUTE

## 2024-07-12 PROCEDURE — 83735 ASSAY OF MAGNESIUM: CPT | Performed by: NURSE PRACTITIONER

## 2024-07-12 PROCEDURE — 99233 SBSQ HOSP IP/OBS HIGH 50: CPT

## 2024-07-12 PROCEDURE — 43242 EGD US FINE NEEDLE BX/ASPIR: CPT | Performed by: INTERNAL MEDICINE

## 2024-07-12 PROCEDURE — 88342 IMHCHEM/IMCYTCHM 1ST ANTB: CPT | Performed by: PATHOLOGY

## 2024-07-12 PROCEDURE — 99233 SBSQ HOSP IP/OBS HIGH 50: CPT | Performed by: INTERNAL MEDICINE

## 2024-07-12 PROCEDURE — 82947 ASSAY GLUCOSE BLOOD QUANT: CPT

## 2024-07-12 PROCEDURE — 93010 ELECTROCARDIOGRAM REPORT: CPT | Performed by: INTERNAL MEDICINE

## 2024-07-12 PROCEDURE — 88341 IMHCHEM/IMCYTCHM EA ADD ANTB: CPT | Performed by: PATHOLOGY

## 2024-07-12 PROCEDURE — C9113 INJ PANTOPRAZOLE SODIUM, VIA: HCPCS | Performed by: NURSE PRACTITIONER

## 2024-07-12 PROCEDURE — 0DB78ZX EXCISION OF STOMACH, PYLORUS, VIA NATURAL OR ARTIFICIAL OPENING ENDOSCOPIC, DIAGNOSTIC: ICD-10-PCS | Performed by: INTERNAL MEDICINE

## 2024-07-12 RX ORDER — ONDANSETRON HYDROCHLORIDE 2 MG/ML
4 INJECTION, SOLUTION INTRAVENOUS ONCE AS NEEDED
Status: DISCONTINUED | OUTPATIENT
Start: 2024-07-12 | End: 2024-07-13

## 2024-07-12 RX ORDER — LIDOCAINE HYDROCHLORIDE 20 MG/ML
INJECTION, SOLUTION INFILTRATION; PERINEURAL AS NEEDED
Status: DISCONTINUED | OUTPATIENT
Start: 2024-07-12 | End: 2024-07-12

## 2024-07-12 RX ORDER — SODIUM CHLORIDE, SODIUM LACTATE, POTASSIUM CHLORIDE, CALCIUM CHLORIDE 600; 310; 30; 20 MG/100ML; MG/100ML; MG/100ML; MG/100ML
INJECTION, SOLUTION INTRAVENOUS CONTINUOUS PRN
Status: DISCONTINUED | OUTPATIENT
Start: 2024-07-12 | End: 2024-07-12

## 2024-07-12 RX ORDER — POTASSIUM CHLORIDE 20 MEQ/1
20 TABLET, EXTENDED RELEASE ORAL ONCE
Status: COMPLETED | OUTPATIENT
Start: 2024-07-12 | End: 2024-07-12

## 2024-07-12 RX ORDER — LIDOCAINE HYDROCHLORIDE 10 MG/ML
0.1 INJECTION INFILTRATION; PERINEURAL ONCE
Status: DISCONTINUED | OUTPATIENT
Start: 2024-07-12 | End: 2024-07-13

## 2024-07-12 RX ORDER — PROPOFOL 10 MG/ML
INJECTION, EMULSION INTRAVENOUS CONTINUOUS PRN
Status: DISCONTINUED | OUTPATIENT
Start: 2024-07-12 | End: 2024-07-12

## 2024-07-12 RX ORDER — PHENYLEPHRINE HCL IN 0.9% NACL 0.4MG/10ML
SYRINGE (ML) INTRAVENOUS AS NEEDED
Status: DISCONTINUED | OUTPATIENT
Start: 2024-07-12 | End: 2024-07-12

## 2024-07-12 RX ORDER — SODIUM CHLORIDE, SODIUM LACTATE, POTASSIUM CHLORIDE, CALCIUM CHLORIDE 600; 310; 30; 20 MG/100ML; MG/100ML; MG/100ML; MG/100ML
100 INJECTION, SOLUTION INTRAVENOUS CONTINUOUS
Status: DISCONTINUED | OUTPATIENT
Start: 2024-07-12 | End: 2024-07-12

## 2024-07-12 RX ORDER — FENTANYL CITRATE 50 UG/ML
INJECTION, SOLUTION INTRAMUSCULAR; INTRAVENOUS AS NEEDED
Status: DISCONTINUED | OUTPATIENT
Start: 2024-07-12 | End: 2024-07-12

## 2024-07-12 RX ORDER — POTASSIUM CHLORIDE 14.9 MG/ML
20 INJECTION INTRAVENOUS ONCE
Status: COMPLETED | OUTPATIENT
Start: 2024-07-12 | End: 2024-07-12

## 2024-07-12 RX ORDER — PHENYLEPHRINE HCL IN 0.9% NACL 1 MG/10 ML
SYRINGE (ML) INTRAVENOUS AS NEEDED
Status: DISCONTINUED | OUTPATIENT
Start: 2024-07-12 | End: 2024-07-12

## 2024-07-12 ASSESSMENT — PAIN SCALES - GENERAL
PAINLEVEL_OUTOF10: 0 - NO PAIN
PAINLEVEL_OUTOF10: 0 - NO PAIN
PAINLEVEL_OUTOF10: 10 - WORST POSSIBLE PAIN
PAINLEVEL_OUTOF10: 0 - NO PAIN
PAINLEVEL_OUTOF10: 10 - WORST POSSIBLE PAIN
PAINLEVEL_OUTOF10: 10 - WORST POSSIBLE PAIN
PAINLEVEL_OUTOF10: 0 - NO PAIN

## 2024-07-12 ASSESSMENT — COGNITIVE AND FUNCTIONAL STATUS - GENERAL
EATING MEALS: A LITTLE
PERSONAL GROOMING: A LITTLE
HELP NEEDED FOR BATHING: A LITTLE
DRESSING REGULAR UPPER BODY CLOTHING: A LITTLE
MOBILITY SCORE: 20
TOILETING: A LITTLE
CLIMB 3 TO 5 STEPS WITH RAILING: A LITTLE
STANDING UP FROM CHAIR USING ARMS: A LITTLE
MOVING TO AND FROM BED TO CHAIR: A LITTLE
DAILY ACTIVITIY SCORE: 18
WALKING IN HOSPITAL ROOM: A LITTLE
DRESSING REGULAR LOWER BODY CLOTHING: A LITTLE

## 2024-07-12 ASSESSMENT — PAIN - FUNCTIONAL ASSESSMENT
PAIN_FUNCTIONAL_ASSESSMENT: 0-10

## 2024-07-12 ASSESSMENT — PAIN DESCRIPTION - DESCRIPTORS: DESCRIPTORS: STABBING

## 2024-07-12 ASSESSMENT — PAIN DESCRIPTION - ORIENTATION: ORIENTATION: UPPER

## 2024-07-12 NOTE — ANESTHESIA POSTPROCEDURE EVALUATION
Patient: Joe Alaniz    Procedure Summary       Date: 07/12/24 Room / Location: [facility]    Anesthesia Start: 1353 Anesthesia Stop: 1458    Procedure: EGD Diagnosis: Gastric mass    Scheduled Providers: Kip Ramos MD Responsible Provider: Suze Valiente MD    Anesthesia Type: MAC ASA Status: 3            Anesthesia Type: MAC    Vitals Value Taken Time   /100 07/12/24 1505   Temp 36 07/12/24 1518   Pulse 81 07/12/24 1505   Resp 16 07/12/24 1505   SpO2 97 % 07/12/24 1505       Anesthesia Post Evaluation    Patient location during evaluation: PACU  Patient participation: complete - patient participated  Level of consciousness: awake  Pain management: adequate  Airway patency: patent  Cardiovascular status: acceptable  Respiratory status: acceptable  Hydration status: acceptable  Postoperative Nausea and Vomiting: none        No notable events documented.

## 2024-07-12 NOTE — PROGRESS NOTES
"Joe Alaniz is a 58 y.o. male on day 2 of admission presenting with Failure to thrive in adult.    Subjective     Overnight ongoing oral pain limiting the amount of sleep he could get.  ROS: Endorses ongoing oral pain but slightly improved from overnight.  Also endorses mild abdominal discomfort.  Denies fever, chills, cp, sob, and n/v     Objective   Physical Exam:  Constitutional: cachectic male in NAD, alert and cooperative  Eyes: PERRL, EOMI, no icterus   ENMT: mucous membranes moist, oral lesions noted, poor dentition   Head/Neck: Neck supple, no apparent injury  Respiratory/Thorax: Lungs CTA bilaterally, non-labored breathing, no cough, on RA  Cardiovascular: Regular, rate and rhythm, no murmurs, normal S1 and S2  Gastrointestinal: Nondistended, soft, non-tender, BS present x 4  Musculoskeletal: ROM intact  Extremities: normal extremities, no edema, contusions or wounds  Neurological: alert and oriented x 3, speech clear, follows commands appropriately, without focal deficits  Skin: Warm and dry    Vital Signs  Blood pressure (!) 139/91, pulse 82, temperature 36.1 °C (97 °F), temperature source Temporal, resp. rate 16, height 1.677 m (5' 6.02\"), weight (!) 44.5 kg (98 lb 1.7 oz), SpO2 100%.  Oxygen Therapy  SpO2: 100 %  Medical Gas Therapy: None (Room air)  O2 Delivery Method: Nasal cannula       Intake/Output last 3 Shifts:  I/O last 3 completed shifts:  In: 2074.8 (46.6 mL/kg) [P.O.:270; I.V.:459.2 (10.3 mL/kg); IV Piggyback:1345.7]  Out: 480 (10.8 mL/kg) [Urine:480 (0.3 mL/kg/hr)]  Weight: 44.5 kg     Lines and Tubes:  Peripheral IV 07/10/24 20 G Right;Ventral Antecubital (Active)   Placement Date/Time: 07/10/24 1334   Placed by External Staff?: (c)   Hand Hygiene Completed: Yes  Size (Gauge): 20 G  Orientation: Right;Ventral  Location: Antecubital  Site Prep: Chlorhexidine   Local Anesthetic: None  Technique: Anatomical landmark...   Number of days: 1       Peripheral IV 07/10/24 20 G Left;Proximal;Upper " Arm (Active)   Placement Date/Time: 07/10/24 1500   Hand Hygiene Completed: Yes  Size (Gauge): 20 G  Orientation: Left;Proximal;Upper  Location: Arm  Site Prep: Chlorhexidine    Number of days: 1         Relevant Results  Scheduled medications  amLODIPine, 10 mg, oral, Daily  [START ON 7/14/2024] ergocalciferol, 50,000 Units, oral, Every Sunday  folic acid, 1 mg, oral, Daily  [Held by provider] heparin (porcine), 5,000 Units, subcutaneous, q8h BEE  pantoprazole, 40 mg, intravenous, BID  [Held by provider] polyethylene glycol, 17 g, oral, Daily  potassium chloride CR, 20 mEq, oral, Once  potassium phosphate, 21 mmol, intravenous, Once  thiamine, 100 mg, intravenous, Daily      Continuous medications  D5 % and 0.9 % sodium chloride, 50 mL/hr, Last Rate: 50 mL/hr (07/11/24 1734)      PRN medications  PRN medications: acetaminophen, benzocaine-menthol, hydrALAZINE, lidocaine-diphenhydraMINE-Maalox 1:1:1, ondansetron, traMADol    Results for orders placed or performed during the hospital encounter of 07/10/24 (from the past 24 hour(s))   Renal Function Panel   Result Value Ref Range    Glucose 124 (H) 74 - 99 mg/dL    Sodium 138 136 - 145 mmol/L    Potassium 3.5 3.5 - 5.3 mmol/L    Chloride 102 98 - 107 mmol/L    Bicarbonate 31 21 - 32 mmol/L    Anion Gap 9 (L) 10 - 20 mmol/L    Urea Nitrogen 19 6 - 23 mg/dL    Creatinine 1.10 0.50 - 1.30 mg/dL    eGFR 77 >60 mL/min/1.73m*2    Calcium 8.5 (L) 8.6 - 10.6 mg/dL    Phosphorus 1.6 (L) 2.5 - 4.9 mg/dL    Albumin 3.5 3.4 - 5.0 g/dL   Magnesium   Result Value Ref Range    Magnesium 2.27 1.60 - 2.40 mg/dL   Transthoracic Echo (TTE) Complete   Result Value Ref Range    AV pk jag 1.13 m/s    AV mn grad 2.6 mmHg    LVOT diam 1.95 cm    MV E/A ratio 0.47     LA vol index A/L 16.2 ml/m2    Tricuspid annular plane systolic excursion 2.1 cm    LV EF 61 %    RV free wall pk S' 12.00 cm/s    LVIDd 3.30 cm    RVSP 25.5 mmHg    Aortic Valve Area by Continuity of VTI 2.41 cm2    Aortic  Valve Area by Continuity of Peak Velocity 2.37 cm2    AV pk grad 5.1 mmHg    LV A4C EF 59.6    CBC   Result Value Ref Range    WBC 6.0 4.4 - 11.3 x10*3/uL    nRBC 0.0 0.0 - 0.0 /100 WBCs    RBC 3.15 (L) 4.50 - 5.90 x10*6/uL    Hemoglobin 10.0 (L) 13.5 - 17.5 g/dL    Hematocrit 29.7 (L) 41.0 - 52.0 %    MCV 94 80 - 100 fL    MCH 31.7 26.0 - 34.0 pg    MCHC 33.7 32.0 - 36.0 g/dL    RDW 12.1 11.5 - 14.5 %    Platelets 318 150 - 450 x10*3/uL   Renal Function Panel   Result Value Ref Range    Glucose 92 74 - 99 mg/dL    Sodium 139 136 - 145 mmol/L    Potassium 3.1 (L) 3.5 - 5.3 mmol/L    Chloride 104 98 - 107 mmol/L    Bicarbonate 28 21 - 32 mmol/L    Anion Gap 10 10 - 20 mmol/L    Urea Nitrogen 12 6 - 23 mg/dL    Creatinine 0.83 0.50 - 1.30 mg/dL    eGFR >90 >60 mL/min/1.73m*2    Calcium 7.9 (L) 8.6 - 10.6 mg/dL    Phosphorus 1.8 (L) 2.5 - 4.9 mg/dL    Albumin 3.1 (L) 3.4 - 5.0 g/dL   Magnesium   Result Value Ref Range    Magnesium 1.92 1.60 - 2.40 mg/dL     Transthoracic Echo (TTE) Complete    Result Date: 7/11/2024   [facility], [address] [place] [address]                Tel [phone] and Fax [phone] TRANSTHORACIC ECHOCARDIOGRAM REPORT  Patient Name:      REGINE Valdes Physician:    [id number] Martinez MD Study Date:        7/11/2024            Ordering Provider:    [id number] GERBER ARMENTA MRN/PID:           [id number]             Fellow: Accession#:        [id number]         Nurse: Date of Birth/Age: 1965 / 58 years Sonographer:          ANOOP Chavez RDCS Gender:            M                    Additional Staff: Height:            167.64 cm            Admit Date: Weight:            44.45 kg             Admission Status:     Outpatient BSA / BMI:         1.48 m2 / 15.82       Encounter#:           [id number]                    kg/m2 Blood Pressure:    141/92 mmHg          Department Location:  [facility] Study Type:    TRANSTHORACIC ECHO (TTE) COMPLETE Diagnosis/ICD: Syncope-R55 Indication:    Syncope CPT Code:      Echo Complete w Full [id number] Patient History: Pertinent History: HTN and Syncope. Study Detail: The following Echo studies were performed: 2D, M-Mode, Doppler and               color flow.  PHYSICIAN INTERPRETATION: Left Ventricle: Left ventricular ejection fraction is normal, calculated by Aranda's biplane at 61%. Estimated left ventricular mass is normal. There are no regional wall motion abnormalities. The left ventricular cavity size is normal. There is normal left ventricular posterior wall thickness. Spectral Doppler shows a normal pattern of left ventricular diastolic filling. Left Atrium: The left atrium is normal in size. Right Ventricle: The right ventricle is normal in size. There is normal right ventricular global systolic function. Right Atrium: The right atrium is normal in size. The right atrium has a prominent Eustachean valve. Aortic Valve: The aortic valve is probably trileaflet. There is no aortic valve cusp calcification noted. There is no aortic valve thickening. The aortic valve dimensionless index is 0.81. There is mild aortic valve regurgitation. The peak instantaneous gradient of the aortic valve is 5.1 mmHg. The mean gradient of the aortic valve is 2.6 mmHg.  ms. Mitral Valve: The mitral valve is normal in structure. There is no evidence of mitral valve regurgitation. Tricuspid Valve: The tricuspid valve is structurally normal. There is mild tricuspid regurgitation. The Doppler estimated RVSP is within normal limits at 25.5 mmHg. Pulmonic Valve: The pulmonic valve is structurally normal. There is mild pulmonic valve regurgitation. Pericardium: There is no pericardial effusion noted. Aorta: The aortic root is normal. Systemic  Veins: The inferior vena cava size appears small. There is IVC inspiratory collapse greater than 50%. In comparison to the previous echocardiogram(s): There are no prior studies on this patient for comparison purposes.  CONCLUSIONS:  1. Left ventricular ejection fraction is normal, calculated by Aranda's biplane at 61%.  2. There is normal right ventricular global systolic function.  3. Mild aortic valve regurgitation.  4. RVSP within normal limits.  5. The inferior vena cava size appears small. QUANTITATIVE DATA SUMMARY: 2D MEASUREMENTS:                          Normal Ranges: Ao Root d:     3.40 cm   (2.0-3.7cm) LAs:           2.22 cm   (2.7-4.0cm) RVIDd:         2.75 cm   (0.9-3.6cm) IVSd:          0.84 cm   (0.6-1.1cm) LVPWd:         0.57 cm   (0.6-1.1cm) LVIDd:         3.30 cm   (3.9-5.9cm) LVIDs:         1.72 cm LV Mass Index: 46.6 g/m2 LV % FS        47.9 % LA VOLUME:                               Normal Ranges: LA Vol A4C:        30.4 ml    (22+/-6mL/m2) LA Vol A2C:        14.1 ml LA Vol BP:         23.9 ml LA Vol Index A4C:  20.6ml/m2 LA Vol Index A2C:  9.5 ml/m2 LA Vol Index BP:   16.2 ml/m2 LA Area A4C:       10.7 cm2 LA Area A2C:       6.3 cm2 LA Major Axis A4C: 3.2 cm LA Major Axis A2C: 2.4 cm LA Volume Index:   16.0 ml/m2 M-MODE MEASUREMENTS:                  Normal Ranges: Ao Root: 2.80 cm (2.0-3.7cm) LAs:     3.18 cm (2.7-4.0cm) AORTA MEASUREMENTS:                      Normal Ranges: Ao Sinus, d: 3.68 cm (2.1-3.5cm) Asc Ao, d:   3.50 cm (2.1-3.4cm) LV SYSTOLIC FUNCTION BY 2D PLANIMETRY (MOD):                      Normal Ranges: EF-A4C View:    60 % (>=55%) EF-A2C View:    61 % EF-Biplane:     61 % LV EF Reported: 61 % LV DIASTOLIC FUNCTION:                               Normal Ranges: MV Peak E:        0.32 m/s    (0.7-1.2 m/s) MV Peak A:        0.68 m/s    (0.42-0.7 m/s) E/A Ratio:        0.47        (1.0-2.2) MV e'             0.095 m/s   (>8.0) MV lateral e'     0.13 m/s MV medial e'      0.06  m/s MV A Dur:         117.65 msec E/e' Ratio:       3.39        (<8.0) a'                0.08 m/s PulmV Sys Jesus:    42.38 cm/s PulmV Pulido Jesus:   21.62 cm/s PulmV S/D Jesus:    1.96 PulmV A Revs Jesus: 31.49 cm/s PulmV A Revs Dur: 76.12 msec MITRAL VALVE:                 Normal Ranges: MV DT: 106 msec (150-240msec) AORTIC VALVE:                                   Normal Ranges: AoV Vmax:                1.13 m/s (<=1.7m/s) AoV Peak P.1 mmHg (<20mmHg) AoV Mean P.6 mmHg (1.7-11.5mmHg) LVOT Max Jesus:            0.89 m/s (<=1.1m/s) AoV VTI:                 18.45 cm (18-25cm) LVOT VTI:                14.89 cm LVOT Diameter:           1.95 cm  (1.8-2.4cm) AoV Area, VTI:           2.41 cm2 (2.5-5.5cm2) AoV Area,Vmax:           2.37 cm2 (2.5-4.5cm2) AoV Dimensionless Index: 0.81 AORTIC INSUFFICIENCY: AI Vmax:       4.60 m/s AI Half-time:  798 msec AI Decel Time: 2751 msec AI Decel Rate: 167.82 cm/s2  RIGHT VENTRICLE: RV Basal 3.40 cm RV Mid   2.80 cm RV Major 6.3 cm TAPSE:   21.0 mm RV s'    0.12 m/s TRICUSPID VALVE/RVSP:                             Normal Ranges: Peak TR Velocity: 2.37 m/s Est. RA Pressure: 3 mmHg RV Syst Pressure: 25.5 mmHg (< 30mmHg) IVC Diam:         0.07 cm PULMONIC VALVE:                         Normal Ranges: PV Accel Time: 95 msec  (>120ms) PV Max Jesus:    0.7 m/s  (0.6-0.9m/s) PV Max P.9 mmHg Pulmonary Veins: PulmV A Revs Dur: 76.12 msec PulmV A Revs Jesus: 31.49 cm/s PulmV Pulido Jesus:   21.62 cm/s PulmV S/D Jesus:    1.96 PulmV Sys Jesus:    42.38 cm/s AORTA: Asc Ao Diam 3.53 cm  [id number] Killian Martinez MD Electronically signed on 2024 at 5:22:48 PM  ** Final **     ECG 12 lead    Result Date: 2024  Sinus tachycardia Left axis deviation Inferior infarct , age undetermined Prolonged QT Abnormal ECG When compared with ECG of 2024 05:35, Significant changes have occurred See ED provider note for full interpretation and clinical correlation Confirmed by Joanie  Sonja ([id number]) on 7/11/2024 5:55:45 AM    ECG 12 lead    Result Date: 7/11/2024  Normal sinus rhythm Left axis deviation Prolonged QT Abnormal ECG See ED provider note for full interpretation and clinical correlation Confirmed by Sonja Nair ([id number]) on 7/11/2024 5:35:16 AM    CT abdomen pelvis w IV contrast    Result Date: 7/10/2024  Interpreted By:  Dilshad Alvarado and Stevens Alex STUDY: CT ABDOMEN PELVIS W IV CONTRAST;  7/10/2024 2:54 pm   INDICATION: Signs/Symptoms:abd pain, hx of perforated ulcer. n/v/d. Per EMR, 58-year-old male with past medical history of hypertension BPH as well as remote history of perforated gastric ulcer presents to the ED with nausea vomiting diarrhea diffuse abdominal pain as well as generalized malaise. States he has been having these symptoms are proximally 2 weeks. Endorses having significant weight loss of approximately 45 pounds for the past 2-3 weeks. He states that the no small amount of bright red blood in emesis.   COMPARISON: CT abdomen and pelvis 07/02/2024.   ACCESSION NUMBER(S): [id number]   ORDERING CLINICIAN: MARCIA FALCON   TECHNIQUE: CT of the abdomen and pelvis was performed.  Standard contiguous axial images were obtained at 3 mm slice thickness through the abdomen and pelvis. Coronal and sagittal reconstructions at 3 mm slice thickness were performed.   80 ml of contrast Omnipaque 350 were administered intravenously without immediate complication.   FINDINGS: LOWER CHEST: Mild central lobar emphysema. The heart is normal in size without pericardial effusion. No pleural effusion is present. Redemonstration of circumferential mural thickening of the distal esophagus with submucosal edema.   ABDOMEN:   LIVER: Liver is diffusely enlarged with fatty infiltration. No focal lesions.   BILE DUCTS: The intrahepatic and extrahepatic ducts are not dilated.   GALLBLADDER: No calcified stones. No wall thickening.   PANCREAS: The pancreas appears unremarkable.   SPLEEN:  Within normal limits.   ADRENAL GLANDS: Bilateral adrenal glands appear normal.   KIDNEYS AND URETERS: Re-demonstration of small hypodense bilateral cortical cysts. Kidneys are normal in size enhance symmetrically. No hydroureteronephrosis or nephroureterolithiasis is identified.   PELVIS:   BLADDER: The urinary bladder is decompressed, limited for evaluation.   REPRODUCTIVE ORGANS: The prostate is not enlarged.   BOWEL: Re-demonstration of gastric, small and large bowel mucosal hyperemia. There is in exophytic mass within the fundus of the stomach measuring approximately 2 cm x 1.5 cm as evidenced on series 601, image 38. Increased enhancement along the wall of the lower rectum and anal, which is nonspecific but may represent hemorrhoids. Otherwise, no evidence of bowel wall thickening dilation. Interval decrease in size of left inguinal hernia containing decompressed small bowel loops. Sigmoid diverticulosis without evidence of acute diverticulitis. Appendix is not visualized with no evidence of pericecal inflammatory changes.   VESSELS: Chronic infrarenal abdominal aortic atherosclerosis.   PERITONEUM/RETROPERITONEUM/LYMPH NODES: There is no free or loculated fluid collection, no free intraperitoneal air. The retroperitoneum appears normal.  No abdominopelvic lymphadenopathy is present.   BONES AND ABDOMINAL WALL: No suspicious osseous lesions are identified.  Left inguinal hernia as described above. 8 cm by 3 cm cystic mass along the left flank likely represents a benign lipoma.       1.  A 2.0 x 1.5 cm exophytic mass within the posterior wall of the gastric fundus, concerning for underlying neoplasm, possibly gastrointestinal stromal tumor (GIST). Recommend further evaluation via endoscopy. 2. Redemonstration of circumferential mural thickening of the distal esophagus with patulous esophagus, concerning for esophagitis. 3. Extensive sigmoid colonic diverticulosis without evidence of acute diverticulitis. 4.  Interval decrease in size of small bowel containing left inguinal hernia compared to 07/02/2024 CT. No evidence bowel wall thickening or obstruction. 5. Additional chronic findings as described above.     I personally reviewed the images/study and I agree with the findings as stated. This study was interpreted at [facility], [place], [place].   MACRO: Jose Conner discussed the significance and urgency of this critical finding by telephone with Gonzalo Dodson DO on 7/10/2024 at 3:53 pm. (**-RCF-**) Findings:  See findings.   Signed by: Dilshad Alvarado 7/10/2024 4:10 PM Dictation workstation:   [id number]    CT angio chest for pulmonary embolism    Result Date: 7/10/2024  Interpreted By:  Mariel Alicia, STUDY: CT ANGIO CHEST FOR PULMONARY EMBOLISM;  7/10/2024 2:54 pm   INDICATION: Signs/Symptoms:syncope, hypoxia, wt loss.   COMPARISON: None   ACCESSION NUMBER(S): [id number]   ORDERING CLINICIAN: MARCIA FALCON   TECHNIQUE: Helical data acquisition of the chest was obtained after intravenous administration of 80 cc of Omnipaque 350 , as per PE protocol. Images were reformatted in coronal and sagittal planes. Axial and coronal maximum intensity projection (MIP) images were created and reviewed.   FINDINGS: POTENTIAL LIMITATIONS OF THE STUDY: None   HEART AND VESSELS: There are no discrete filling defects within main pulmonary artery and its branches to suggest acute pulmonary embolism. Main pulmonary artery and its branches are normal in caliber.   The thoracic aorta normal in course and caliber. No coronary artery calcifications are seen. Please note, the study is not optimized for evaluation of coronary arteries.   The cardiac chambers are not enlarged.   There is no pericardial effusion seen.   MEDIASTINUM AND ARNAUD, LOWER NECK AND AXILLA: The visualized thyroid gland is within normal limits. No evidence of thoracic lymphadenopathy by CT criteria. Esophagus is patulous with  some ingested material.   LUNGS AND AIRWAYS: The trachea and central airways are patent. No endobronchial lesion is seen.   The bilateral lungs are clear without evidence of focal consolidation, pleural effusion, or pneumothorax.   Mild upper lung predominant emphysema   4 mm left upper lobe nodule, image 61/352.     UPPER ABDOMEN: The visualized subdiaphragmatic structures demonstrate no remarkable findings.       CHEST WALL AND OSSEOUS STRUCTURES: Chest wall is within normal limits. No acute osseous pathology.There are no suspicious osseous lesions.   Partially imaged fat density lesion in the left flank measuring at least 7.2 x 3.3 cm, likely a lipoma.       1. No evidence of acute pulmonary embolism. 2. Mild upper lung predominant emphysema. 3. 4 mm left upper lobe nodule as described above. Instructions:  No further follow-up is required, however, if the patient has high risk factors for primary lung malignancy, follow-up noncontrast CT scan chest in 12 months may be obtained. (Manuel Pope et al., Guidelines for management of incidental pulmonary nodules detected on CT images: From the Fleischner Society 2017, Radiology. 2017 Jul;284 (1):228-243.) [id number].ACR.IF.1 4. Patulous esophagus with debris and ingested material. Correlate with motility disorder and reflux. 5. Partially imaged fat density lesion in the left flank, likely a lipoma.   MACRO: None   Signed by: Mariel Hatch 7/10/2024 3:25 PM Dictation workstation:   [id number]    CT head wo IV contrast    Result Date: 7/10/2024  Interpreted By:  Britt Rubi, STUDY: CT HEAD WO IV CONTRAST   INDICATION: Signs/Symptoms:syncope, hit head   COMPARISON:   Head CT 09/23/2023   ACCESSION NUMBER(S): [id number]   ORDERING CLINICIAN: MARCIA FALCON   TECHNIQUE: CT of the head was performed without the administration of intravenous contrast.   FINDINGS: BRAIN PARENCHYMA: No acute intraparenchymal hemorrhage, acute territorial infarct or masses.    MIDLINE SHIFT OR HERNIATION: No midline shift or herniation.   VENTRICLES: No hydrocephalus.   DURAL VENOUS SINUSES: No hyperdensity to suggest acute thrombus.   EXTRA-AXIAL SPACES (epidural, subdural, subarachnoid spaces and basal cisterns): No collections.   VISUALIZED ORBITS: Normal.   VISUALIZED PARANASAL SINUSES AND MASTOID AIR CELLS: Paranasal sinuses are clear. Tympanomastoid cavities are aerated.   SOFT TISSUES: Normal.   OSSEOUS STRUCTURES: Normal.       No acute intracranial abnormality.   Signed by: Britt Rubi 7/10/2024 3:08 PM Dictation workstation:   [id number]    XR chest 1 view    Result Date: 7/10/2024  Interpreted By:  Rajesh Gill, STUDY: XR CHEST 1 VIEW;  7/10/2024 2:36 pm   INDICATION: Signs/Symptoms:hypoxia.   COMPARISON: 09/23/2023   ACCESSION NUMBER(S): [id number]   ORDERING CLINICIAN: MARCIA FALCON   FINDINGS:         CARDIOMEDIASTINAL SILHOUETTE: Cardiomediastinal silhouette is normal in size and configuration.   LUNGS: Lungs are hyperinflated. There is no consolidation or effusion. There is no edema   ABDOMEN: No remarkable upper abdominal findings.   BONES: No acute osseous changes.       1. Hyperinflated lungs without acute abnormality       MACRO: None   Signed by: Rajesh Gill 7/10/2024 2:54 PM Dictation workstation:   [id number]             Malnutrition Diagnosis Status: New  Malnutrition Diagnosis: Severe malnutrition related to chronic disease or condition  As Evidenced by: pt with a decrease in PO intake for the last two months with at least a 27% weight loss (possibly more) with noted severe fat and muscle loss on physical exam  I agree with the dietitian's malnutrition diagnosis.    CT angio chest for pulmonary embolism 07/10/2024    Narrative  Interpreted By:  Mariel Alicia,  STUDY:  CT ANGIO CHEST FOR PULMONARY EMBOLISM;  7/10/2024 2:54 pm    INDICATION:  Signs/Symptoms:syncope, hypoxia, wt loss.    COMPARISON:  None    ACCESSION  NUMBER(S):  [id number]    ORDERING CLINICIAN:  MARCIA FALCON    TECHNIQUE:  Helical data acquisition of the chest was obtained after intravenous  administration of 80 cc of Omnipaque 350 , as per PE protocol. Images  were reformatted in coronal and sagittal planes. Axial and coronal  maximum intensity projection (MIP) images were created and reviewed.    FINDINGS:  POTENTIAL LIMITATIONS OF THE STUDY: None    HEART AND VESSELS:  There are no discrete filling defects within main pulmonary artery  and its branches to suggest acute pulmonary embolism. Main pulmonary  artery and its branches are normal in caliber.    The thoracic aorta normal in course and caliber.  No coronary artery calcifications are seen. Please note, the study is  not optimized for evaluation of coronary arteries.    The cardiac chambers are not enlarged.    There is no pericardial effusion seen.    MEDIASTINUM AND ARNAUD, LOWER NECK AND AXILLA:  The visualized thyroid gland is within normal limits.  No evidence of thoracic lymphadenopathy by CT criteria.  Esophagus is patulous with some ingested material.    LUNGS AND AIRWAYS:  The trachea and central airways are patent. No endobronchial lesion  is seen.    The bilateral lungs are clear without evidence of focal  consolidation, pleural effusion, or pneumothorax.    Mild upper lung predominant emphysema    4 mm left upper lobe nodule, image 61/352.      UPPER ABDOMEN:  The visualized subdiaphragmatic structures demonstrate no remarkable  findings.        CHEST WALL AND OSSEOUS STRUCTURES:  Chest wall is within normal limits.  No acute osseous pathology.There are no suspicious osseous lesions.    Partially imaged fat density lesion in the left flank measuring at  least 7.2 x 3.3 cm, likely a lipoma.    Impression  1. No evidence of acute pulmonary embolism.  2. Mild upper lung predominant emphysema.  3. 4 mm left upper lobe nodule as described above.  Instructions:  No further follow-up is required,  however, if the  patient has high risk factors for primary lung malignancy, follow-up  noncontrast CT scan chest in 12 months may be obtained. (Manuel Pope et al., Guidelines for management of incidental pulmonary  nodules detected on CT images: From the Fleischner Society 2017,  Radiology. 2017 Jul;284 (1):228-243.) REDDNER.ACR.IF.1  4. Patulous esophagus with debris and ingested material. Correlate  with motility disorder and reflux.  5. Partially imaged fat density lesion in the left flank, likely a  lipoma.    MACRO:  None    Signed by: Mariel Hatch 7/10/2024 3:25 PM  Dictation workstation:   [id number]      Assessment/Plan   Principal Problem:    Failure to thrive in adult    Joe Alaniz is a 58 y.o. with a hx of HTN, BPH, left inguinal hernia, etoh (quit may 2024) and PUD (required surgery in past many years ago) admitted with persistent N/V, 50lb Wt loss, and failure to thrive.     Neuro:  #Hx etoh use (last drink may 2024)  #Acute pain 2/2 tooth fracture/decay  - A&Ox3  - Pain reg: Tylenol PRN, Benzocaine-menthol lozenges prn, MBX mouth wash prn, and tramadol 50mg q6 hrs severe pain  - PT/OT/OOB  - Delirium Precautions    Dental:  #Tooth fracture vs decay  - Panorex completed, read pending   - Dental consult, recs appreciated         > Saw patient 7/11, follow up with recs   - OMFS consult, recs appreciated          > Seen today, does not feel any teeth need removed while inpatient.  Recommending follow up as o/p   - Pain control as above     CV:  #Hx HTN  #Syncopal episode likely from dehydration   #Prolonged Qtc, 636 on admission  - HDS  - Continue home Amlodipine   - TTE 7/11: Left ventricular ejection fraction is normal, calculated by Aranda's biplane at 61%.  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 C/f esophagitis.  Diverticulosis without acute diverticulitis.  Left inguinal hernial without obstruction, decreased in size when compared to CT on 7/2  - Diet: NPO  - Continue PPI   - CA 19-9 Pending   - GI consulted         > Plan for EGD today  - Replace K and phos, check afternoon RFP.   - Daily RFP/Mag    Renal:  #Hx BPH  #Vit D Deficiency  - Continue IVF  - Continue home Flomax  - Continue Vit D supplementation weekly     Endo: No acute issues  - TSH WNL  - HgbA1C 5.8 7/10/24     Heme:  #Normocytic anemia  - SubQ heparin  - Daily CBC     ID:  #Elevated Lactate, sepsis r/o.    - Lactate resolved with IVF  - Micro: 7/10 BC x2 NGTD, HIV Neg, Covid Neg (7/2- HIV Neg, Syphilis Total Ab neg, Group A strep neg, Flu/RSV/Covid Neg, HCV neg).   - Monitor off atb        PPX:  DVT: scds   GI: PPI    Access/Lines: PIV    Code Status: Full code   NOK: Too Alaniz, [phone]    Dispo: Continue care in SDU    Pt discussed with Dr. Sadler, seen and examined. All labs, VS and previous plan of care reviewed.        Ayaz Holly, APRN-CNP

## 2024-07-12 NOTE — PROGRESS NOTES
[facility]  [facility] [employer]  CONSULT FOLLOW-UP     Reason For Consult  New mass as seen on CT concerning for GIST    History Of Present Illness  Joe Alaniz is a 58 y.o. male presenting with with a past medical history of HTN, BPH, PUD s/p surgery as a teenager, nephrolithiasis, left inguinal hernia admitted on 7/10/2024 with nausea, vomiting, and an episode of diarrhea.     On admission, patient was hypertensive to 180s/110s for which he received PRN pushes of hydralazine with improvement- per patient he missed his AM doses of anti-HTN medications.  Additionally, the patient was found to be hypoxic to 88% on RA. CXR negative for acute findings, CTPE negative for PE. Labs were significant for CBC: Hgb 12.5 (BL ~15 but Hgb 13.2 early July); RFP: K+ 2.1 Cr 1.63 (BL ~.60) for which he received IV repletion; LFTs bili 1.4; lactate 4.3 for which he received 2L with resolution to 1.8. Underwent CT head due to history of the syncopal episode, CT head was unremarkable for anything acute. CT abdomen/pelvis demonstrated a 2.0 x 1.5 cm exophytic mass at the posterior wall of the gastric fundus concerning for GIST, circumferential mural thickening of the distal esophagus, sigmoid diverticulosis, and a left inguinal hernia. Of note, patient underwent a CT ab/pelvis 07/02 which did not mention this mass but gastric and small bowel/large bowel hyperemia and esophagitis, though present on review. GI consulted for this new exophytic mass noted on CT.    SUBJECTIVE   NAEO. Denies abdominal pain, N/V at this point. Endorses improvement of his left sided hernia pain. No BM since Wednesday.    Labs notable for a low K+, Phos. Hgb also downtrending 15.8->12.5->10.0 this AM.    EGD with EUS this afternoon, mass suggestive of a GIST on EGD/EUS, additionally esophagitis, gastritis present. No active bleeding.  EXAM   Vitals:  Vitals:    07/12/24 0800   BP:    Pulse:    Resp:    Temp: 36.8 °C  (98.2 °F)   SpO2:        I/O last 3 completed shifts:  In: 2074.8 (46.6 mL/kg) [P.O.:270; I.V.:459.2 (10.3 mL/kg); IV Piggyback:1345.7]  Out: 480 (10.8 mL/kg) [Urine:480 (0.3 mL/kg/hr)]  Weight: 44.5 kg   No intake/output data recorded.    Physical Exam  General: cachectic, no acute distress  HEENT: PERRLA, EOM intact, no scleral icterus, moist MM  Respiratory: CTA bilaterally, normal work of breathing  Cardiovascular: RRR, no murmurs/rubs/gallops  Abdomen: Soft, nontender, nondistended, bowel sounds present. Small left inguinal hernia present that is tender, able to be reduced  Extremities: no edema  Neuro: alert and oriented, CNII-XII grossly intact, moves all 4 extremities with no focal deficits    OBJECTIVE                                                                              Medications   Medications:  amLODIPine, 10 mg, oral, Daily  [START ON 7/14/2024] ergocalciferol, 50,000 Units, oral, Every Sunday  folic acid, 1 mg, oral, Daily  [Held by provider] heparin (porcine), 5,000 Units, subcutaneous, q8h BEE  pantoprazole, 40 mg, intravenous, BID  [Held by provider] polyethylene glycol, 17 g, oral, Daily  potassium chloride CR, 20 mEq, oral, Once  potassium phosphate, 21 mmol, intravenous, Once  thiamine, 100 mg, intravenous, Daily      D5 % and 0.9 % sodium chloride, 50 mL/hr, Last Rate: 50 mL/hr (07/11/24 1834)      PRN medications: acetaminophen, benzocaine-menthol, hydrALAZINE, lidocaine-diphenhydraMINE-Maalox 1:1:1, ondansetron, traMADol                                                                           Labs   Labs:  Most recent labs:   Results from last 7 days   Lab Units 07/12/24  0607 07/11/24  0623 07/10/24  1400   WBC AUTO x10*3/uL 6.0 6.2 7.1   HEMOGLOBIN g/dL 10.0* 12.5* 15.8   HEMATOCRIT % 29.7* 37.0* 43.6   PLATELETS AUTO x10*3/uL 318 367 429     Results from last 7 days   Lab Units 07/12/24  0607 07/11/24  1417 07/11/24  0623 07/11/24  0007   CO2 mmol/L 28 31 27 28   ANION GAP mmol/L  "10 9* 15 16   BUN mg/dL 12 19 22 23   CREATININE mg/dL 0.83 1.10 1.30 1.31*   GLUCOSE mg/dL 92 124* 130* 113*   CALCIUM mg/dL 7.9* 8.5* 8.7 8.5*   MAGNESIUM mg/dL 1.92 2.27  --  1.78   PHOSPHORUS mg/dL 1.8* 1.6* 2.8 4.4      Results from last 7 days   Lab Units 07/10/24  1400   ALT U/L 28   AST U/L 28   ALK PHOS U/L 67            No lab exists for component: \"PT\"      Results from last 7 days   Lab Units 07/10/24  1359   POCT PH, VENOUS pH 7.53*   POCT PCO2, VENOUS mm Hg 42     Results from last 7 days   Lab Units 07/11/24  0624 07/11/24  0007 07/10/24  1750   LACTATE mmol/L 1.8 2.0 2.2*                                                                                Imaging   Imaging:  All images:   Transthoracic Echo (TTE) Complete    Result Date: 7/11/2024   [facility], [address] [place] [address]                Tel [phone] and Fax [phone] TRANSTHORACIC ECHOCARDIOGRAM REPORT  Patient Name:      REGINE Valdes Physician:    [id number] Martinez MD Study Date:        7/11/2024            Ordering Provider:    [id number] GERBER ARMENTA MRN/PID:           [id number]             Fellow: Accession#:        [id number]         Nurse: Date of Birth/Age: 1965 / 58 years Sonographer:          ANOOP Chavez RDCS Gender:            M                    Additional Staff: Height:            167.64 cm            Admit Date: Weight:            44.45 kg             Admission Status:     Outpatient BSA / BMI:         1.48 m2 / 15.82      Encounter#:           [id number]                    kg/m2 Blood Pressure:    141/92 mmHg          Department Location:  [facility] Echo Lab Study Type:    TRANSTHORACIC ECHO (TTE) COMPLETE Diagnosis/ICD: Syncope-R55 Indication:    Syncope CPT Code:      Echo " Complete w Full [id number] Patient History: Pertinent History: HTN and Syncope. Study Detail: The following Echo studies were performed: 2D, M-Mode, Doppler and               color flow.  PHYSICIAN INTERPRETATION: Left Ventricle: Left ventricular ejection fraction is normal, calculated by Aranda's biplane at 61%. Estimated left ventricular mass is normal. There are no regional wall motion abnormalities. The left ventricular cavity size is normal. There is normal left ventricular posterior wall thickness. Spectral Doppler shows a normal pattern of left ventricular diastolic filling. Left Atrium: The left atrium is normal in size. Right Ventricle: The right ventricle is normal in size. There is normal right ventricular global systolic function. Right Atrium: The right atrium is normal in size. The right atrium has a prominent Eustachean valve. Aortic Valve: The aortic valve is probably trileaflet. There is no aortic valve cusp calcification noted. There is no aortic valve thickening. The aortic valve dimensionless index is 0.81. There is mild aortic valve regurgitation. The peak instantaneous gradient of the aortic valve is 5.1 mmHg. The mean gradient of the aortic valve is 2.6 mmHg.  ms. Mitral Valve: The mitral valve is normal in structure. There is no evidence of mitral valve regurgitation. Tricuspid Valve: The tricuspid valve is structurally normal. There is mild tricuspid regurgitation. The Doppler estimated RVSP is within normal limits at 25.5 mmHg. Pulmonic Valve: The pulmonic valve is structurally normal. There is mild pulmonic valve regurgitation. Pericardium: There is no pericardial effusion noted. Aorta: The aortic root is normal. Systemic Veins: The inferior vena cava size appears small. There is IVC inspiratory collapse greater than 50%. In comparison to the previous echocardiogram(s): There are no prior studies on this patient for comparison purposes.  CONCLUSIONS:  1. Left ventricular  ejection fraction is normal, calculated by Aranda's biplane at 61%.  2. There is normal right ventricular global systolic function.  3. Mild aortic valve regurgitation.  4. RVSP within normal limits.  5. The inferior vena cava size appears small. QUANTITATIVE DATA SUMMARY: 2D MEASUREMENTS:                          Normal Ranges: Ao Root d:     3.40 cm   (2.0-3.7cm) LAs:           2.22 cm   (2.7-4.0cm) RVIDd:         2.75 cm   (0.9-3.6cm) IVSd:          0.84 cm   (0.6-1.1cm) LVPWd:         0.57 cm   (0.6-1.1cm) LVIDd:         3.30 cm   (3.9-5.9cm) LVIDs:         1.72 cm LV Mass Index: 46.6 g/m2 LV % FS        47.9 % LA VOLUME:                               Normal Ranges: LA Vol A4C:        30.4 ml    (22+/-6mL/m2) LA Vol A2C:        14.1 ml LA Vol BP:         23.9 ml LA Vol Index A4C:  20.6ml/m2 LA Vol Index A2C:  9.5 ml/m2 LA Vol Index BP:   16.2 ml/m2 LA Area A4C:       10.7 cm2 LA Area A2C:       6.3 cm2 LA Major Axis A4C: 3.2 cm LA Major Axis A2C: 2.4 cm LA Volume Index:   16.0 ml/m2 M-MODE MEASUREMENTS:                  Normal Ranges: Ao Root: 2.80 cm (2.0-3.7cm) LAs:     3.18 cm (2.7-4.0cm) AORTA MEASUREMENTS:                      Normal Ranges: Ao Sinus, d: 3.68 cm (2.1-3.5cm) Asc Ao, d:   3.50 cm (2.1-3.4cm) LV SYSTOLIC FUNCTION BY 2D PLANIMETRY (MOD):                      Normal Ranges: EF-A4C View:    60 % (>=55%) EF-A2C View:    61 % EF-Biplane:     61 % LV EF Reported: 61 % LV DIASTOLIC FUNCTION:                               Normal Ranges: MV Peak E:        0.32 m/s    (0.7-1.2 m/s) MV Peak A:        0.68 m/s    (0.42-0.7 m/s) E/A Ratio:        0.47        (1.0-2.2) MV e'             0.095 m/s   (>8.0) MV lateral e'     0.13 m/s MV medial e'      0.06 m/s MV A Dur:         117.65 msec E/e' Ratio:       3.39        (<8.0) a'                0.08 m/s PulmV Sys Jesus:    42.38 cm/s PulmV Pulido Jesus:   21.62 cm/s PulmV S/D Jesus:    1.96 PulmV A Revs Jesus: 31.49 cm/s PulmV A Revs Dur: 76.12 msec MITRAL VALVE:                  Normal Ranges: MV DT: 106 msec (150-240msec) AORTIC VALVE:                                   Normal Ranges: AoV Vmax:                1.13 m/s (<=1.7m/s) AoV Peak P.1 mmHg (<20mmHg) AoV Mean P.6 mmHg (1.7-11.5mmHg) LVOT Max Jesus:            0.89 m/s (<=1.1m/s) AoV VTI:                 18.45 cm (18-25cm) LVOT VTI:                14.89 cm LVOT Diameter:           1.95 cm  (1.8-2.4cm) AoV Area, VTI:           2.41 cm2 (2.5-5.5cm2) AoV Area,Vmax:           2.37 cm2 (2.5-4.5cm2) AoV Dimensionless Index: 0.81 AORTIC INSUFFICIENCY: AI Vmax:       4.60 m/s AI Half-time:  798 msec AI Decel Time: 2751 msec AI Decel Rate: 167.82 cm/s2  RIGHT VENTRICLE: RV Basal 3.40 cm RV Mid   2.80 cm RV Major 6.3 cm TAPSE:   21.0 mm RV s'    0.12 m/s TRICUSPID VALVE/RVSP:                             Normal Ranges: Peak TR Velocity: 2.37 m/s Est. RA Pressure: 3 mmHg RV Syst Pressure: 25.5 mmHg (< 30mmHg) IVC Diam:         0.07 cm PULMONIC VALVE:                         Normal Ranges: PV Accel Time: 95 msec  (>120ms) PV Max Jesus:    0.7 m/s  (0.6-0.9m/s) PV Max P.9 mmHg Pulmonary Veins: PulmV A Revs Dur: 76.12 msec PulmV A Revs Jesus: 31.49 cm/s PulmV Pulido Jesus:   21.62 cm/s PulmV S/D Jeuss:    1.96 PulmV Sys Jesus:    42.38 cm/s AORTA: Asc Ao Diam 3.53 cm  [id number] Killian Martinez MD Electronically signed on 2024 at 5:22:48 PM  ** Final **     ECG 12 lead    Result Date: 2024  Sinus tachycardia Left axis deviation Inferior infarct , age undetermined Prolonged QT Abnormal ECG When compared with ECG of 2024 05:35, Significant changes have occurred See ED provider note for full interpretation and clinical correlation Confirmed by Sonja Nair ([id number]) on 2024 5:55:45 AM    ECG 12 lead    Result Date: 2024  Normal sinus rhythm Left axis deviation Prolonged QT Abnormal ECG See ED provider note for full interpretation and clinical correlation Confirmed by Sonja Nair ([id number]) on  7/11/2024 5:35:16 AM    CT abdomen pelvis w IV contrast    Result Date: 7/10/2024  Interpreted By:  Dilshad Alvarado and Stevens Alex STUDY: CT ABDOMEN PELVIS W IV CONTRAST;  7/10/2024 2:54 pm   INDICATION: Signs/Symptoms:abd pain, hx of perforated ulcer. n/v/d. Per EMR, 58-year-old male with past medical history of hypertension BPH as well as remote history of perforated gastric ulcer presents to the ED with nausea vomiting diarrhea diffuse abdominal pain as well as generalized malaise. States he has been having these symptoms are proximally 2 weeks. Endorses having significant weight loss of approximately 45 pounds for the past 2-3 weeks. He states that the no small amount of bright red blood in emesis.   COMPARISON: CT abdomen and pelvis 07/02/2024.   ACCESSION NUMBER(S): [id number]   ORDERING CLINICIAN: MARCIA FALCON   TECHNIQUE: CT of the abdomen and pelvis was performed.  Standard contiguous axial images were obtained at 3 mm slice thickness through the abdomen and pelvis. Coronal and sagittal reconstructions at 3 mm slice thickness were performed.   80 ml of contrast Omnipaque 350 were administered intravenously without immediate complication.   FINDINGS: LOWER CHEST: Mild central lobar emphysema. The heart is normal in size without pericardial effusion. No pleural effusion is present. Redemonstration of circumferential mural thickening of the distal esophagus with submucosal edema.   ABDOMEN:   LIVER: Liver is diffusely enlarged with fatty infiltration. No focal lesions.   BILE DUCTS: The intrahepatic and extrahepatic ducts are not dilated.   GALLBLADDER: No calcified stones. No wall thickening.   PANCREAS: The pancreas appears unremarkable.   SPLEEN: Within normal limits.   ADRENAL GLANDS: Bilateral adrenal glands appear normal.   KIDNEYS AND URETERS: Re-demonstration of small hypodense bilateral cortical cysts. Kidneys are normal in size enhance symmetrically. No hydroureteronephrosis or  nephroureterolithiasis is identified.   PELVIS:   BLADDER: The urinary bladder is decompressed, limited for evaluation.   REPRODUCTIVE ORGANS: The prostate is not enlarged.   BOWEL: Re-demonstration of gastric, small and large bowel mucosal hyperemia. There is in exophytic mass within the fundus of the stomach measuring approximately 2 cm x 1.5 cm as evidenced on series 601, image 38. Increased enhancement along the wall of the lower rectum and anal, which is nonspecific but may represent hemorrhoids. Otherwise, no evidence of bowel wall thickening dilation. Interval decrease in size of left inguinal hernia containing decompressed small bowel loops. Sigmoid diverticulosis without evidence of acute diverticulitis. Appendix is not visualized with no evidence of pericecal inflammatory changes.   VESSELS: Chronic infrarenal abdominal aortic atherosclerosis.   PERITONEUM/RETROPERITONEUM/LYMPH NODES: There is no free or loculated fluid collection, no free intraperitoneal air. The retroperitoneum appears normal.  No abdominopelvic lymphadenopathy is present.   BONES AND ABDOMINAL WALL: No suspicious osseous lesions are identified.  Left inguinal hernia as described above. 8 cm by 3 cm cystic mass along the left flank likely represents a benign lipoma.       1.  A 2.0 x 1.5 cm exophytic mass within the posterior wall of the gastric fundus, concerning for underlying neoplasm, possibly gastrointestinal stromal tumor (GIST). Recommend further evaluation via endoscopy. 2. Redemonstration of circumferential mural thickening of the distal esophagus with patulous esophagus, concerning for esophagitis. 3. Extensive sigmoid colonic diverticulosis without evidence of acute diverticulitis. 4. Interval decrease in size of small bowel containing left inguinal hernia compared to 07/02/2024 CT. No evidence bowel wall thickening or obstruction. 5. Additional chronic findings as described above.     I personally reviewed the images/study  and I agree with the findings as stated. This study was interpreted at [facility], [place], [place].   MACRO: Jose Conner discussed the significance and urgency of this critical finding by telephone with Gnozalo Dodson DO on 7/10/2024 at 3:53 pm. (**-RCF-**) Findings:  See findings.   Signed by: Dilshad Alvarado 7/10/2024 4:10 PM Dictation workstation:   [id number]    CT angio chest for pulmonary embolism    Result Date: 7/10/2024  Interpreted By:  Mariel Alicia, STUDY: CT ANGIO CHEST FOR PULMONARY EMBOLISM;  7/10/2024 2:54 pm   INDICATION: Signs/Symptoms:syncope, hypoxia, wt loss.   COMPARISON: None   ACCESSION NUMBER(S): [id number]   ORDERING CLINICIAN: MARCIA FALCON   TECHNIQUE: Helical data acquisition of the chest was obtained after intravenous administration of 80 cc of Omnipaque 350 , as per PE protocol. Images were reformatted in coronal and sagittal planes. Axial and coronal maximum intensity projection (MIP) images were created and reviewed.   FINDINGS: POTENTIAL LIMITATIONS OF THE STUDY: None   HEART AND VESSELS: There are no discrete filling defects within main pulmonary artery and its branches to suggest acute pulmonary embolism. Main pulmonary artery and its branches are normal in caliber.   The thoracic aorta normal in course and caliber. No coronary artery calcifications are seen. Please note, the study is not optimized for evaluation of coronary arteries.   The cardiac chambers are not enlarged.   There is no pericardial effusion seen.   MEDIASTINUM AND ARNAUD, LOWER NECK AND AXILLA: The visualized thyroid gland is within normal limits. No evidence of thoracic lymphadenopathy by CT criteria. Esophagus is patulous with some ingested material.   LUNGS AND AIRWAYS: The trachea and central airways are patent. No endobronchial lesion is seen.   The bilateral lungs are clear without evidence of focal consolidation, pleural effusion, or pneumothorax.   Mild upper  lung predominant emphysema   4 mm left upper lobe nodule, image 61/352.     UPPER ABDOMEN: The visualized subdiaphragmatic structures demonstrate no remarkable findings.       CHEST WALL AND OSSEOUS STRUCTURES: Chest wall is within normal limits. No acute osseous pathology.There are no suspicious osseous lesions.   Partially imaged fat density lesion in the left flank measuring at least 7.2 x 3.3 cm, likely a lipoma.       1. No evidence of acute pulmonary embolism. 2. Mild upper lung predominant emphysema. 3. 4 mm left upper lobe nodule as described above. Instructions:  No further follow-up is required, however, if the patient has high risk factors for primary lung malignancy, follow-up noncontrast CT scan chest in 12 months may be obtained. (Manuel Pope et al., Guidelines for management of incidental pulmonary nodules detected on CT images: From the Fleischner Society 2017, Radiology. 2017 Jul;284 (1):228-243.) FLEMICKINER.ACR.IF.1 4. Patulous esophagus with debris and ingested material. Correlate with motility disorder and reflux. 5. Partially imaged fat density lesion in the left flank, likely a lipoma.   MACRO: None   Signed by: Mariel Hatch 7/10/2024 3:25 PM Dictation workstation:   [id number]    CT head wo IV contrast    Result Date: 7/10/2024  Interpreted By:  Britt Rubi, STUDY: CT HEAD WO IV CONTRAST   INDICATION: Signs/Symptoms:syncope, hit head   COMPARISON:   Head CT 09/23/2023   ACCESSION NUMBER(S): [id number]   ORDERING CLINICIAN: MARCIA FALCON   TECHNIQUE: CT of the head was performed without the administration of intravenous contrast.   FINDINGS: BRAIN PARENCHYMA: No acute intraparenchymal hemorrhage, acute territorial infarct or masses.   MIDLINE SHIFT OR HERNIATION: No midline shift or herniation.   VENTRICLES: No hydrocephalus.   DURAL VENOUS SINUSES: No hyperdensity to suggest acute thrombus.   EXTRA-AXIAL SPACES (epidural, subdural, subarachnoid spaces and basal cisterns): No  collections.   VISUALIZED ORBITS: Normal.   VISUALIZED PARANASAL SINUSES AND MASTOID AIR CELLS: Paranasal sinuses are clear. Tympanomastoid cavities are aerated.   SOFT TISSUES: Normal.   OSSEOUS STRUCTURES: Normal.       No acute intracranial abnormality.   Signed by: Britt Rubi 7/10/2024 3:08 PM Dictation workstation:   [id number]    XR chest 1 view    Result Date: 7/10/2024  Interpreted By:  Rajesh Gill, STUDY: XR CHEST 1 VIEW;  7/10/2024 2:36 pm   INDICATION: Signs/Symptoms:hypoxia.   COMPARISON: 09/23/2023   ACCESSION NUMBER(S): [id number]   ORDERING CLINICIAN: MARCIA FALCON   FINDINGS:         CARDIOMEDIASTINAL SILHOUETTE: Cardiomediastinal silhouette is normal in size and configuration.   LUNGS: Lungs are hyperinflated. There is no consolidation or effusion. There is no edema   ABDOMEN: No remarkable upper abdominal findings.   BONES: No acute osseous changes.       1. Hyperinflated lungs without acute abnormality       MACRO: None   Signed by: Rajesh Gill 7/10/2024 2:54 PM Dictation workstation:   [id number]                                                                           GI Procedures   Last EGD: No results found for this or any previous visit. No results found for this or any previous visit.  Last Colonoscopy: No results found for this or any previous visit. No results found for this or any previous visit.      ASSESSMENT / PLAN   Joe Alaniz is a 58 y.o. male with a past medical history of HTN, BPH, PUD s/p surgery as a teenager, nephrolithiasis, left inguinal hernia admitted on 7/10/2024 with N/V with an episode of coffee ground emesis, syncope. Patient found to be hypertensive to 180s/110s for which he received PRN pushes of hydralazine with improvement.  Additionally, the patient was found to be hypoxic to 88% on RA. CXR negative for acute findings, CTPE negative for PE. Labs were significant for hypokalemia, LEIF iso of hypovolemia. CT abdomen/pelvis demonstrated a 2.0 x 1.5  cm exophytic mass at the posterior wall of the gastric fundus concerning for GIST, circumferential mural thickening of the distal esophagus, sigmoid diverticulosis, and a left inguinal hernia.      Patient has N/V, dysphagia to solids and regurgitation with circumferential mural thickening of the distal esophagus on CT, most likely iso of esophagitis. Small episode of hematemesis, coffee ground emesis most likely in the setting of a Teresa-Mcmillan tear vs gastritis, EGD not suggestive of PUD. CT abdomen/pelvis demonstrated a mass concerning for GIST in the gastric fundus, EGD demonstrated similar findings, bx pending.          ASSESSMENT/PLAN:   -GI will follow up on biopsies taken during EGD 07/12 but EGD findings are suggestive of a GIST, most likely will just need outpatient follow-up in the GI clinic due to size (<3cm) but will wait on biopsy for need of any further intervention  -continue IV PPI BID  -recommend obtaining iron studies, further workup to evaluate patient's normocytic anemia  -will need colonoscopy as an outpatient as patient is overdue    Plan is not finalized till staffed with the attending physician Dr. Cardozo.    Thank you for this interesting consult. Gastroenterology will continue to follow.  -During weekday hours of 7am-5pm please do not hesitate to contact me on [employer] Chat or page [phone] if there are any further questions between the weekday hours of 7 AM - 5 PM.   -After hours, on weekends, and on holidays, please page [place] [phone]. Thank you.    Marion Galeano MD  PGY-2 Internal Medicine

## 2024-07-12 NOTE — ANESTHESIA PREPROCEDURE EVALUATION
Patient: Joe Alaniz    Procedure Information       Date/Time: 07/12/24 1300    Scheduled providers: Kip Ramos MD    Procedure: EGD    Location: [facility]            Relevant Problems   Anesthesia (within normal limits)      Cardiac  No familyhx MH   (+) HTN (hypertension)      Pulmonary (within normal limits)      Neuro (within normal limits)      GI (within normal limits)      /Renal   (+) Kidney stones, calcium oxalate      Liver (within normal limits)      Endocrine (within normal limits)   (+) Failure to thrive in adult      Hematology (within normal limits)  On Aspirin      Musculoskeletal (within normal limits)      HEENT (within normal limits)      ID (within normal limits)      Skin (within normal limits)      GYN (within normal limits)      58 y.o. male with a PMHx of HTN, BPH, left inguinal hernia and surgery for PUD many years ago presented to ED with nausea, vomiting and episode of runny diarrhea.  He states that he has had 3+ episodes of vomiting daily since May and endorses #50 weight loss since May.   Clinical information reviewed:   Tobacco  Allergies  Meds   Med Hx  Surg Hx   Fam Hx  Soc Hx        NPO Detail:  NPO/Void Status  Carbohydrate Drink Given Prior to Surgery? : N  Date of Last Liquid: 07/11/24  Time of Last Liquid: 2359  Date of Last Solid: 07/11/24  Time of Last Solid: 2359  Last Intake Type: Clear fluids  Time of Last Void: 1230         Physical Exam    Airway  Mallampati: III  TM distance: >3 FB     Cardiovascular - normal exam     Dental   Comments: intact   Pulmonary - normal exam     Abdominal        Vitals:    07/12/24 1235   BP: (!) 139/93   Pulse: 86   Resp: 16   Temp: 36.8 °C (98.2 °F)   SpO2: 99%     POC [id number]  History reviewed. No pertinent surgical history.  History reviewed. No pertinent past medical history.    Current Facility-Administered Medications:   •  acetaminophen (Tylenol) tablet 650 mg, 650 mg, oral, q4h JENNIFERN, Eneida RODRIGUEZ  JUNIOR Smalls, 650 mg at 07/12/24 0208  •  amLODIPine (Norvasc) tablet 10 mg, 10 mg, oral, Daily, JUNIOR Elder, 10 mg at 07/12/24 0843  •  benzocaine-menthol (Cepastat Sore Throat) lozenge 1 lozenge, 1 lozenge, Mouth/Throat, q2h PRN, JUNIOR Edwards  •  D5 % and 0.9 % sodium chloride infusion, 50 mL/hr, intravenous, Continuous, JUNIOR Elder, Last Rate: 50 mL/hr at 07/12/24 1109, 50 mL/hr at 07/12/24 1109  •  [START ON 7/14/2024] ergocalciferol (Vitamin D-2) capsule 50,000 Units, 50,000 Units, oral, Every Sunday, JUNIOR Elder  •  folic acid (Folvite) tablet 1 mg, 1 mg, oral, Daily, JUNIOR Elder, 1 mg at 07/12/24 0843  •  [Held by provider] heparin (porcine) injection 5,000 Units, 5,000 Units, subcutaneous, q8h BEE, JUNIOR Edwards  •  hydrALAZINE (Apresoline) injection 5 mg, 5 mg, intravenous, q4h PRN, JUNIOR Edwards, 5 mg at 07/11/24 0047  •  lidocaine-diphenhydrAMINE-Maalox 1:1:1 Magic Mouthwash, 10 mL, Swish & Spit, q4h PRN, JUNIOR Edwards  •  ondansetron (Zofran) injection 4 mg, 4 mg, intravenous, q4h PRN, JUNIOR Elder  •  pantoprazole (ProtoNix) injection 40 mg, 40 mg, intravenous, BID, JUNIOR Edwards, 40 mg at 07/12/24 0843  •  [Held by provider] polyethylene glycol (Glycolax, Miralax) packet 17 g, 17 g, oral, Daily, JUNIOR Edwards  •  potassium phosphates 21 mmol in dextrose 5% 250 mL IV, 21 mmol, intravenous, Once, Ayaz Holly, SMITHA-CNP, Last Rate: 42.8 mL/hr at 07/12/24 0845, 21 mmol at 07/12/24 0845  •  thiamine (Vitamin B1) injection 100 mg, 100 mg, intravenous, Daily, SMITHA Elder-CNP, 100 mg at 07/12/24 0843  •  traMADol (Ultram) tablet 50 mg, 50 mg, oral, q6h PRN, SMITHA Elder-CNP, 50 mg at 07/12/24 0208  Prior to Admission medications    Medication Sig Start Date End Date Taking? Authorizing Provider   acetaminophen (Tylenol) 500 mg tablet Take 2  tablets (1,000 mg) by mouth every 6 hours if needed for mild pain (1 - 3) for up to 10 days. 7/3/24 7/13/24 Yes Seda Lira PA-C   ibuprofen 600 mg tablet Take 1 tablet (600 mg) by mouth every 8 hours if needed (pain).  7/11/24 Yes Historical Provider, MD   amLODIPine (Norvasc) 10 mg tablet Take 1 tablet (10 mg) by mouth once daily. 5/6/24 6/5/24  Lauro Lieberman MD   aspirin 81 mg chewable tablet Chew 1 tablet (81 mg) once daily. 5/6/24 7/5/24  Lauro Lieberman MD   diphenhydramine/Maalox/lidocaine (Magic Mouthwash) - Compounded - Outpatient Swish and spit every 4-6 hours as needed for mouth sores. 7/3/24   Seda Lira PA-C   hydroCHLOROthiazide (HYDRODiuril) 25 mg tablet Take 0.5 tablets (12.5 mg) by mouth once daily. 5/6/24 6/5/24  Lauro Lieberman MD   ondansetron (Zofran) 4 mg tablet Take 1 tablet (4 mg) by mouth every 6 hours for 3 days.  Patient not taking: Reported on 7/10/2024 7/3/24 7/6/24  Seda Lira PA-C   psyllium (Metamucil) 3.4 gram packet Take 1 packet by mouth 2 times a day. Mix and drink with at least 8 ounces of water or juice.  Patient taking differently: Take 1 packet by mouth if needed. Mix and drink with at least 8 ounces of water or juice. 7/3/24 7/3/25  Seda Lira PA-C   traMADol (Ultram) 50 mg tablet Take 1 tablet (50 mg) by mouth every 4 hours if needed for severe pain (7 - 10) for up to 3 days.  Patient not taking: Reported on 7/10/2024 7/3/24 7/6/24  Seda Lira PA-C   amoxicillin (Amoxil) 500 mg tablet Take 1 tablet (500 mg) by mouth every 8 hours. Until finished  7/11/24  Historical Provider, MD   benzocaine-menthol (Cepastat Sore Throat) lozenge Dissolve 1 lozenge in the mouth every 2 hours if needed for sore throat. 7/3/24 7/11/24  Seda Lira PA-C   chlorhexidine (Peridex) 0.12 % solution Use 15 mL in the mouth or throat if needed for wound care. 7/4/24 7/11/24  Varun Erickson PA-C   meloxicam (Mobic) 15 mg tablet Take 1 tablet (15 mg) by mouth once daily.  WITH FOOD. 9/17/18 7/10/24  Historical Provider, MD   oxybutynin (Ditropan) 5 mg tablet Take 1 tablet (5 mg) by mouth 3 times a day.  7/10/24  Historical Provider, MD   pantoprazole (ProtoNix) 40 mg EC tablet Take 1 tablet (40 mg) by mouth early in the morning.. 9/25/23 7/10/24  Historical Provider, MD   tamsulosin (Flomax) 0.4 mg 24 hr capsule Take 1 capsule (0.4 mg) by mouth once daily. 9/17/18 7/10/24  Historical Provider, MD     Allergies   Allergen Reactions   • Erythromycin Hives     Social History     Tobacco Use   • Smoking status: Former     Types: Cigarettes   • Smokeless tobacco: Former   Substance Use Topics   • Alcohol use: Not on file         Chemistry    Lab Results   Component Value Date/Time     07/12/2024 0607    K 3.1 (L) 07/12/2024 0607     07/12/2024 0607    CO2 28 07/12/2024 0607    BUN 12 07/12/2024 0607    CREATININE 0.83 07/12/2024 0607    Lab Results   Component Value Date/Time    CALCIUM 7.9 (L) 07/12/2024 0607    ALKPHOS 67 07/10/2024 1400    AST 28 07/10/2024 1400    ALT 28 07/10/2024 1400    BILITOT 1.4 (H) 07/10/2024 1400          Lab Results   Component Value Date/Time    WBC 6.0 07/12/2024 0607    HGB 10.0 (L) 07/12/2024 0607    HCT 29.7 (L) 07/12/2024 0607     07/12/2024 0607     Lab Results   Component Value Date/Time    PROTIME 11.3 07/12/2024 0943    INR 1.0 07/12/2024 0943     Encounter Date: 07/10/24   ECG 12 lead   Result Value    Ventricular Rate 80    Atrial Rate 80    AZ Interval 158    QRS Duration 88    QT Interval 434    QTC Calculation(Bazett) 500    P Axis 71    R Axis -42    T Axis 1    QRS Count 13    Q Onset 224    P Onset 145    P Offset 192    T Offset 441    QTC Fredericia 478    Narrative    Sinus rhythm with Fusion complexes  Left axis deviation  Prolonged QT  Abnormal ECG  When compared with ECG of 11-JUL-2024 17:46, (unconfirmed)  Fusion complexes are now Present     No results found for this or any previous visit from the past 1095 days.        Anesthesia Plan    History of general anesthesia?: yes  History of complications of general anesthesia?: no    ASA 3     MAC     intravenous induction   Anesthetic plan and risks discussed with patient.  Use of blood products discussed with patient who consented to blood products.    Plan discussed with CAA.

## 2024-07-12 NOTE — HOSPITAL COURSE
Joe Alaniz is a 58 y.o. male with a PMHx of HTN, BPH, left inguinal hernia and surgery for PUD many years ago who initially presented to the ED for a syncopal episode in setting of N/V 3x daily since May (one ep of hematemesis), diarrhea (nonbloody) and 50 lb weight loss. On arrival to ED, patient satting 88% RA. CXR showed no acute finding and CTPE was negative. He was found to be hypomagnesemic and hypokalemia in the ED. He was started on O2 supplementation with NC    Started on O2 but weaned back to RA while in step down unit. Patient was given 2L IVF in ED and had electrolytes (K and Mg repleted). Started on thiamine, folic acid for hx of EtOH use. Additionally pt had LEIF that resolved with fluids. TTE showed EF of 61% (7/11). EKG with Qtc of 455. Lactate elevated to 4.3 normalized over hospital stay. 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 Bx were taking and pending read. Started on PPI BID. Patient had extremely poor dentition w/ tooth fracture vs decay vs abscess. OMFS recommended outpatient follow up for tooth removal. Patient received D5NS and then transitioned to PO food. D/t poor dentition, patient had to eat soft foods. Patient transferred to floor for further monitoring of possible refeeding syndrome.

## 2024-07-12 NOTE — CONSULTS
"Reason For Consult  Cares / Teeth pulling    History Of Present Illness  Joe Alaniz is a 58 y.o. male presenting with N/V and electrolyte abnormalities, found to have mass on CT scan.     Oral Surgery consulted for possible tooth extraction after oral examination by dental of teeth #16,30. Patient reports pain with movement and touching. Getting worse since he recently chipped his tooth off. Denies any blood, purulent discharge, or infections recently.      Past Medical History  He has no past medical history on file.    Surgical History  He has no past surgical history on file.     Social History  He reports that he has quit smoking. His smoking use included cigarettes. He has quit using smokeless tobacco. No history on file for alcohol use and drug use.    Family History  No family history on file.     Allergies  Erythromycin    Review of Systems  + Tooth pain  - purulent drainage, fever, chills, blood from the mouth     Physical Exam  Physical Exam  Constitutional:       Appearance: Normal appearance.   HENT:      Head: Normocephalic and atraumatic.      Mouth/Throat:      Lips: Pink. Lesions present.      Mouth: Mucous membranes are moist. No oral lesions.      Dentition: Abnormal dentition. Does not have dentures. Dental tenderness (#16,30) and dental caries (#16,30) present. No gum lesions.      Tongue: No lesions. Tongue does not deviate from midline.      Comments: #16 chipped without signs of purulent drainage or blood. Cares present  Neurological:      Mental Status: He is alert.         Last Recorded Vitals  Blood pressure (!) 139/93, pulse 86, temperature 36.8 °C (98.2 °F), temperature source Temporal, resp. rate 16, height 1.676 m (5' 6\"), weight (!) 44.5 kg (98 lb), SpO2 99%.    Relevant Results  Panorex scan or teeth showing     1. Periapical lucencies are noted involving the right mandibular 2nd  premolar and right mandibular 1st molar consistent with periapical  abscess.     Assessment/Plan "     Joe Alaniz is a 58 y.o. male consulted by OMFS for Dental Cares and Periapical Abscess.     > This procedure is typically done in the outpatient setting, and patient does not urgently need the procedure   > we recommend having the patient see an outpatient dentist for removal of affected teeth or root canal      -We would be happy to see the patient at our clinic, located at [address] [place] [address]. The patient can request an appointment by calling [phone] and requesting an appointment with oral surgery as a follow up to this consult    The patient was discussed with the chief, Dr. Riley who agree with the plan    Nabil Harris MD

## 2024-07-12 NOTE — CARE PLAN
Problem: Nutrition  Goal: Less than 5 days NPO/clear liquids  Outcome: Progressing  Goal: Oral intake greater than 50%  Outcome: Progressing  Goal: Oral intake greater 75%  Outcome: Progressing  Goal: Consume prescribed supplement  Outcome: Progressing  Goal: Adequate PO fluid intake  Outcome: Progressing  Goal: Nutrition support goals are met within 48 hrs  Outcome: Progressing  Goal: Nutrition support is meeting 75% of nutrient needs  Outcome: Progressing  Goal: Tube feed tolerance  Outcome: Progressing  Goal: BG  mg/dL  Outcome: Progressing  Goal: Lab values WNL  Outcome: Progressing  Goal: Electrolytes WNL  Outcome: Progressing  Goal: Promote healing  Outcome: Progressing  Goal: Maintain stable weight  Outcome: Progressing  Goal: Reduce weight from edema/fluid  Outcome: Progressing  Goal: Gradual weight gain  Outcome: Progressing  Goal: Improve ostomy output  Outcome: Progressing     Problem: Pain  Goal: Takes deep breaths with improved pain control throughout the shift  Outcome: Progressing  Goal: Turns in bed with improved pain control throughout the shift  Outcome: Progressing  Goal: Walks with improved pain control throughout the shift  Outcome: Progressing  Goal: Performs ADL's with improved pain control throughout shift  Outcome: Progressing  Goal: Participates in PT with improved pain control throughout the shift  Outcome: Progressing  Goal: Free from opioid side effects throughout the shift  Outcome: Progressing  Goal: Free from acute confusion related to pain meds throughout the shift  Outcome: Progressing     Problem: Fall/Injury  Goal: Not fall by end of shift  Outcome: Progressing  Goal: Be free from injury by end of the shift  Outcome: Progressing  Goal: Verbalize understanding of personal risk factors for fall in the hospital  Outcome: Progressing  Goal: Verbalize understanding of risk factor reduction measures to prevent injury from fall in the home  Outcome: Progressing  Goal: Use  assistive devices by end of the shift  Outcome: Progressing  Goal: Pace activities to prevent fatigue by end of the shift  Outcome: Progressing     Problem: Skin  Goal: Decreased wound size/increased tissue granulation at next dressing change  Outcome: Progressing  Flowsheets (Taken 7/12/2024 1107)  Decreased wound size/increased tissue granulation at next dressing change: Promote sleep for wound healing  Goal: Participates in plan/prevention/treatment measures  Outcome: Progressing  Flowsheets (Taken 7/12/2024 1107)  Participates in plan/prevention/treatment measures: Elevate heels  Goal: Prevent/manage excess moisture  Outcome: Progressing  Flowsheets (Taken 7/12/2024 1107)  Prevent/manage excess moisture: Cleanse incontinence/protect with barrier cream  Goal: Prevent/minimize sheer/friction injuries  Outcome: Progressing  Flowsheets (Taken 7/12/2024 1107)  Prevent/minimize sheer/friction injuries: Turn/reposition every 2 hours/use positioning/transfer devices  Goal: Promote/optimize nutrition  Outcome: Progressing  Flowsheets (Taken 7/12/2024 1107)  Promote/optimize nutrition: Discuss with provider if NPO > 2 days  Goal: Promote skin healing  Outcome: Progressing  Flowsheets (Taken 7/12/2024 1107)  Promote skin healing: Protective dressings over bony prominences     Problem: Pain - Adult  Goal: Verbalizes/displays adequate comfort level or baseline comfort level  Outcome: Progressing     Problem: Safety - Adult  Goal: Free from fall injury  Outcome: Progressing     Problem: Discharge Planning  Goal: Discharge to home or other facility with appropriate resources  Outcome: Progressing     Problem: Chronic Conditions and Co-morbidities  Goal: Patient's chronic conditions and co-morbidity symptoms are monitored and maintained or improved  Outcome: Progressing

## 2024-07-13 LAB
ALBUMIN SERPL BCP-MCNC: 3 G/DL (ref 3.4–5)
ALBUMIN SERPL BCP-MCNC: 3 G/DL (ref 3.4–5)
ANION GAP SERPL CALC-SCNC: 13 MMOL/L (ref 10–20)
ANION GAP SERPL CALC-SCNC: 13 MMOL/L (ref 10–20)
BUN SERPL-MCNC: 5 MG/DL (ref 6–23)
BUN SERPL-MCNC: 6 MG/DL (ref 6–23)
CALCIUM SERPL-MCNC: 6.9 MG/DL (ref 8.6–10.6)
CALCIUM SERPL-MCNC: 7.1 MG/DL (ref 8.6–10.6)
CEA SERPL-MCNC: 3.8 UG/L
CHLORIDE SERPL-SCNC: 102 MMOL/L (ref 98–107)
CHLORIDE SERPL-SCNC: 104 MMOL/L (ref 98–107)
CO2 SERPL-SCNC: 24 MMOL/L (ref 21–32)
CO2 SERPL-SCNC: 25 MMOL/L (ref 21–32)
CREAT SERPL-MCNC: 0.7 MG/DL (ref 0.5–1.3)
CREAT SERPL-MCNC: 0.72 MG/DL (ref 0.5–1.3)
EGFRCR SERPLBLD CKD-EPI 2021: >90 ML/MIN/1.73M*2
EGFRCR SERPLBLD CKD-EPI 2021: >90 ML/MIN/1.73M*2
ERYTHROCYTE [DISTWIDTH] IN BLOOD BY AUTOMATED COUNT: 12.2 % (ref 11.5–14.5)
FERRITIN SERPL-MCNC: 347 NG/ML (ref 20–300)
GLUCOSE SERPL-MCNC: 140 MG/DL (ref 74–99)
GLUCOSE SERPL-MCNC: 85 MG/DL (ref 74–99)
HCT VFR BLD AUTO: 33.1 % (ref 41–52)
HGB BLD-MCNC: 11.2 G/DL (ref 13.5–17.5)
IRON SATN MFR SERPL: 16 % (ref 25–45)
IRON SERPL-MCNC: 32 UG/DL (ref 35–150)
MAGNESIUM SERPL-MCNC: 1.48 MG/DL (ref 1.6–2.4)
MAGNESIUM SERPL-MCNC: 2.08 MG/DL (ref 1.6–2.4)
MCH RBC QN AUTO: 32.2 PG (ref 26–34)
MCHC RBC AUTO-ENTMCNC: 33.8 G/DL (ref 32–36)
MCV RBC AUTO: 95 FL (ref 80–100)
NRBC BLD-RTO: 0 /100 WBCS (ref 0–0)
PHOSPHATE SERPL-MCNC: 1.8 MG/DL (ref 2.5–4.9)
PHOSPHATE SERPL-MCNC: 1.9 MG/DL (ref 2.5–4.9)
PLATELET # BLD AUTO: 370 X10*3/UL (ref 150–450)
POTASSIUM SERPL-SCNC: 3.3 MMOL/L (ref 3.5–5.3)
POTASSIUM SERPL-SCNC: 3.5 MMOL/L (ref 3.5–5.3)
RBC # BLD AUTO: 3.48 X10*6/UL (ref 4.5–5.9)
SODIUM SERPL-SCNC: 136 MMOL/L (ref 136–145)
SODIUM SERPL-SCNC: 138 MMOL/L (ref 136–145)
TIBC SERPL-MCNC: 196 UG/DL (ref 240–445)
UIBC SERPL-MCNC: 164 UG/DL (ref 110–370)
WBC # BLD AUTO: 6.2 X10*3/UL (ref 4.4–11.3)

## 2024-07-13 PROCEDURE — 83735 ASSAY OF MAGNESIUM: CPT | Performed by: NURSE PRACTITIONER

## 2024-07-13 PROCEDURE — 2500000001 HC RX 250 WO HCPCS SELF ADMINISTERED DRUGS (ALT 637 FOR MEDICARE OP)

## 2024-07-13 PROCEDURE — 80069 RENAL FUNCTION PANEL: CPT | Performed by: NURSE PRACTITIONER

## 2024-07-13 PROCEDURE — 2500000004 HC RX 250 GENERAL PHARMACY W/ HCPCS (ALT 636 FOR OP/ED): Performed by: NURSE PRACTITIONER

## 2024-07-13 PROCEDURE — 2500000004 HC RX 250 GENERAL PHARMACY W/ HCPCS (ALT 636 FOR OP/ED)

## 2024-07-13 PROCEDURE — 85027 COMPLETE CBC AUTOMATED: CPT | Performed by: NURSE PRACTITIONER

## 2024-07-13 PROCEDURE — 80069 RENAL FUNCTION PANEL: CPT

## 2024-07-13 PROCEDURE — 36415 COLL VENOUS BLD VENIPUNCTURE: CPT | Performed by: NURSE PRACTITIONER

## 2024-07-13 PROCEDURE — 83735 ASSAY OF MAGNESIUM: CPT

## 2024-07-13 PROCEDURE — 2500000005 HC RX 250 GENERAL PHARMACY W/O HCPCS: Performed by: NURSE PRACTITIONER

## 2024-07-13 PROCEDURE — 2500000005 HC RX 250 GENERAL PHARMACY W/O HCPCS

## 2024-07-13 PROCEDURE — C9113 INJ PANTOPRAZOLE SODIUM, VIA: HCPCS | Performed by: NURSE PRACTITIONER

## 2024-07-13 PROCEDURE — 2500000002 HC RX 250 W HCPCS SELF ADMINISTERED DRUGS (ALT 637 FOR MEDICARE OP, ALT 636 FOR OP/ED)

## 2024-07-13 PROCEDURE — 83540 ASSAY OF IRON: CPT

## 2024-07-13 PROCEDURE — 1200000002 HC GENERAL ROOM WITH TELEMETRY DAILY

## 2024-07-13 PROCEDURE — 36415 COLL VENOUS BLD VENIPUNCTURE: CPT

## 2024-07-13 PROCEDURE — 2500000001 HC RX 250 WO HCPCS SELF ADMINISTERED DRUGS (ALT 637 FOR MEDICARE OP): Performed by: NURSE PRACTITIONER

## 2024-07-13 PROCEDURE — 82728 ASSAY OF FERRITIN: CPT

## 2024-07-13 RX ORDER — LANOLIN ALCOHOL/MO/W.PET/CERES
1000 CREAM (GRAM) TOPICAL DAILY
Status: DISCONTINUED | OUTPATIENT
Start: 2024-07-13 | End: 2024-07-16 | Stop reason: HOSPADM

## 2024-07-13 RX ORDER — MAGNESIUM SULFATE HEPTAHYDRATE 40 MG/ML
4 INJECTION, SOLUTION INTRAVENOUS ONCE
Status: COMPLETED | OUTPATIENT
Start: 2024-07-13 | End: 2024-07-13

## 2024-07-13 RX ORDER — MULTIVIT-MIN/IRON FUM/FOLIC AC 7.5 MG-4
1 TABLET ORAL DAILY
Status: DISCONTINUED | OUTPATIENT
Start: 2024-07-13 | End: 2024-07-16 | Stop reason: HOSPADM

## 2024-07-13 RX ORDER — FERROUS SULFATE 325(65) MG
65 TABLET ORAL
Status: DISCONTINUED | OUTPATIENT
Start: 2024-07-14 | End: 2024-07-16 | Stop reason: HOSPADM

## 2024-07-13 RX ORDER — ENOXAPARIN SODIUM 100 MG/ML
40 INJECTION SUBCUTANEOUS DAILY
Status: DISCONTINUED | OUTPATIENT
Start: 2024-07-13 | End: 2024-07-16 | Stop reason: HOSPADM

## 2024-07-13 RX ORDER — ACETAMINOPHEN 500 MG
10 TABLET ORAL NIGHTLY
Status: DISCONTINUED | OUTPATIENT
Start: 2024-07-13 | End: 2024-07-16 | Stop reason: HOSPADM

## 2024-07-13 RX ORDER — POTASSIUM CHLORIDE 20 MEQ/1
20 TABLET, EXTENDED RELEASE ORAL ONCE
Status: COMPLETED | OUTPATIENT
Start: 2024-07-13 | End: 2024-07-13

## 2024-07-13 RX ORDER — CALCIUM CARBONATE 200(500)MG
1000 TABLET,CHEWABLE ORAL DAILY
Status: DISCONTINUED | OUTPATIENT
Start: 2024-07-13 | End: 2024-07-16 | Stop reason: HOSPADM

## 2024-07-13 RX ORDER — PANTOPRAZOLE SODIUM 40 MG/1
40 TABLET, DELAYED RELEASE ORAL
Status: DISCONTINUED | OUTPATIENT
Start: 2024-07-14 | End: 2024-07-16 | Stop reason: HOSPADM

## 2024-07-13 ASSESSMENT — PAIN DESCRIPTION - LOCATION: LOCATION: MOUTH

## 2024-07-13 ASSESSMENT — PAIN SCALES - GENERAL
PAINLEVEL_OUTOF10: 10 - WORST POSSIBLE PAIN
PAINLEVEL_OUTOF10: 0 - NO PAIN
PAINLEVEL_OUTOF10: 0 - NO PAIN
PAINLEVEL_OUTOF10: 8
PAINLEVEL_OUTOF10: 10 - WORST POSSIBLE PAIN

## 2024-07-13 ASSESSMENT — PAIN - FUNCTIONAL ASSESSMENT
PAIN_FUNCTIONAL_ASSESSMENT: 0-10

## 2024-07-13 ASSESSMENT — ACTIVITIES OF DAILY LIVING (ADL)
TOILETING: INDEPENDENT
HEARING - RIGHT EAR: FUNCTIONAL
FEEDING YOURSELF: INDEPENDENT
DRESSING YOURSELF: INDEPENDENT
GROOMING: INDEPENDENT
ADEQUATE_TO_COMPLETE_ADL: YES
HEARING - LEFT EAR: FUNCTIONAL
BATHING: INDEPENDENT
JUDGMENT_ADEQUATE_SAFELY_COMPLETE_DAILY_ACTIVITIES: YES
PATIENT'S MEMORY ADEQUATE TO SAFELY COMPLETE DAILY ACTIVITIES?: YES
WALKS IN HOME: INDEPENDENT

## 2024-07-13 NOTE — PROGRESS NOTES
Physical Therapy                 Therapy Communication Note    Patient Name: Joe Alaniz  MRN: [id number]  Today's Date: 7/13/2024     Discipline: Physical Therapy    Missed Visit Reason: Missed Visit Reason: Patient refused. Will re attempt as available.     Missed Time: Attempt    Comment:

## 2024-07-13 NOTE — CARE PLAN
Problem: Nutrition  Goal: Less than 5 days NPO/clear liquids  Outcome: Progressing  Goal: Oral intake greater than 50%  Outcome: Progressing  Goal: Oral intake greater 75%  Outcome: Progressing  Goal: Consume prescribed supplement  Outcome: Progressing  Goal: Adequate PO fluid intake  Outcome: Progressing  Goal: Nutrition support goals are met within 48 hrs  Outcome: Progressing  Goal: Nutrition support is meeting 75% of nutrient needs  Outcome: Progressing  Goal: Tube feed tolerance  Outcome: Progressing  Goal: BG  mg/dL  Outcome: Progressing  Goal: Lab values WNL  Outcome: Progressing  Goal: Electrolytes WNL  Outcome: Progressing  Goal: Promote healing  Outcome: Progressing  Goal: Maintain stable weight  Outcome: Progressing  Goal: Reduce weight from edema/fluid  Outcome: Progressing  Goal: Gradual weight gain  Outcome: Progressing  Goal: Improve ostomy output  Outcome: Progressing     Problem: Pain  Goal: Takes deep breaths with improved pain control throughout the shift  Outcome: Progressing  Goal: Turns in bed with improved pain control throughout the shift  Outcome: Progressing  Goal: Walks with improved pain control throughout the shift  Outcome: Progressing  Goal: Performs ADL's with improved pain control throughout shift  Outcome: Progressing  Goal: Participates in PT with improved pain control throughout the shift  Outcome: Progressing  Goal: Free from opioid side effects throughout the shift  Outcome: Progressing  Goal: Free from acute confusion related to pain meds throughout the shift  Outcome: Progressing     Problem: Fall/Injury  Goal: Not fall by end of shift  Outcome: Progressing  Goal: Be free from injury by end of the shift  Outcome: Progressing  Goal: Verbalize understanding of personal risk factors for fall in the hospital  Outcome: Progressing  Goal: Verbalize understanding of risk factor reduction measures to prevent injury from fall in the home  Outcome: Progressing  Goal: Use  assistive devices by end of the shift  Outcome: Progressing  Goal: Pace activities to prevent fatigue by end of the shift  Outcome: Progressing     Problem: Skin  Goal: Decreased wound size/increased tissue granulation at next dressing change  Outcome: Progressing  Flowsheets (Taken 7/13/2024 1004)  Decreased wound size/increased tissue granulation at next dressing change: Promote sleep for wound healing  Goal: Participates in plan/prevention/treatment measures  Outcome: Progressing  Flowsheets (Taken 7/13/2024 1004)  Participates in plan/prevention/treatment measures: Elevate heels  Goal: Prevent/manage excess moisture  Outcome: Progressing  Flowsheets (Taken 7/13/2024 1004)  Prevent/manage excess moisture: Moisturize dry skin  Goal: Prevent/minimize sheer/friction injuries  Outcome: Progressing  Flowsheets (Taken 7/13/2024 1004)  Prevent/minimize sheer/friction injuries: Turn/reposition every 2 hours/use positioning/transfer devices  Goal: Promote/optimize nutrition  Outcome: Progressing  Flowsheets (Taken 7/13/2024 1004)  Promote/optimize nutrition: Monitor/record intake including meals  Goal: Promote skin healing  Outcome: Progressing  Flowsheets (Taken 7/13/2024 1004)  Promote skin healing: Protective dressings over bony prominences     Problem: Pain - Adult  Goal: Verbalizes/displays adequate comfort level or baseline comfort level  Outcome: Progressing     Problem: Safety - Adult  Goal: Free from fall injury  Outcome: Progressing     Problem: Discharge Planning  Goal: Discharge to home or other facility with appropriate resources  Outcome: Progressing     Problem: Chronic Conditions and Co-morbidities  Goal: Patient's chronic conditions and co-morbidity symptoms are monitored and maintained or improved  Outcome: Progressing

## 2024-07-13 NOTE — PROGRESS NOTES
Joe Alaniz is a 58 y.o. male on day 3 of admission presenting with Failure to thrive in adult.    Subjective   Joe Alaniz is a 58 y.o. male with a PMHx of HTN, BPH, left inguinal hernia and surgery for PUD many years ago who initially presented to the ED for a syncopal episode and was found to have N/V 3x daily since May (one ep for hematemesis), diarrhea (nonbloody) and 45 lb weight loss. Recently has not had a BM. Denies pain, f/c, CP, cough, SOB. Had been in the ED 2 days ago for mouth sores since May. Found to have K 2.1 on admission so was admitted to SDU for electrolyte management and further workup.    Hospital Course  On arrival to ED, patient satting 88% RA. CXR showed no acute finding and CTPE was negative. Started on O2 but weaned back to RA while in step down unit. Patient was given 2L IVF in ED and had electrolytes (K and Mg repleted). Started on thiamine, folic acid for hx of EtOH use. Additionally pt had LEIF that resolved with fluids. TTE showed EF of 61% (7/11). EKG with Qtc of 455. Lactate elevated to 4.3 normalized over hospital stay. 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 Bx were taking and pending read. Started on PPI BID. Patient had extremely poor dentition w/ tooth fracture vs decay vs abscess. OMFS recommended outpatient follow up for tooth removal. Patient received D5NS and then transitioned to PO food. D/t poor dentition, patient had to eat soft foods Patient transferred to floor for further monitoring of possible refeeding syndrome.    On Transfer  Patient was overall feeling well. Had not had a BM in a few days. Denies any new episodes of n/v, hematemesis, CP, SOB. Endorses abdominal pain from his inguinal hernia. Says he has been trying to get surgery for a while. Unable to eat properly d/t bleeding when  "he tries to eat. Otherwise doing well.       Objective     Physical Exam  General: cachectic, NAD  HEENT: NCAT, EOMI, no icterus  Cardiac: RRR, no MRG  Pulm: symmetric chest expansion, CTAB  GI: ABS, NTND, tender L inguinal hernia without overlying skin changes  Extremities: no pedal edema  MSK: no joint swelling  Skin: no bruising or erythema  Neuro: Aox3, CN II-XII grossly intact  Psych: appropriate mood and affect    Last Recorded Vitals  Blood pressure (!) 136/99, pulse 101, temperature 36.4 °C (97.5 °F), temperature source Temporal, resp. rate 16, height 1.676 m (5' 6\"), weight (!) 44.5 kg (98 lb), SpO2 100%.  Intake/Output last 3 Shifts:  I/O last 3 completed shifts:  In: 2071.2 (46.6 mL/kg) [I.V.:1714.2 (38.6 mL/kg); IV Piggyback:357]  Out: 1225 (27.6 mL/kg) [Urine:1225 (0.8 mL/kg/hr)]  Weight: 44.5 kg     Relevant Results              Scheduled medications  amLODIPine, 10 mg, oral, Daily  [START ON 7/14/2024] ergocalciferol, 50,000 Units, oral, Every Sunday  folic acid, 1 mg, oral, Daily  heparin (porcine), 5,000 Units, subcutaneous, q8h EBE  lidocaine, 0.1 mL, subcutaneous, Once  melatonin, 10 mg, oral, Nightly  pantoprazole, 40 mg, intravenous, BID  polyethylene glycol, 17 g, oral, Daily  thiamine, 100 mg, intravenous, Daily      Continuous medications     PRN medications  PRN medications: acetaminophen, benzocaine, benzocaine-menthol, hydrALAZINE, lidocaine-diphenhydraMINE-Maalox 1:1:1, ondansetron, ondansetron, oxygen, promethazine (Phenergan) 6.25 mg in sodium chloride 0.9% 50 mL IV, traMADol            Malnutrition Diagnosis Status: New  Malnutrition Diagnosis: Severe malnutrition related to chronic disease or condition  As Evidenced by: pt with a decrease in PO intake for the last two months with at least a 27% weight loss (possibly more) with noted severe fat and muscle loss on physical exam  I agree with the dietitian's malnutrition diagnosis.      Assessment/Plan   Principal Problem:    Failure to " thrive in adult  Active Problems:    HTN (hypertension)    Joe Alaniz is a 58 y.o. 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 Stable for transfer to floor working on advancing diet and monitoring for refeeding syndrome s/p D5NS and initiation of diet.    #FTT  #Gastric mass, bx pending  :: CT A/P with contrast 7/10: 2x1.5cm mass within posterior wall of gasttric fundus c/f underlying neoplasm. C/f esophagitis. Diverticulosis without acute diverticulitis. 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 Pylori pending  - CA 19-9 pending  - CEA pending  - c/w pantoprazole 40 mg PO BID    #Electrolyte derangements  #C/f refeeding syndrome  :: K 2.1 on admission -> 3.5 upon transfer  :: Mg wnl on admission -> 1.48 upon transfer  :: Phos wnl on admission -> 1.9 upon transfer  :: Ca wnl on admission -> 7.1 upon transfer (8.2 corrected for albumin of 3.0)  - Continue to aggressively replete K>4, Mg>2, Phos>3  - BID RFP/Mg  - Continue soft diet    #Malnutrition  - c/w folic acid, thiamine  - c/w weekly vitamin D supplementation  - start B12 and MVI    #Poor dentition  :: OMFS and dental consulted, will perform tooth extraction outpatient  - c/w oral care    #HTN  :: Has had soft BP during hospital stay  - c/w amlodipine 10 mg PO daily    #Iron deficiency anemia  :: 15.8 on admission -> 11.2 on transfer  :: Fe 32, TIBC 196  - Ferous sulfate 325 mg PO x1 to be given 7/14 AM    #BPH  - holding home tamsulosin, pt said  not taking    F: replete PRN  E: aggressively replete K>4, Mg>2, Phos>3  N: soft diet, advance slowly  GI: PPI, PEG    DVT: subcutaneous heparin    Access: pIV    Pain regimen: tylenol 650 q4 PRN    Dispo: PT/OT pending    Code Status: Full code   NOK: Too Lewis, [phone]    To be staffed with attending in AM             Ronda Odom MD

## 2024-07-13 NOTE — PROGRESS NOTES
"Joe Alaniz is a 58 y.o. male on day 3 of admission presenting with Failure to thrive in adult.    Subjective     No acute events overnight  Endorses ongoing oral pain.  Denies fever, chills, cp, palpations, sob, difficulty swallowing, abdominal pain, and n/v     Objective   Physical Exam:  Constitutional: cachectic male in NAD, alert and cooperative  Eyes: PERRL, EOMI, no icterus   ENMT: mucous membranes moist, oral lesions noted, poor dentition   Head/Neck: Neck supple, no apparent injury  Respiratory/Thorax: Lungs CTA bilaterally, non-labored breathing, no cough, on RA  Cardiovascular: Regular, rate and rhythm, no murmurs, normal S1 and S2  Gastrointestinal: Nondistended, soft, non-tender, BS present x 4  Musculoskeletal: ROM intact  Extremities: normal extremities, no edema, contusions or wounds  Neurological: alert and oriented x 3, speech clear, follows commands appropriately, without focal deficits  Skin: Warm and dry    Vital Signs  Blood pressure (!) 147/93, pulse 94, temperature 36.9 °C (98.4 °F), temperature source Temporal, resp. rate 19, height 1.676 m (5' 6\"), weight (!) 44.5 kg (98 lb), SpO2 100%.  Oxygen Therapy  SpO2: 100 %  Medical Gas Therapy: None (Room air)  O2 Delivery Method: Simple mask       Intake/Output last 3 Shifts:  I/O last 3 completed shifts:  In: 2071.2 (46.6 mL/kg) [I.V.:1714.2 (38.6 mL/kg); IV Piggyback:357]  Out: 1225 (27.6 mL/kg) [Urine:1225 (0.8 mL/kg/hr)]  Weight: 44.5 kg     Lines and Tubes:  Peripheral IV 07/10/24 20 G Right;Ventral Antecubital (Active)   Placement Date/Time: 07/10/24 1334   Placed by External Staff?: (c)   Hand Hygiene Completed: Yes  Size (Gauge): 20 G  Orientation: Right;Ventral  Location: Antecubital  Site Prep: Chlorhexidine   Local Anesthetic: None  Technique: Anatomical landmark...   Number of days: 1       Peripheral IV 07/10/24 20 G Left;Proximal;Upper Arm (Active)   Placement Date/Time: 07/10/24 1500   Hand Hygiene Completed: Yes  Size (Gauge): 20 " G  Orientation: Left;Proximal;Upper  Location: Arm  Site Prep: Chlorhexidine    Number of days: 1         Relevant Results  Scheduled medications  amLODIPine, 10 mg, oral, Daily  [START ON 7/14/2024] ergocalciferol, 50,000 Units, oral, Every Sunday  folic acid, 1 mg, oral, Daily  heparin (porcine), 5,000 Units, subcutaneous, q8h BEE  lidocaine, 0.1 mL, subcutaneous, Once  pantoprazole, 40 mg, intravenous, BID  [Held by provider] polyethylene glycol, 17 g, oral, Daily  thiamine, 100 mg, intravenous, Daily      Continuous medications  D5 % and 0.9 % sodium chloride, 50 mL/hr, Last Rate: 50 mL/hr (07/13/24 0351)      PRN medications  PRN medications: acetaminophen, benzocaine, benzocaine-menthol, hydrALAZINE, lidocaine-diphenhydraMINE-Maalox 1:1:1, ondansetron, ondansetron, oxygen, promethazine (Phenergan) 6.25 mg in sodium chloride 0.9% 50 mL IV, traMADol    Results for orders placed or performed during the hospital encounter of 07/10/24 (from the past 24 hour(s))   Coagulation Screen   Result Value Ref Range    Protime 11.3 9.8 - 12.8 seconds    INR 1.0 0.9 - 1.1    aPTT 23 (L) 27 - 38 seconds   POCT GLUCOSE   Result Value Ref Range    POCT Glucose 96 74 - 99 mg/dL   Renal function panel   Result Value Ref Range    Glucose 89 74 - 99 mg/dL    Sodium 139 136 - 145 mmol/L    Potassium 3.7 3.5 - 5.3 mmol/L    Chloride 103 98 - 107 mmol/L    Bicarbonate 27 21 - 32 mmol/L    Anion Gap 13 10 - 20 mmol/L    Urea Nitrogen 7 6 - 23 mg/dL    Creatinine 0.69 0.50 - 1.30 mg/dL    eGFR >90 >60 mL/min/1.73m*2    Calcium 7.8 (L) 8.6 - 10.6 mg/dL    Phosphorus 2.1 (L) 2.5 - 4.9 mg/dL    Albumin 3.3 (L) 3.4 - 5.0 g/dL     Endoscopic Ultrasound (Upper) w Biopsy    Result Date: 7/12/2024  Table formatting from the original result was not included. Findings Patchy grade D esophagitis with multiple mucosal breaks measuring 5 mm or more, continuous between folds, covering 75% or more of the circumference in the lower third of the  esophagus; performed cold forceps biopsy Homogeneous, hypoechoic, round and submucosal mass measuring 18 mm x 16 mm was visualized in the fundus of the stomach, contained within the muscularis propria; 1 successful fine needle biopsy pass was taken with a 22 gauge Zipdial needle using a transgastric approach guided by Doppler. A sample was sent for histology analysis. 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 Severe, generalized atrophic and erythematous mucosa in the fundus of the stomach, body of the stomach, antrum and prepyloric region, consistent with gastritis; performed cold forceps biopsy to rule out H. pylori The pylorus appeared normal. The duodenal bulb, 1st part of the duodenum and 2nd part of the duodenum appeared normal. Recommendation Await pathology results Follow up with primary care physician (patient currently does not have one). Follow up with Cecelia Sadler MD - Inpatient Medicine Service. Follow up with Kerwin Cardozo MD and Magaly Franco MD - Inpatient GI Consult Service. Indication Odynophagia, Abnormal CT of the abdomen, Gastric mass, Esophageal thickening, Nausea and vomiting, unspecified vomiting type Staff Staff Role Kip Ramos MD Proceduralist Medications See Anesthesia Record. Preprocedure A history and physical has been performed, and patient medication allergies have been reviewed. The patient's tolerance of previous anesthesia has been reviewed. The risks and benefits of the procedure and the sedation options and risks were discussed with the patient. All questions were answered and informed consent obtained. Details of the Procedure The patient underwent monitored anesthesia care, which was administered by an anesthesia professional. The patient's blood pressure, heart rate, level of consciousness, oxygen, respirations, ECG and ETCO2 were monitored throughout the procedure. The endoscope, linear scope and radial scope were introduced through the mouth and  advanced to the second part of the duodenum. Retroflexion was performed in the fundus. The patient's estimated blood loss was minimal (<5 mL). The procedure was not difficult. The patient tolerated the procedure well. There were no apparent adverse events. Events Procedure Events Event Event Time ENDO SCOPE IN TIME 7/12/2024  2:06 PM ENDO SCOPE OUT TIME 7/12/2024  2:19 PM ENDO SCOPE IN TIME 7/12/2024  2:24 PM ENDO SCOPE OUT TIME 7/12/2024  2:46 PM Specimens ID Type Source Tests Collected by Time 1 : antrum, body, incisura Tissue STOMACH BODY/CORPUS BIOPSY SURGICAL PATHOLOGY EXAM Kip Ramos MD 7/12/2024 1414 2 :  Tissue ESOPHAGUS DISTAL BIOPSY SURGICAL PATHOLOGY EXAM Kip Ramos MD 7/12/2024 1416 3 : GIST FNB Tissue STOMACH FUNDUS BIOPSY SURGICAL PATHOLOGY EXAM Kip Ramos MD 7/12/2024 1446 Procedure Location [place] [address] Surgical Oncology [id number] [facility], [facility] [address] [phone] Referring Provider Magaly Franco MD Procedure Provider Kip Ramos MD     XR panorex    Result Date: 7/12/2024  Interpreted By:  Ijeoma Herrera, STUDY: XR PANOREX   INDICATION: Signs/Symptoms:L upper molar fracture vs decay causing signifcant pain   COMPARISON: None.   ACCESSION NUMBER(S): [id number]   ORDERING CLINICIAN: GERBER ARMENTA   FINDINGS: Single panoramic radiograph of the mandible was obtained. Periapical lucencies are noted involving the right mandibular 2nd premolar and right mandibular 1st molar consistent with periapical abscess. No discrete abnormality is appreciated involving the left maxillary teeth. Paranasal sinuses are clear. There is no evidence of mandibular dislocation or fracture.       1. Periapical lucencies are noted involving the right mandibular 2nd premolar and right mandibular 1st molar consistent with periapical abscess.   Signed by: Ijeoma Herrera 7/12/2024 10:17 AM Dictation workstation:   [id number]    ECG 12  lead    Result Date: 7/12/2024  Sinus rhythm with Fusion complexes Left axis deviation Prolonged QT Abnormal ECG When compared with ECG of 11-JUL-2024 17:46, (unconfirmed) Fusion complexes are now Present    Transthoracic Echo (TTE) Complete    Result Date: 7/11/2024   [facility], [address] [place] [address]                Tel [phone] and Fax [phone] TRANSTHORACIC ECHOCARDIOGRAM REPORT  Patient Name:      REGINE KALEIGH Valdes Physician:    [id number] Killian Martinez MD Study Date:        7/11/2024            Ordering Provider:    [id number] GERBER ARMENTA MRN/PID:           [id number]             Fellow: Accession#:        [id number]         Nurse: Date of Birth/Age: 1965 / 58 years Sonographer:          ANOOP Chavez RDCS Gender:            M                    Additional Staff: Height:            167.64 cm            Admit Date: Weight:            44.45 kg             Admission Status:     Outpatient BSA / BMI:         1.48 m2 / 15.82      Encounter#:           [id number]                    kg/m2 Blood Pressure:    141/92 mmHg          Department Location:  [facility] Echo Lab Study Type:    TRANSTHORACIC ECHO (TTE) COMPLETE Diagnosis/ICD: Syncope-R55 Indication:    Syncope CPT Code:      Echo Complete w Full [id number] Patient History: Pertinent History: HTN and Syncope. Study Detail: The following Echo studies were performed: 2D, M-Mode, Doppler and               color flow.  PHYSICIAN INTERPRETATION: Left Ventricle: Left ventricular ejection fraction is normal, calculated by Aranda's biplane at 61%. Estimated left ventricular mass is normal. There are no regional wall motion abnormalities. The left ventricular cavity size is normal. There is normal left ventricular posterior  wall thickness. Spectral Doppler shows a normal pattern of left ventricular diastolic filling. Left Atrium: The left atrium is normal in size. Right Ventricle: The right ventricle is normal in size. There is normal right ventricular global systolic function. Right Atrium: The right atrium is normal in size. The right atrium has a prominent Eustachean valve. Aortic Valve: The aortic valve is probably trileaflet. There is no aortic valve cusp calcification noted. There is no aortic valve thickening. The aortic valve dimensionless index is 0.81. There is mild aortic valve regurgitation. The peak instantaneous gradient of the aortic valve is 5.1 mmHg. The mean gradient of the aortic valve is 2.6 mmHg.  ms. Mitral Valve: The mitral valve is normal in structure. There is no evidence of mitral valve regurgitation. Tricuspid Valve: The tricuspid valve is structurally normal. There is mild tricuspid regurgitation. The Doppler estimated RVSP is within normal limits at 25.5 mmHg. Pulmonic Valve: The pulmonic valve is structurally normal. There is mild pulmonic valve regurgitation. Pericardium: There is no pericardial effusion noted. Aorta: The aortic root is normal. Systemic Veins: The inferior vena cava size appears small. There is IVC inspiratory collapse greater than 50%. In comparison to the previous echocardiogram(s): There are no prior studies on this patient for comparison purposes.  CONCLUSIONS:  1. Left ventricular ejection fraction is normal, calculated by Aranda's biplane at 61%.  2. There is normal right ventricular global systolic function.  3. Mild aortic valve regurgitation.  4. RVSP within normal limits.  5. The inferior vena cava size appears small. QUANTITATIVE DATA SUMMARY: 2D MEASUREMENTS:                          Normal Ranges: Ao Root d:     3.40 cm   (2.0-3.7cm) LAs:           2.22 cm   (2.7-4.0cm) RVIDd:         2.75 cm   (0.9-3.6cm) IVSd:          0.84 cm   (0.6-1.1cm) LVPWd:         0.57 cm    (0.6-1.1cm) LVIDd:         3.30 cm   (3.9-5.9cm) LVIDs:         1.72 cm LV Mass Index: 46.6 g/m2 LV % FS        47.9 % LA VOLUME:                               Normal Ranges: LA Vol A4C:        30.4 ml    (22+/-6mL/m2) LA Vol A2C:        14.1 ml LA Vol BP:         23.9 ml LA Vol Index A4C:  20.6ml/m2 LA Vol Index A2C:  9.5 ml/m2 LA Vol Index BP:   16.2 ml/m2 LA Area A4C:       10.7 cm2 LA Area A2C:       6.3 cm2 LA Major Axis A4C: 3.2 cm LA Major Axis A2C: 2.4 cm LA Volume Index:   16.0 ml/m2 M-MODE MEASUREMENTS:                  Normal Ranges: Ao Root: 2.80 cm (2.0-3.7cm) LAs:     3.18 cm (2.7-4.0cm) AORTA MEASUREMENTS:                      Normal Ranges: Ao Sinus, d: 3.68 cm (2.1-3.5cm) Asc Ao, d:   3.50 cm (2.1-3.4cm) LV SYSTOLIC FUNCTION BY 2D PLANIMETRY (MOD):                      Normal Ranges: EF-A4C View:    60 % (>=55%) EF-A2C View:    61 % EF-Biplane:     61 % LV EF Reported: 61 % LV DIASTOLIC FUNCTION:                               Normal Ranges: MV Peak E:        0.32 m/s    (0.7-1.2 m/s) MV Peak A:        0.68 m/s    (0.42-0.7 m/s) E/A Ratio:        0.47        (1.0-2.2) MV e'             0.095 m/s   (>8.0) MV lateral e'     0.13 m/s MV medial e'      0.06 m/s MV A Dur:         117.65 msec E/e' Ratio:       3.39        (<8.0) a'                0.08 m/s PulmV Sys Jesus:    42.38 cm/s PulmV Pulido Jesus:   21.62 cm/s PulmV S/D Jesus:    1.96 PulmV A Revs Jesus: 31.49 cm/s PulmV A Revs Dur: 76.12 msec MITRAL VALVE:                 Normal Ranges: MV DT: 106 msec (150-240msec) AORTIC VALVE:                                   Normal Ranges: AoV Vmax:                1.13 m/s (<=1.7m/s) AoV Peak P.1 mmHg (<20mmHg) AoV Mean P.6 mmHg (1.7-11.5mmHg) LVOT Max Jesus:            0.89 m/s (<=1.1m/s) AoV VTI:                 18.45 cm (18-25cm) LVOT VTI:                14.89 cm LVOT Diameter:           1.95 cm  (1.8-2.4cm) AoV Area, VTI:           2.41 cm2 (2.5-5.5cm2) AoV Area,Vmax:           2.37  cm2 (2.5-4.5cm2) AoV Dimensionless Index: 0.81 AORTIC INSUFFICIENCY: AI Vmax:       4.60 m/s AI Half-time:  798 msec AI Decel Time: 2751 msec AI Decel Rate: 167.82 cm/s2  RIGHT VENTRICLE: RV Basal 3.40 cm RV Mid   2.80 cm RV Major 6.3 cm TAPSE:   21.0 mm RV s'    0.12 m/s TRICUSPID VALVE/RVSP:                             Normal Ranges: Peak TR Velocity: 2.37 m/s Est. RA Pressure: 3 mmHg RV Syst Pressure: 25.5 mmHg (< 30mmHg) IVC Diam:         0.07 cm PULMONIC VALVE:                         Normal Ranges: PV Accel Time: 95 msec  (>120ms) PV Max Jesus:    0.7 m/s  (0.6-0.9m/s) PV Max P.9 mmHg Pulmonary Veins: PulmV A Revs Dur: 76.12 msec PulmV A Revs Jesus: 31.49 cm/s PulmV Pulido Jesus:   21.62 cm/s PulmV S/D Jesus:    1.96 PulmV Sys Jesus:    42.38 cm/s AORTA: Asc Ao Diam 3.53 cm  [id number] Killian Martinez MD Electronically signed on 2024 at 5:22:48 PM  ** Final **              Malnutrition Diagnosis Status: New  Malnutrition Diagnosis: Severe malnutrition related to chronic disease or condition  As Evidenced by: pt with a decrease in PO intake for the last two months with at least a 27% weight loss (possibly more) with noted severe fat and muscle loss on physical exam  I agree with the dietitian's malnutrition diagnosis.    CT angio chest for pulmonary embolism 07/10/2024    Narrative  Interpreted By:  Mariel Alicia,  STUDY:  CT ANGIO CHEST FOR PULMONARY EMBOLISM;  7/10/2024 2:54 pm    INDICATION:  Signs/Symptoms:syncope, hypoxia, wt loss.    COMPARISON:  None    ACCESSION NUMBER(S):  [id number]    ORDERING CLINICIAN:  MARCIA FALCON    TECHNIQUE:  Helical data acquisition of the chest was obtained after intravenous  administration of 80 cc of Omnipaque 350 , as per PE protocol. Images  were reformatted in coronal and sagittal planes. Axial and coronal  maximum intensity projection (MIP) images were created and reviewed.    FINDINGS:  POTENTIAL LIMITATIONS OF THE STUDY: None    HEART AND VESSELS:  There are  no discrete filling defects within main pulmonary artery  and its branches to suggest acute pulmonary embolism. Main pulmonary  artery and its branches are normal in caliber.    The thoracic aorta normal in course and caliber.  No coronary artery calcifications are seen. Please note, the study is  not optimized for evaluation of coronary arteries.    The cardiac chambers are not enlarged.    There is no pericardial effusion seen.    MEDIASTINUM AND ARNAUD, LOWER NECK AND AXILLA:  The visualized thyroid gland is within normal limits.  No evidence of thoracic lymphadenopathy by CT criteria.  Esophagus is patulous with some ingested material.    LUNGS AND AIRWAYS:  The trachea and central airways are patent. No endobronchial lesion  is seen.    The bilateral lungs are clear without evidence of focal  consolidation, pleural effusion, or pneumothorax.    Mild upper lung predominant emphysema    4 mm left upper lobe nodule, image 61/352.      UPPER ABDOMEN:  The visualized subdiaphragmatic structures demonstrate no remarkable  findings.        CHEST WALL AND OSSEOUS STRUCTURES:  Chest wall is within normal limits.  No acute osseous pathology.There are no suspicious osseous lesions.    Partially imaged fat density lesion in the left flank measuring at  least 7.2 x 3.3 cm, likely a lipoma.    Impression  1. No evidence of acute pulmonary embolism.  2. Mild upper lung predominant emphysema.  3. 4 mm left upper lobe nodule as described above.  Instructions:  No further follow-up is required, however, if the  patient has high risk factors for primary lung malignancy, follow-up  noncontrast CT scan chest in 12 months may be obtained. (Manuel Pope et al., Guidelines for management of incidental pulmonary  nodules detected on CT images: From the Fleischner Society 2017,  Radiology. 2017 Jul;284 (1):228-243.) [email or web address]  4. Patulous esophagus with debris and ingested material. Correlate  with motility disorder and  reflux.  5. Partially imaged fat density lesion in the left flank, likely a  lipoma.    MACRO:  None    Signed by: Mariel Hatch 7/10/2024 3:25 PM  Dictation workstation:   [id number]      Assessment/Plan   Principal Problem:    Failure to thrive in adult  Active Problems:    HTN (hypertension)    Joe Alaniz is a 58 y.o. with a hx of HTN, BPH, left inguinal hernia, etoh (quit may 2024) and PUD (required surgery in past many years ago) admitted with persistent N/V, 50lb Wt loss, and failure to thrive.     Neuro:  #Hx etoh use (last drink may 2024)  #Acute pain 2/2 tooth fracture/decay  - A&Ox3  - Pain reg: Tylenol PRN, Benzocaine-menthol lozenges prn, MBX mouth wash prn, and tramadol 50mg q6 hrs severe pain  - PT/OT/OOB  - Delirium Precautions    Dental:  #Tooth fracture vs decay  - Panorex completed, read pending   - Dental consult, recs appreciated         > Saw patient 7/11, follow up with recs   - OMFS consult, recs appreciated          > Seen 7/12, does not feel any teeth need removed while inpatient.  Recommending follow up as o/p   - Pain control as above     CV:  #Hx HTN  #Syncopal episode likely from dehydration   #Prolonged Qtc, 636 on admission  - HDS  - Continue home Amlodipine   - TTE 7/11: Left ventricular ejection fraction is normal, calculated by Aranda's biplane at 61%.  Normal RV systolic function  - Qtc 7/12: 431  - Monitor on tele        Pulm:  #Hx Former smoker (quit May)  #AHRF, resolved  - Stable on RA  - IS while awake  - CTPE 7/10: No evidence of acute PE  - No PFTs on file, will need pulm follow up at discharge     GI/FEN:  #Persistnet N/V with possible GIST on imaging  #C/f Hematemesis prior to admission   #Severe Protein calori malnutrition with subjective 50lb wt loss  #Stomatitis/esophagitis 2/2 persistent n/v  #Risk for refeeding syndrome   #HypoK, Hypophos, hypomag   - CT A/P with contrast 7/10: 2x1.5cm mass within posterior wall of gasttric fundus c/f underlying neoplasm.  C/f esophagitis.  Diverticulosis without acute diverticulitis.  Left inguinal hernial without obstruction, decreased in size when compared to CT on 7/2  - Diet: Regular Diet, Soft Easy to chew   - Continue PPI   - CA 19-9 Pending   - GI consulted         > Will follow up on biopsies taken during EGD 07/12 but EGD findings are suggestive of a GIST, most likely will just need outpatient follow-up in the GI clinic due to size (<3cm) but will wait on biopsy for need of any further intervention          > Continue IV PPI BID          > Will need colonoscopy as o/p          > Will need follow up outpatient with Surg Onc and GI  - Replace K, mag and phos, check afternoon labs.   - BID RFP/Mag    Renal:  #Hx BPH  #Vit D Deficiency  - Continue IVF will stop today once patient has adequate oral intake   - Continue home Flomax  - Continue Vit D supplementation weekly     Endo: No acute issues  - TSH WNL  - HgbA1C 5.8 7/10/24     Heme:  #Normocytic anemia  - Iron studies pending   - SubQ heparin  - Daily CBC     ID:  #Elevated Lactate, sepsis r/o.    - Lactate resolved with IVF  - Micro: 7/10 BC x2 NGTD, HIV Neg, Covid Neg (7/2- HIV Neg, Syphilis Total Ab neg, Group A strep neg, Flu/RSV/Covid Neg, HCV neg).   - Monitor off atb        PPX:  DVT: scds   GI: PPI    Access/Lines: PIV    Code Status: Full code   NOK: Too Alaniz, [phone]    Dispo: Transfer to [facility]     Pt discussed with Dr. Mosqueda, seen and examined. All labs, VS and previous plan of care reviewed.        Ayaz Holly, APRN-CNP

## 2024-07-14 VITALS
RESPIRATION RATE: 18 BRPM | SYSTOLIC BLOOD PRESSURE: 128 MMHG | WEIGHT: 98 LBS | BODY MASS INDEX: 15.75 KG/M2 | HEART RATE: 93 BPM | DIASTOLIC BLOOD PRESSURE: 91 MMHG | OXYGEN SATURATION: 100 % | HEIGHT: 66 IN | TEMPERATURE: 97.7 F

## 2024-07-14 LAB
ALBUMIN SERPL BCP-MCNC: 3.1 G/DL (ref 3.4–5)
ALBUMIN SERPL BCP-MCNC: 3.2 G/DL (ref 3.4–5)
ANION GAP SERPL CALC-SCNC: 12 MMOL/L (ref 10–20)
ANION GAP SERPL CALC-SCNC: 13 MMOL/L (ref 10–20)
ATRIAL RATE: 70 BPM
BACTERIA BLD CULT: NORMAL
BACTERIA BLD CULT: NORMAL
BASOPHILS # BLD AUTO: 0.02 X10*3/UL (ref 0–0.1)
BASOPHILS NFR BLD AUTO: 0.3 %
BUN SERPL-MCNC: 5 MG/DL (ref 6–23)
BUN SERPL-MCNC: 5 MG/DL (ref 6–23)
CALCIUM SERPL-MCNC: 7.3 MG/DL (ref 8.6–10.6)
CALCIUM SERPL-MCNC: 7.5 MG/DL (ref 8.6–10.6)
CHLORIDE SERPL-SCNC: 100 MMOL/L (ref 98–107)
CHLORIDE SERPL-SCNC: 101 MMOL/L (ref 98–107)
CO2 SERPL-SCNC: 26 MMOL/L (ref 21–32)
CO2 SERPL-SCNC: 27 MMOL/L (ref 21–32)
CREAT SERPL-MCNC: 0.68 MG/DL (ref 0.5–1.3)
CREAT SERPL-MCNC: 0.74 MG/DL (ref 0.5–1.3)
EGFRCR SERPLBLD CKD-EPI 2021: >90 ML/MIN/1.73M*2
EGFRCR SERPLBLD CKD-EPI 2021: >90 ML/MIN/1.73M*2
EOSINOPHIL # BLD AUTO: 0.07 X10*3/UL (ref 0–0.7)
EOSINOPHIL NFR BLD AUTO: 1 %
ERYTHROCYTE [DISTWIDTH] IN BLOOD BY AUTOMATED COUNT: 12.3 % (ref 11.5–14.5)
GLUCOSE SERPL-MCNC: 122 MG/DL (ref 74–99)
GLUCOSE SERPL-MCNC: 91 MG/DL (ref 74–99)
HCT VFR BLD AUTO: 33.2 % (ref 41–52)
HGB BLD-MCNC: 11.3 G/DL (ref 13.5–17.5)
IMM GRANULOCYTES # BLD AUTO: 0.03 X10*3/UL (ref 0–0.7)
IMM GRANULOCYTES NFR BLD AUTO: 0.4 % (ref 0–0.9)
LYMPHOCYTES # BLD AUTO: 1.85 X10*3/UL (ref 1.2–4.8)
LYMPHOCYTES NFR BLD AUTO: 27.2 %
MAGNESIUM SERPL-MCNC: 1.79 MG/DL (ref 1.6–2.4)
MAGNESIUM SERPL-MCNC: 2.2 MG/DL (ref 1.6–2.4)
MCH RBC QN AUTO: 32.7 PG (ref 26–34)
MCHC RBC AUTO-ENTMCNC: 34 G/DL (ref 32–36)
MCV RBC AUTO: 96 FL (ref 80–100)
MONOCYTES # BLD AUTO: 1.22 X10*3/UL (ref 0.1–1)
MONOCYTES NFR BLD AUTO: 17.9 %
NEUTROPHILS # BLD AUTO: 3.61 X10*3/UL (ref 1.2–7.7)
NEUTROPHILS NFR BLD AUTO: 53.2 %
NRBC BLD-RTO: 0 /100 WBCS (ref 0–0)
P AXIS: 73 DEGREES
P OFFSET: 192 MS
P ONSET: 151 MS
PHOSPHATE SERPL-MCNC: 2.1 MG/DL (ref 2.5–4.9)
PHOSPHATE SERPL-MCNC: 2.9 MG/DL (ref 2.5–4.9)
PLATELET # BLD AUTO: 405 X10*3/UL (ref 150–450)
POTASSIUM SERPL-SCNC: 3.5 MMOL/L (ref 3.5–5.3)
POTASSIUM SERPL-SCNC: 4 MMOL/L (ref 3.5–5.3)
PR INTERVAL: 146 MS
Q ONSET: 224 MS
QRS COUNT: 12 BEATS
QRS DURATION: 80 MS
QT INTERVAL: 428 MS
QTC CALCULATION(BAZETT): 462 MS
QTC FREDERICIA: 450 MS
R AXIS: -34 DEGREES
RBC # BLD AUTO: 3.46 X10*6/UL (ref 4.5–5.9)
SODIUM SERPL-SCNC: 135 MMOL/L (ref 136–145)
SODIUM SERPL-SCNC: 136 MMOL/L (ref 136–145)
T AXIS: -26 DEGREES
T OFFSET: 438 MS
VENTRICULAR RATE: 70 BPM
WBC # BLD AUTO: 6.8 X10*3/UL (ref 4.4–11.3)

## 2024-07-14 PROCEDURE — 80069 RENAL FUNCTION PANEL: CPT

## 2024-07-14 PROCEDURE — 99233 SBSQ HOSP IP/OBS HIGH 50: CPT

## 2024-07-14 PROCEDURE — 83735 ASSAY OF MAGNESIUM: CPT

## 2024-07-14 PROCEDURE — 2500000004 HC RX 250 GENERAL PHARMACY W/ HCPCS (ALT 636 FOR OP/ED)

## 2024-07-14 PROCEDURE — 2500000001 HC RX 250 WO HCPCS SELF ADMINISTERED DRUGS (ALT 637 FOR MEDICARE OP)

## 2024-07-14 PROCEDURE — 2500000005 HC RX 250 GENERAL PHARMACY W/O HCPCS

## 2024-07-14 PROCEDURE — 36415 COLL VENOUS BLD VENIPUNCTURE: CPT

## 2024-07-14 PROCEDURE — 1200000002 HC GENERAL ROOM WITH TELEMETRY DAILY

## 2024-07-14 PROCEDURE — 85025 COMPLETE CBC W/AUTO DIFF WBC: CPT

## 2024-07-14 PROCEDURE — 97530 THERAPEUTIC ACTIVITIES: CPT | Mod: GP | Performed by: PHYSICAL THERAPIST

## 2024-07-14 PROCEDURE — 97161 PT EVAL LOW COMPLEX 20 MIN: CPT | Mod: GP | Performed by: PHYSICAL THERAPIST

## 2024-07-14 RX ORDER — OXYCODONE HYDROCHLORIDE 5 MG/1
5 TABLET ORAL EVERY 6 HOURS PRN
Status: DISCONTINUED | OUTPATIENT
Start: 2024-07-14 | End: 2024-07-16 | Stop reason: HOSPADM

## 2024-07-14 RX ORDER — CALCIUM CARBONATE 200(500)MG
500 TABLET,CHEWABLE ORAL ONCE
Status: COMPLETED | OUTPATIENT
Start: 2024-07-14 | End: 2024-07-14

## 2024-07-14 RX ORDER — MAGNESIUM SULFATE HEPTAHYDRATE 40 MG/ML
2 INJECTION, SOLUTION INTRAVENOUS ONCE
Status: COMPLETED | OUTPATIENT
Start: 2024-07-14 | End: 2024-07-14

## 2024-07-14 RX ORDER — POTASSIUM CHLORIDE 1.5 G/1.58G
40 POWDER, FOR SOLUTION ORAL ONCE
Status: COMPLETED | OUTPATIENT
Start: 2024-07-14 | End: 2024-07-14

## 2024-07-14 RX ORDER — AMOXICILLIN 250 MG
1 CAPSULE ORAL NIGHTLY
Status: DISCONTINUED | OUTPATIENT
Start: 2024-07-14 | End: 2024-07-16 | Stop reason: HOSPADM

## 2024-07-14 ASSESSMENT — ACTIVITIES OF DAILY LIVING (ADL): ADL_ASSISTANCE: INDEPENDENT

## 2024-07-14 ASSESSMENT — COGNITIVE AND FUNCTIONAL STATUS - GENERAL
STANDING UP FROM CHAIR USING ARMS: A LITTLE
MOBILITY SCORE: 18
MOVING FROM LYING ON BACK TO SITTING ON SIDE OF FLAT BED WITH BEDRAILS: A LITTLE
MOVING TO AND FROM BED TO CHAIR: A LITTLE
WALKING IN HOSPITAL ROOM: A LITTLE
CLIMB 3 TO 5 STEPS WITH RAILING: A LITTLE
TURNING FROM BACK TO SIDE WHILE IN FLAT BAD: A LITTLE

## 2024-07-14 ASSESSMENT — PAIN - FUNCTIONAL ASSESSMENT: PAIN_FUNCTIONAL_ASSESSMENT: 0-10

## 2024-07-14 ASSESSMENT — PAIN SCALES - GENERAL
PAINLEVEL_OUTOF10: 10 - WORST POSSIBLE PAIN
PAINLEVEL_OUTOF10: 10 - WORST POSSIBLE PAIN
PAINLEVEL_OUTOF10: 0 - NO PAIN

## 2024-07-14 ASSESSMENT — PAIN DESCRIPTION - LOCATION: LOCATION: MOUTH

## 2024-07-14 NOTE — PROGRESS NOTES
Physical Therapy    Physical Therapy Evaluation & Treatment    Patient Name: Joe Alaniz  MRN: [id number]  Today's Date: 7/14/2024   Time Calculation  Start Time: 1402  Stop Time: 1426  Time Calculation (min): 24 min    Assessment/Plan   PT Assessment  PT Assessment Results: Decreased strength, Decreased endurance, Impaired balance, Decreased mobility  Rehab Prognosis: Good  End of Session Communication: Bedside nurse  End of Session Patient Position: Bed, 4 rail up, Alarm on   IP OR SWING BED PT PLAN  Inpatient or Swing Bed: Inpatient  PT Plan  Treatment/Interventions: Bed mobility, Transfer training, Balance training, Gait training, Strengthening, Endurance training, Therapeutic exercise, Therapeutic activity  PT Plan: Ongoing PT  PT Frequency: 3 times per week  PT Discharge Recommendations: Low intensity level of continued care  PT Recommended Transfer Status: Assist x1  PT - OK to Discharge: Yes      Subjective     General Visit Information:  General  Reason for Referral: presented for FTT (~50 lb weight loss and n/v 3x daily for >1 mo), found to have gastric mass on CTAP now 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  Past Medical History Relevant to Rehab: HTN, BPH, left inguinal hernia and surgery for PUD many years ago  Prior to Session Communication: Bedside nurse  Patient Position Received: Bed, 3 rail up, Alarm on  Preferred Learning Style: verbal  General Comment: Pt supine in bed upon arrival, pleasant and willing to partiicpate in PT session  Home Living:  Home Living  Type of Home:  (pt reports plans to DC to friend's home intially)  Lives With: Alone (plan to DC to friend's house, pt reprts friend works, friend's adult son will be there, reports someone will be home all the time)  Home Adaptive Equipment: Cane  Home Layout: Able to live on main level with bedroom/bathroom, Laundry main level  Home Access: Level entry (no steps at frined's house, 7 steps at pt's house with 1 handrail)  Prior  Level of Function:  Prior Function Per Pt/Caregiver Report  Level of Lewis: Independent with ADLs and functional transfers, Independent with homemaking with ambulation  ADL Assistance: Independent  Ambulatory Assistance: Independent (PRN use of cane)  Hand Dominance: Right  Prior Function Comments: +drives, 1 fall in the past 6 months  Precautions:  Precautions  Medical Precautions: Fall precautions  Vital Signs:  Vital Signs  Heart Rate: 85  SpO2: 97 %  BP: 127/90  Patient Position: Sitting    Objective   Pain:  Pain Assessment  Pain Assessment: 0-10  0-10 (Numeric) Pain Score: 0 - No pain  Cognition:  Cognition  Orientation Level: Oriented X4    General Assessments:      Sensation  Light Touch: No apparent deficits    Strength  Strength Comments: BLE's 4/5             Static Sitting Balance  Static Sitting-Level of Assistance:  (Supervision)  Dynamic Sitting Balance  Dynamic Sitting-Balance:  (SBA)    Static Standing Balance  Static Standing-Level of Assistance:  (SBA)  Dynamic Standing Balance  Dynamic Standing-Balance:  (CGA)  Functional Assessments:  Bed Mobility  Bed Mobility: Yes  Bed Mobility 1  Bed Mobility 1: Supine to sitting, Sitting to supine  Level of Assistance 1:  (SBA)  Bed Mobility Comments 1: HOB elevated, bedrail    Transfers  Transfer: Yes  Transfer 1  Transfer From 1: Sit to, Stand to  Transfer to 1: Stand, Sit  Technique 1: Sit to stand, Stand to sit  Transfer Device 1:  (IV pole)  Transfer Level of Assistance 1: Contact guard, Minimal verbal cues  Trials/Comments 1: cues for safe hand placement    Ambulation/Gait Training  Ambulation/Gait Training Performed: Yes  Ambulation/Gait Training 1  Surface 1: Level tile  Device 1:  (IV pole)  Assistance 1: Contact guard, Minimal verbal cues  Quality of Gait 1: Decreased step length (slowed velocity)  Comments/Distance (ft) 1: ~80 ft (cues for taking time netween positional chnages and use of cane)       Extremity/Trunk Assessments:    RUE  RUE:Within Functional Limts  LUE   LUE: Within Functional Limits  RLE   RLE : Within Functional Limits  LLE   LLE : Within Functional Limits  Treatments:  Therapeutic Exercise  Therapeutic Exercise Performed: Yes  Therapeutic Exercise Activity 1: Seated BLE: AP, LAQ 1x10 reps. Cued for techniques.    Ambulation/Gait Training  Ambulation/Gait Training Performed: Yes  Ambulation/Gait Training 1  Surface 1: Level tile  Device 1:  (IV pole)  Assistance 1: Contact guard, Minimal verbal cues  Quality of Gait 1: Decreased step length (slowed velocity)  Comments/Distance (ft) 1: ~80 ft (cues for taking time netween positional chnages and use of cane)  Transfers  Transfer: Yes  Transfer 1  Transfer From 1: Sit to, Stand to  Transfer to 1: Stand, Sit  Technique 1: Sit to stand, Stand to sit  Transfer Device 1:  (IV pole)  Transfer Level of Assistance 1: Contact guard, Minimal verbal cues  Trials/Comments 1: cues for safe hand placement       Outcome Measures:  [facility] Basic Mobility  Turning from your back to your side while in a flat bed without using bedrails: A little  Moving from lying on your back to sitting on the side of a flat bed without using bedrails: A little  Moving to and from bed to chair (including a wheelchair): A little  Standing up from a chair using your arms (e.g. wheelchair or bedside chair): A little  To walk in hospital room: A little  Climbing 3-5 steps with railing: A little  Basic Mobility - Total Score: 18    Encounter Problems       Encounter Problems (Active)       Balance       Pt will score >/=24/28 on Tinetti to demonstrate decreased falls risk (Progressing)       Start:  07/14/24    Expected End:  07/28/24               Mobility       Pt will be Stephen ambulation 200 ft with LRAD (Progressing)       Start:  07/14/24    Expected End:  07/28/24               PT Transfers       Pt will be Stephen with sit to stand and bed to chair transfers with LRAD (Progressing)       Start:  07/14/24     Expected End:  07/28/24            Pt will be Stephen with bed mobility (Progressing)       Start:  07/14/24    Expected End:  07/28/24               Pain - Adult              Education Documentation  Precautions, taught by Amy Garcia PT at 7/14/2024  2:42 PM.  Learner: Patient  Readiness: Acceptance  Method: Explanation  Response: Needs Reinforcement, Verbalizes Understanding    Mobility Training, taught by Amy Garcia PT at 7/14/2024  2:42 PM.  Learner: Patient  Readiness: Acceptance  Method: Explanation  Response: Needs Reinforcement, Verbalizes Understanding    Education Comments  No comments found.

## 2024-07-14 NOTE — PROGRESS NOTES
"Joe Alaniz is a 58 y.o. male on day 4 of admission presenting with Failure to thrive in adult.    Subjective   Required K and phos repletion overnight. Pt doing well this morning stated that he was able to eat some mashed potatoes last night. Still no BM but will take laxative if unable to have BM after breakfast. Endorsing some pain and numbness in R 3,4,5 toe but says this has resolved now. No CP, SOB, n/v.       Objective     Physical Exam  General: cachectic, NAD  HEENT: NCAT, EOMI, no icterus  Cardiac: RRR, no MRG  Pulm: symmetric chest expansion, CTAB  GI: ABS, NTND, tender L inguinal hernia without overlying skin changes  Extremities: no pedal edema  MSK: no joint swelling  Skin: no bruising or erythema  Neuro: Aox3, CN II-XII grossly intact, 5/5 strength BLE, sensation symmetric  Psych: appropriate mood and affect    Last Recorded Vitals  Blood pressure 117/81, pulse 82, temperature 36.8 °C (98.2 °F), resp. rate 18, height 1.676 m (5' 6\"), weight (!) 44.5 kg (98 lb), SpO2 98%.  Intake/Output last 3 Shifts:  I/O last 3 completed shifts:  In: 1628.3 (36.6 mL/kg) [I.V.:1278.3 (28.8 mL/kg); IV Piggyback:350]  Out: 1500 (33.7 mL/kg) [Urine:1500 (0.9 mL/kg/hr)]  Weight: 44.5 kg     Relevant Results              Scheduled medications  amLODIPine, 10 mg, oral, Daily  calcium carbonate, 1,000 mg, oral, Daily  cyanocobalamin, 1,000 mcg, oral, Daily  enoxaparin, 40 mg, subcutaneous, Daily  ergocalciferol, 50,000 Units, oral, Every Sunday  ferrous sulfate (325 mg ferrous sulfate), 65 mg of iron, oral, Daily with breakfast  folic acid, 1 mg, oral, Daily  melatonin, 10 mg, oral, Nightly  multivitamin with minerals, 1 tablet, oral, Daily  pantoprazole, 40 mg, oral, BID AC  polyethylene glycol, 17 g, oral, Daily  thiamine, 100 mg, intravenous, Daily      Continuous medications     PRN medications  PRN medications: acetaminophen, benzocaine, benzocaine-menthol, lidocaine-diphenhydraMINE-Maalox 1:1:1, ondansetron, " traMADol            Malnutrition Diagnosis Status: New  Malnutrition Diagnosis: Severe malnutrition related to chronic disease or condition  As Evidenced by: pt with a decrease in PO intake for the last two months with at least a 27% weight loss (possibly more) with noted severe fat and muscle loss on physical exam  I agree with the dietitian's malnutrition diagnosis.      Assessment/Plan   Principal Problem:    Failure to thrive in adult  Active Problems:    HTN (hypertension)    Joe Alaniz is a 58 y.o. 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 Stable for transfer to floor working on advancing diet and monitoring for refeeding syndrome s/p D5NS and initiation of diet.    Updates 7/15:    #FTT  #Gastric mass, bx pending  :: CT A/P with contrast 7/10: 2x1.5cm mass within posterior wall of gasttric fundus c/f underlying neoplasm. C/f esophagitis. Diverticulosis without acute diverticulitis. Left inguinal hernial without obstruction, decreased in size when compared to CT on 7/2   :: EGD 7/12 showed patchy esophagitis, submucosal mass 18 mm x 16 mm in fundus (fine needle bx pending), atrophic and erythematous mucosa of fundus (cold forceps bx performed)  :: CEA wnl  - Likely GIST, will need GI and surg onc follow up  - Bx pending  - Cold forceps bx for H. Pylori pending  - CA 19-9 pending  - c/w pantoprazole 40 mg PO BID    #Electrolyte derangements  #C/f refeeding syndrome  :: K 2.1 on admission -> 3.5 upon transfer  :: Mg wnl on admission -> 1.48 upon transfer  :: Phos wnl on admission -> 1.9 upon transfer  :: Ca wnl on admission -> 7.1 upon transfer (8.2 corrected for albumin of 3.0)  - Continue to aggressively replete K>4, Mg>2, Phos>3  - BID RFP/Mg  - Continue soft diet    #Malnutrition  - c/w folic acid,  thiamine  - c/w weekly vitamin D supplementation  - c/w B12 and MVI    #Poor dentition  :: OMFS and dental consulted, will perform tooth extraction outpatient  - c/w oral care    #HTN  :: Has had soft BP during hospital stay  - c/w amlodipine 10 mg PO daily    #Iron deficiency anemia  :: 15.8 on admission -> 11.2 on transfer  :: Fe 32, TIBC 196  - Ferous sulfate 325 mg PO x1 to be given 7/14 AM    #BPH  - holding home tamsulosin, pt said not taking    F: replete PRN  E: aggressively replete K>4, Mg>2, Phos>3  N: soft diet, advance slowly  GI: PPI, PEG    DVT: lovenox    Access: pIV    Pain regimen: tylenol 650 q4 PRN    Dispo: PT/OT pending    Code Status: Full code   NOK: Too Lewis, [phone]             Ronda Odom MD

## 2024-07-15 ENCOUNTER — APPOINTMENT (OUTPATIENT)
Dept: GASTROENTEROLOGY | Facility: CLINIC | Age: 59
End: 2024-07-15
Payer: COMMERCIAL

## 2024-07-15 ENCOUNTER — DOCUMENTATION (OUTPATIENT)
Dept: RESEARCH | Age: 59
End: 2024-07-15

## 2024-07-15 LAB
ALBUMIN SERPL BCP-MCNC: 3.1 G/DL (ref 3.4–5)
ALBUMIN SERPL BCP-MCNC: 3.5 G/DL (ref 3.4–5)
ANION GAP SERPL CALC-SCNC: 13 MMOL/L (ref 10–20)
ANION GAP SERPL CALC-SCNC: 14 MMOL/L (ref 10–20)
ATRIAL RATE: 80 BPM
ATRIAL RATE: 83 BPM
BASOPHILS # BLD AUTO: 0.02 X10*3/UL (ref 0–0.1)
BASOPHILS NFR BLD AUTO: 0.3 %
BUN SERPL-MCNC: 6 MG/DL (ref 6–23)
BUN SERPL-MCNC: 7 MG/DL (ref 6–23)
CA-I BLD-SCNC: 1.09 MMOL/L (ref 1.1–1.33)
CALCIUM SERPL-MCNC: 7.8 MG/DL (ref 8.6–10.6)
CALCIUM SERPL-MCNC: 8.4 MG/DL (ref 8.6–10.6)
CHLORIDE SERPL-SCNC: 101 MMOL/L (ref 98–107)
CHLORIDE SERPL-SCNC: 102 MMOL/L (ref 98–107)
CO2 SERPL-SCNC: 27 MMOL/L (ref 21–32)
CO2 SERPL-SCNC: 27 MMOL/L (ref 21–32)
CREAT SERPL-MCNC: 0.79 MG/DL (ref 0.5–1.3)
CREAT SERPL-MCNC: 0.82 MG/DL (ref 0.5–1.3)
EGFRCR SERPLBLD CKD-EPI 2021: >90 ML/MIN/1.73M*2
EGFRCR SERPLBLD CKD-EPI 2021: >90 ML/MIN/1.73M*2
EOSINOPHIL # BLD AUTO: 0.08 X10*3/UL (ref 0–0.7)
EOSINOPHIL NFR BLD AUTO: 1.3 %
ERYTHROCYTE [DISTWIDTH] IN BLOOD BY AUTOMATED COUNT: 12.3 % (ref 11.5–14.5)
GLUCOSE SERPL-MCNC: 88 MG/DL (ref 74–99)
GLUCOSE SERPL-MCNC: 93 MG/DL (ref 74–99)
HCT VFR BLD AUTO: 33.1 % (ref 41–52)
HGB BLD-MCNC: 11.2 G/DL (ref 13.5–17.5)
IMM GRANULOCYTES # BLD AUTO: 0.02 X10*3/UL (ref 0–0.7)
IMM GRANULOCYTES NFR BLD AUTO: 0.3 % (ref 0–0.9)
LYMPHOCYTES # BLD AUTO: 1.33 X10*3/UL (ref 1.2–4.8)
LYMPHOCYTES NFR BLD AUTO: 22.2 %
MAGNESIUM SERPL-MCNC: 1.73 MG/DL (ref 1.6–2.4)
MAGNESIUM SERPL-MCNC: 1.85 MG/DL (ref 1.6–2.4)
MCH RBC QN AUTO: 32.6 PG (ref 26–34)
MCHC RBC AUTO-ENTMCNC: 33.8 G/DL (ref 32–36)
MCV RBC AUTO: 96 FL (ref 80–100)
MONOCYTES # BLD AUTO: 1.05 X10*3/UL (ref 0.1–1)
MONOCYTES NFR BLD AUTO: 17.5 %
NEUTROPHILS # BLD AUTO: 3.5 X10*3/UL (ref 1.2–7.7)
NEUTROPHILS NFR BLD AUTO: 58.4 %
NRBC BLD-RTO: 0 /100 WBCS (ref 0–0)
P AXIS: 68 DEGREES
P AXIS: 71 DEGREES
P OFFSET: 186 MS
P OFFSET: 192 MS
P ONSET: 139 MS
P ONSET: 145 MS
PHOSPHATE SERPL-MCNC: 2.6 MG/DL (ref 2.5–4.9)
PHOSPHATE SERPL-MCNC: 3 MG/DL (ref 2.5–4.9)
PLATELET # BLD AUTO: 442 X10*3/UL (ref 150–450)
POTASSIUM SERPL-SCNC: 4.4 MMOL/L (ref 3.5–5.3)
POTASSIUM SERPL-SCNC: 4.5 MMOL/L (ref 3.5–5.3)
PR INTERVAL: 148 MS
PR INTERVAL: 158 MS
Q ONSET: 213 MS
Q ONSET: 224 MS
QRS COUNT: 13 BEATS
QRS COUNT: 14 BEATS
QRS DURATION: 78 MS
QRS DURATION: 88 MS
QT INTERVAL: 372 MS
QT INTERVAL: 434 MS
QTC CALCULATION(BAZETT): 437 MS
QTC CALCULATION(BAZETT): 500 MS
QTC FREDERICIA: 414 MS
QTC FREDERICIA: 478 MS
R AXIS: -37 DEGREES
R AXIS: -42 DEGREES
RBC # BLD AUTO: 3.44 X10*6/UL (ref 4.5–5.9)
SODIUM SERPL-SCNC: 137 MMOL/L (ref 136–145)
SODIUM SERPL-SCNC: 138 MMOL/L (ref 136–145)
T AXIS: 1 DEGREES
T AXIS: 5 DEGREES
T OFFSET: 399 MS
T OFFSET: 441 MS
VENTRICULAR RATE: 80 BPM
VENTRICULAR RATE: 83 BPM
WBC # BLD AUTO: 6 X10*3/UL (ref 4.4–11.3)

## 2024-07-15 PROCEDURE — 2500000004 HC RX 250 GENERAL PHARMACY W/ HCPCS (ALT 636 FOR OP/ED)

## 2024-07-15 PROCEDURE — 82960 TEST FOR G6PD ENZYME: CPT

## 2024-07-15 PROCEDURE — 2500000001 HC RX 250 WO HCPCS SELF ADMINISTERED DRUGS (ALT 637 FOR MEDICARE OP)

## 2024-07-15 PROCEDURE — 2500000005 HC RX 250 GENERAL PHARMACY W/O HCPCS

## 2024-07-15 PROCEDURE — 83735 ASSAY OF MAGNESIUM: CPT

## 2024-07-15 PROCEDURE — 85025 COMPLETE CBC W/AUTO DIFF WBC: CPT

## 2024-07-15 PROCEDURE — 82330 ASSAY OF CALCIUM: CPT

## 2024-07-15 PROCEDURE — 80069 RENAL FUNCTION PANEL: CPT

## 2024-07-15 PROCEDURE — 1200000002 HC GENERAL ROOM WITH TELEMETRY DAILY

## 2024-07-15 PROCEDURE — 99233 SBSQ HOSP IP/OBS HIGH 50: CPT

## 2024-07-15 PROCEDURE — 36415 COLL VENOUS BLD VENIPUNCTURE: CPT

## 2024-07-15 RX ORDER — LOPERAMIDE HYDROCHLORIDE 2 MG/1
2 CAPSULE ORAL 4 TIMES DAILY PRN
Status: DISCONTINUED | OUTPATIENT
Start: 2024-07-15 | End: 2024-07-16 | Stop reason: HOSPADM

## 2024-07-15 ASSESSMENT — COGNITIVE AND FUNCTIONAL STATUS - GENERAL
DAILY ACTIVITIY SCORE: 24
MOBILITY SCORE: 23
CLIMB 3 TO 5 STEPS WITH RAILING: A LITTLE

## 2024-07-15 ASSESSMENT — PAIN - FUNCTIONAL ASSESSMENT
PAIN_FUNCTIONAL_ASSESSMENT: 0-10
PAIN_FUNCTIONAL_ASSESSMENT: 0-10

## 2024-07-15 ASSESSMENT — PAIN SCALES - GENERAL
PAINLEVEL_OUTOF10: 6
PAINLEVEL_OUTOF10: 9
PAINLEVEL_OUTOF10: 8

## 2024-07-15 ASSESSMENT — ACTIVITIES OF DAILY LIVING (ADL): LACK_OF_TRANSPORTATION: NO

## 2024-07-15 NOTE — CONSULTS
"Reason For Consult  Left upper molar fracture.    History Of Present Illness  Joe Alaniz is a 58 y.o. male presenting with pain on tooth #16.     Past Medical History  He has no past medical history on file.    Surgical History  He has no past surgical history on file.     Social History  He reports that he has quit smoking. His smoking use included cigarettes. He has quit using smokeless tobacco. No history on file for alcohol use and drug use.    Family History  No family history on file.     Allergies  Erythromycin    Review of Systems  Please refer to provider notes     Physical Exam  EOE: Patient presented with lesion on the lips  IOE: Present decay on teeth #16 (fractured) and #30 and generalized staining. No swelling present.       Last Recorded Vitals  Blood pressure (!) 128/91, pulse 93, temperature 36.5 °C (97.7 °F), resp. rate 18, height 1.676 m (5' 6\"), weight (!) 44.5 kg (98 lb), SpO2 100%.    Relevant Results  Tooth #16 was deemed non-restorable and tooth #30 presented with alexandra-apical radiolucency and was recommended for extraction.     Assessment/Plan     Intra-oral evaluation was rendered. Endodontic testing (Endo ice (ice), palpation, percussion and mobility) was done on tooth #30. 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 Due to alexandra-apical radiolucency and extensive decay  present recommended treatment for tooth #30 was extraction. Due to extensive fracture of tooth #16 recommended patient to have tooth extracted.      I spent 30 minutes in the professional and overall care of this patient.      Suri Martinez DDS    "

## 2024-07-15 NOTE — PROGRESS NOTES
"Joe Alaniz is a 58 y.o. male admitted on 07/10 on day 5 of admission presenting with Failure to thrive in adult     Subjective   NAEO   Ate two meals yesterday without difficulties     Objective     Physical Exam  General: cachectic, NAD  HEENT: NCAT, EOMI, no icterus, palpable LN on R jaw, moist oral mucosa   Cardiac: normal rate regular rhythm   Pulm: CTAB, on RA, no increased WOB  GI: abd nontender to palpation, old, well-healed midline surgical incision    Extremities: No LE edema  Skin: no bruising or erythema of visible skin  Neuro: Aox3, CN II-XII grossly intact, moves extremities freely  Psych: can be tearful when discussing his clinical condition but otherwise pleasant    Last Recorded Vitals  Blood pressure (!) 136/95, pulse 81, temperature 36.7 °C (98.1 °F), resp. rate 18, height 1.676 m (5' 6\"), weight (!) 44.5 kg (98 lb), SpO2 100%.  Intake/Output last 3 Shifts:  I/O last 3 completed shifts:  In: 104.6 (2.4 mL/kg) [I.V.:104.6 (2.4 mL/kg)]  Out: - (0 mL/kg)   Weight: 44.5 kg     Relevant Results              Scheduled medications  amLODIPine, 10 mg, oral, Daily  calcium carbonate, 1,000 mg, oral, Daily  cyanocobalamin, 1,000 mcg, oral, Daily  enoxaparin, 40 mg, subcutaneous, Daily  ergocalciferol, 50,000 Units, oral, Every Sunday  ferrous sulfate (325 mg ferrous sulfate), 65 mg of iron, oral, Daily with breakfast  folic acid, 1 mg, oral, Daily  melatonin, 10 mg, oral, Nightly  multivitamin with minerals, 1 tablet, oral, Daily  pantoprazole, 40 mg, oral, BID AC  polyethylene glycol, 17 g, oral, Daily  sennosides-docusate sodium, 1 tablet, oral, Nightly  thiamine, 100 mg, intravenous, Daily      Continuous medications     PRN medications  PRN medications: acetaminophen, benzocaine, benzocaine-menthol, lidocaine-diphenhydraMINE-Maalox 1:1:1, ondansetron, oxyCODONE        Malnutrition Diagnosis Status: New  Malnutrition Diagnosis: Severe malnutrition related to chronic disease or condition  As Evidenced " by: pt with a decrease in PO intake for the last two months with at least a 27% weight loss (possibly more) with noted severe fat and muscle loss on physical exam  I agree with the dietitian's malnutrition diagnosis.      Assessment/Plan   Principal Problem:    Failure to thrive in adult  Active Problems:    HTN (hypertension)    Joe Alaniz is a 58 y.o. male with a PMHx of HTN, BPH, left inguinal hernia, and PUD w/perforated ulcer s/p surgical  many years ago who presented 07/10 after a syncopal episode in setting of nausea and vomiting. He was found to have multiple electrolyte abnormalities (hypokalemia, hypochloremia, hypomagnesemia) and failure to thrive  (~50 lb weight loss ).  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 Originally admitted to the step down unit and transferred to floor 07/13. He is currently being managed for refeeding syndrome while advancing his diet.    Updates 7/15:  - monitoring electrolytes   - music/art/spiritual therapy consult     #Failure to thrive  #Nausea/Emesis, resolved  #Gastric mass, pending biopsy results   -CT A/P with contrast 7/10: 2x1.5cm mass within posterior wall of gasttric fundus c/f underlying neoplasm. C/f esophagitis. Diverticulosis without acute diverticulitis. Left inguinal hernial without obstruction, decreased in size when compared to CT on 7/2   -EGD 7/12 showed patchy esophagitis, submucosal mass 18 mm x 16 mm in fundus (fine needle bx pending), atrophic and erythematous mucosa of fundus (cold forceps bx performed)  -CEA wnl  -Likely GIST, will need GI and surg onc follow up  Plan:  - Fu for final biopsy results  - Fu for cold forceps bx for H. Pylori   - CA 19-9 pending  - Cont pantoprazole 40 mg PO BID, calcium carbonate chewable     #Electrolyte derangements  #Refeeding syndrome  -hypokalemia, hypochloremia, hypomagnesemia  -K 2.1 on admission -> 3.5 upon transfer  -Mg wnl on admission -> 1.48 upon  transfer  -Phos wnl on admission -> 1.9 upon transfer  - Ca wnl on admission -> 7.1 upon transfer (8.2 corrected for albumin of 3.0)  Plan:  -Continue to aggressively replete K>4, Mg>2, Phos>3  -BID RFP/Mg  -Continue soft diet    #Malnutrition  Plan:  - c/w folic acid, thiamine  - c/w weekly vitamin D supplementation  - c/w B12 and MVI    #Poor dentition  Plan:  - will follow up OP with OMFS for tooth extraction  - c/w oral care    #HTN  Plan:  - c/w amlodipine 10 mg PO daily    #Iron deficiency anemia  - Fe 32, TIBC 196  Plan:  - Ferous sulfate 325 mg PO daily  - CTM    #BPH  - holding home tamsulosin, pt said not taking    F: replete PRN  E: aggressively replete K>4, Mg>2, Phos>3  N: soft diet, advance as tolerated   A: PIV     GI ppx: PPI  DVT: enoxaparin  subq  Pain regimen: tylenol 650 q4 PRN, oxy 5 mg PRN q6hr  Sleep: melatonin     Dispo: PT/OT pending  Code Status: Full code   NOK: Tooevelyne Alaniz, [phone]           Elicia Hodges MD  [email or web address]

## 2024-07-15 NOTE — PROGRESS NOTES
07/15/24 1428   Discharge Planning   Living Arrangements Alone   Support Systems Spouse/significant other   Assistance Needed none   Type of Residence Private residence   Who is requesting discharge planning? Provider   Home or Post Acute Services None   Expected Discharge Disposition Home   Does the patient need discharge transport arranged? No  (girlfriend to provide)   Financial Resource Strain   How hard is it for you to pay for the very basics like food, housing, medical care, and heating? Not hard   Housing Stability   In the last 12 months, was there a time when you were not able to pay the mortgage or rent on time? N   In the past 12 months, how many times have you moved where you were living? 1   At any time in the past 12 months, were you homeless or living in a shelter (including now)? N   Transportation Needs   In the past 12 months, has lack of transportation kept you from medical appointments or from getting medications? no   In the past 12 months, has lack of transportation kept you from meetings, work, or from getting things needed for daily living? No     Met with pt and introduced myself as care coordinator with Care Transitions Team for discharge planning. Pt lives at home alone, he reports being completely independent with ADL's/iADL's, including ambulation. Pt stated he has a 31 year old son who is in and out of his home. Pt stated he plans to move in with his girlfriend temporarily for a few months after discharge and she has a one level living arrangement. Pt stated she feels safe at home. Pt denies any falls. Pt's address, phone number, and contact information was verified. Pt denies any social or financial concerns at this time.    Home care: none.  DME: none.  : none.  PCP: Pt stated he does not have a PCP, he plans to arrange follow up appt with a new PCP via [facility] after discharge.  Pharm: [facility] - [place] - or - [place] (denies issues affording/obtaining  medications)    Discharge Planning: Per medical team pt admitted with FTT, team plan to monitor electrolytes and pt for refeeding syndrome. Pt aware PT is recommending low intensity therapy, he refused both home and outpatient PT stating he doesn't need it. Pt stated he can go to the gym if he feels he need to exercise after discharge. Pt is requesting refill of antihypertensive meds, and [facility], team aware. Pt voiced no additional questions or concerns regarding discharge planning. Care coordinator will continue to follow for discharge planning needs.    Candace Alva RN  Transitional Care Coordinator/TCC  [phone]

## 2024-07-16 ENCOUNTER — TELEPHONE (OUTPATIENT)
Dept: HEMATOLOGY/ONCOLOGY | Facility: HOSPITAL | Age: 59
End: 2024-07-16
Payer: COMMERCIAL

## 2024-07-16 ENCOUNTER — PHARMACY VISIT (OUTPATIENT)
Dept: PHARMACY | Facility: CLINIC | Age: 59
End: 2024-07-16
Payer: MEDICAID

## 2024-07-16 ENCOUNTER — APPOINTMENT (OUTPATIENT)
Dept: CARDIOLOGY | Facility: HOSPITAL | Age: 59
End: 2024-07-16
Payer: COMMERCIAL

## 2024-07-16 VITALS
OXYGEN SATURATION: 100 % | HEART RATE: 89 BPM | RESPIRATION RATE: 18 BRPM | TEMPERATURE: 98.6 F | BODY MASS INDEX: 15.75 KG/M2 | HEIGHT: 66 IN | WEIGHT: 98 LBS | SYSTOLIC BLOOD PRESSURE: 138 MMHG | DIASTOLIC BLOOD PRESSURE: 92 MMHG

## 2024-07-16 DIAGNOSIS — K31.89 GASTRIC MASS: Primary | ICD-10-CM

## 2024-07-16 LAB
ALBUMIN SERPL BCP-MCNC: 3.4 G/DL (ref 3.4–5)
ANION GAP SERPL CALC-SCNC: 16 MMOL/L (ref 10–20)
BASOPHILS # BLD AUTO: 0.03 X10*3/UL (ref 0–0.1)
BASOPHILS NFR BLD AUTO: 0.4 %
BUN SERPL-MCNC: 7 MG/DL (ref 6–23)
CALCIUM SERPL-MCNC: 8.2 MG/DL (ref 8.6–10.6)
CHLORIDE SERPL-SCNC: 103 MMOL/L (ref 98–107)
CO2 SERPL-SCNC: 23 MMOL/L (ref 21–32)
CREAT SERPL-MCNC: 0.88 MG/DL (ref 0.5–1.3)
EGFRCR SERPLBLD CKD-EPI 2021: >90 ML/MIN/1.73M*2
EOSINOPHIL # BLD AUTO: 0.07 X10*3/UL (ref 0–0.7)
EOSINOPHIL NFR BLD AUTO: 1 %
ERYTHROCYTE [DISTWIDTH] IN BLOOD BY AUTOMATED COUNT: 12.5 % (ref 11.5–14.5)
G6PD RBC QL: NORMAL
GLUCOSE SERPL-MCNC: 88 MG/DL (ref 74–99)
HCT VFR BLD AUTO: 33.5 % (ref 41–52)
HGB BLD-MCNC: 11.2 G/DL (ref 13.5–17.5)
IMM GRANULOCYTES # BLD AUTO: 0.03 X10*3/UL (ref 0–0.7)
IMM GRANULOCYTES NFR BLD AUTO: 0.4 % (ref 0–0.9)
LYMPHOCYTES # BLD AUTO: 1.41 X10*3/UL (ref 1.2–4.8)
LYMPHOCYTES NFR BLD AUTO: 20.5 %
MAGNESIUM SERPL-MCNC: 1.7 MG/DL (ref 1.6–2.4)
MCH RBC QN AUTO: 32.7 PG (ref 26–34)
MCHC RBC AUTO-ENTMCNC: 33.4 G/DL (ref 32–36)
MCV RBC AUTO: 98 FL (ref 80–100)
MONOCYTES # BLD AUTO: 0.97 X10*3/UL (ref 0.1–1)
MONOCYTES NFR BLD AUTO: 14.1 %
NEUTROPHILS # BLD AUTO: 4.38 X10*3/UL (ref 1.2–7.7)
NEUTROPHILS NFR BLD AUTO: 63.6 %
NRBC BLD-RTO: 0 /100 WBCS (ref 0–0)
PHOSPHATE SERPL-MCNC: 2.8 MG/DL (ref 2.5–4.9)
PLATELET # BLD AUTO: 504 X10*3/UL (ref 150–450)
POTASSIUM SERPL-SCNC: 3.8 MMOL/L (ref 3.5–5.3)
RBC # BLD AUTO: 3.42 X10*6/UL (ref 4.5–5.9)
SODIUM SERPL-SCNC: 138 MMOL/L (ref 136–145)
WBC # BLD AUTO: 6.9 X10*3/UL (ref 4.4–11.3)

## 2024-07-16 PROCEDURE — 93005 ELECTROCARDIOGRAM TRACING: CPT

## 2024-07-16 PROCEDURE — 85025 COMPLETE CBC W/AUTO DIFF WBC: CPT

## 2024-07-16 PROCEDURE — 2500000004 HC RX 250 GENERAL PHARMACY W/ HCPCS (ALT 636 FOR OP/ED)

## 2024-07-16 PROCEDURE — 83735 ASSAY OF MAGNESIUM: CPT

## 2024-07-16 PROCEDURE — 99239 HOSP IP/OBS DSCHRG MGMT >30: CPT

## 2024-07-16 PROCEDURE — 36415 COLL VENOUS BLD VENIPUNCTURE: CPT

## 2024-07-16 PROCEDURE — 93010 ELECTROCARDIOGRAM REPORT: CPT | Performed by: INTERNAL MEDICINE

## 2024-07-16 PROCEDURE — 2500000001 HC RX 250 WO HCPCS SELF ADMINISTERED DRUGS (ALT 637 FOR MEDICARE OP)

## 2024-07-16 PROCEDURE — 84100 ASSAY OF PHOSPHORUS: CPT

## 2024-07-16 PROCEDURE — RXMED WILLOW AMBULATORY MEDICATION CHARGE

## 2024-07-16 RX ORDER — FOLIC ACID 1 MG/1
1 TABLET ORAL DAILY
Qty: 30 TABLET | Refills: 0 | Status: SHIPPED | OUTPATIENT
Start: 2024-07-17

## 2024-07-16 RX ORDER — CALCIUM CARBONATE 200(500)MG
2 TABLET,CHEWABLE ORAL DAILY
Qty: 60 TABLET | Refills: 0 | Status: CANCELLED | OUTPATIENT
Start: 2024-07-17

## 2024-07-16 RX ORDER — HYDROXYZINE HYDROCHLORIDE 10 MG/1
10 TABLET, FILM COATED ORAL EVERY 6 HOURS PRN
Status: DISCONTINUED | OUTPATIENT
Start: 2024-07-16 | End: 2024-07-16 | Stop reason: HOSPADM

## 2024-07-16 RX ORDER — LANOLIN ALCOHOL/MO/W.PET/CERES
1000 CREAM (GRAM) TOPICAL DAILY
Qty: 30 TABLET | Refills: 0 | Status: SHIPPED | OUTPATIENT
Start: 2024-07-17

## 2024-07-16 RX ORDER — MULTIVIT-MIN/IRON FUM/FOLIC AC 7.5 MG-4
1 TABLET ORAL DAILY
Qty: 30 TABLET | Refills: 0 | Status: SHIPPED | OUTPATIENT
Start: 2024-07-17

## 2024-07-16 RX ORDER — HYDROCHLOROTHIAZIDE 25 MG/1
12.5 TABLET ORAL DAILY
Qty: 15 TABLET | Refills: 0 | Status: SHIPPED | OUTPATIENT
Start: 2024-07-16 | End: 2024-08-15

## 2024-07-16 RX ORDER — AMLODIPINE BESYLATE 10 MG/1
10 TABLET ORAL DAILY
Qty: 30 TABLET | Refills: 0 | Status: SHIPPED | OUTPATIENT
Start: 2024-07-16 | End: 2024-08-15

## 2024-07-16 RX ORDER — MAGNESIUM SULFATE HEPTAHYDRATE 40 MG/ML
4 INJECTION, SOLUTION INTRAVENOUS ONCE
Status: COMPLETED | OUTPATIENT
Start: 2024-07-16 | End: 2024-07-16

## 2024-07-16 RX ORDER — ERGOCALCIFEROL 1.25 MG/1
50000 CAPSULE ORAL
Qty: 8 CAPSULE | Refills: 0 | Status: SHIPPED | OUTPATIENT
Start: 2024-07-21

## 2024-07-16 RX ORDER — ACETAMINOPHEN 500 MG
1000 TABLET ORAL EVERY 6 HOURS PRN
Qty: 30 TABLET | Refills: 0 | Status: SHIPPED | OUTPATIENT
Start: 2024-07-16

## 2024-07-16 RX ORDER — PANTOPRAZOLE SODIUM 40 MG/1
40 TABLET, DELAYED RELEASE ORAL
Qty: 60 TABLET | Refills: 0 | Status: SHIPPED | OUTPATIENT
Start: 2024-07-16

## 2024-07-16 RX ORDER — FERROUS SULFATE 325(65) MG
325 TABLET ORAL
Qty: 30 TABLET | Refills: 0 | Status: SHIPPED | OUTPATIENT
Start: 2024-07-17

## 2024-07-16 ASSESSMENT — COGNITIVE AND FUNCTIONAL STATUS - GENERAL
DAILY ACTIVITIY SCORE: 24
CLIMB 3 TO 5 STEPS WITH RAILING: A LITTLE
MOBILITY SCORE: 23

## 2024-07-16 ASSESSMENT — PAIN SCALES - GENERAL: PAINLEVEL_OUTOF10: 0 - NO PAIN

## 2024-07-16 NOTE — DISCHARGE INSTRUCTIONS
Dear Mr. Alaniz,    You were admitted for nausea and vomiting that caused you have low levels of electrolytes in your blood. While in the hospital, a CT scan showed a mass in your stomach that was biopsied. Results for this biopsy are not available yet. Your electrolyte levels were monitored and replaced when they were low. Once they stabilized without needing further replacement, it was safe to discharge you home. You will need to follow up outpatient with your PCP within one week for this hospitalization. You also need to see general surgery for your hernia. You were referred to outpatient oromaxillary facial surgery for removal of your tooth. You were also referred to the [facility] to follow up on your biopsy results. If you do not hear back about scheduling these appointments, please call [phone] ([phone]).    Please continue taking your medications as prescribed. This includes your blood pressure medications (amlodipine and hydrochlorothiazide). You were started on an acid reflux medicine (pantoprazole) for heartburn. You were also started on several vitamins that you should keep taking (Vitamin D, Vitamin B12, iron (ferrous sulfate), Folic acid, and a multivitamin).    Continue to eat a soft diet until your tooth is fixed. Try to supplement your nutrition with ensures and aim for three meals a day. If you have nausea/vomiting, extreme fatigue, heart palpitations, or worsening abdominal pain, please seek medical help either with your PCP or returning to the ED.

## 2024-07-16 NOTE — TELEPHONE ENCOUNTER
Referral placed to [facility] by Marlena Bacon DO of [facility] for gastric mass, with gastric biopsy results pending from EGD 7/12/24. I called to discuss the referral and schedule an appt (virtual or in-person) to review the path results and advise next steps. No answer; left VM requesting a return call to office.   SMITHA Rogers.[facility]

## 2024-07-16 NOTE — CARE PLAN
The patient's goals for the shift include      The clinical goals for the shift include pt will report adequate pain relief during shift    Pt did report an increase in mouth/ tooth pain during shift

## 2024-07-16 NOTE — NURSING NOTE
Discharge Note:    Pt A&O x 4 at time of discharge. Reviewed discharge instructions, follow up appointments, and medication changes. Pt verbalized understanding and has no further questions. IV and telemetry removed by bedside RN. Pt in possession of all belongings. Pt to  medications at [facility] at time of discharge. Pt to be transported  home via family member. Pt resting comfortably in bed awaiting family arrival.

## 2024-07-16 NOTE — DISCHARGE SUMMARY
Discharge Diagnosis  Failure to thrive in adult  Gastric Mass pending biopsy      Issues Requiring Follow-Up  Fu with OMFS to remove tooth (fracture vs decay)  Fu with general surgery for hernia  Fu with [facility] for gastric biopsy results  Fu with PCP following this hospitalization      Test Results Pending At Discharge  Pending Labs       Order Current Status    CBC and Auto Differential In process    Surgical Pathology Exam In process            Hospital Course  Joe Alaniz is a 58 y.o. male with a PMHx of HTN, BPH, left inguinal hernia, alcohol use, and PUD w/perforated ulcer s/p surgical intervention many years ago who presented 07/10 after a syncopal episode in setting of nausea and vomiting. He was found to have multiple electrolyte abnormalities (hypokalemia, hypochloremia, hypomagnesemia) and failure to thrive (~50 lb weight loss ). He was also having difficulties with eating due to dental caries. 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 Originally admitted to the step down unit where his electrolytes were monitored and aggressively replaced as needed. He was initially placed on O2 NC for hypoxia and weaned to room air following clinical improvement. Labs were remarkable for an LEIF that resolved over time and an elevated lactate. There was no clinical concern of infection and the lactate normalized during the hospitalization. Given hx of alcohol use, he was started on thiamine and folic acid. Given multiple electrolyte abnormalities in the setting of poor oral intake, he was slowly started on a soft diet with advancement as tolerated while being monitored for refeeding syndrome. He was transferred to the general medicine floor 07/13 for further management. On the floor, electrolytes were monitored and replaced as needed. Once his serum electrolytes were stabilized and PO intake was consistent, patient was discharged home. He is to follow  up with OMFS to remove tooth (fracture vs decay), general surgery for hernia evaluation, [facility] for gastric biopsy results, and with his PCP following this hospitalization.     Medications On discharge:  - continue amlodipine and hydrochlorothiazide for BP  - continue pantoprazole 40 mg BID for concern of esophagitis/GERD  - continue with vitamin supplementation:   cyanocobalamin, 1,000 mcg PO Daily.   ergocalciferol, 50,000 Units, oral, weekly.  ferrous sulfate (325 mg ferrous sulfate), 65 mg of iron, oral, Daily with breakfast  folic acid, 1 mg, oral, Daily  multivitamin with minerals, 1 tablet, oral, Daily    Appointments on discharge:   Jul 19, 2024 2:30 PM EDT General Surgery New Patient Visit with Varun Rangel MD [facility] ([facility])   - referred to OMFS to remove tooth (fracture vs decay)  - referred to [facility] for gastric biopsy results  - fu with PCP following this hospitalization.    Pertinent Physical Exam At Time of Discharge  Physical Exam  General: cachectic, NAD, seated on edge of bed, intermittently tearful   HEENT: NCAT, EOMI, no icterus, palpable LN on R jaw, moist oral mucosa   Cardiac: tachycardia, regular rhythm   Pulm: CTAB, on RA, no increased WOB  GI: abd nontender to palpation, old, well-healed midline surgical incision    Extremities: No LE edema  Skin: no bruising or erythema of visible skin  Neuro: Aox3, CN II-XII grossly intact, moves extremities freely  Psych: ruminating on his initial stay in SDU in [facility] Medications     Medication List      START taking these medications     cyanocobalamin 1,000 mcg tablet; Commonly known as: Vitamin B-12; Take 1   tablet (1,000 mcg) by mouth once daily.; Start taking on: July 17, 2024   ergocalciferol 1.25 MG (93403 UT) capsule; Commonly known as: Vitamin   D-2; Take 1 capsule (50,000 Units) by mouth 1 (one) time per week.; Start   taking on: July 21, 2024   ferrous sulfate  (325 mg ferrous sulfate) tablet; Take 1 tablet by mouth   once daily with breakfast.; Start taking on: July 17, 2024   folic acid 1 mg tablet; Commonly known as: Folvite; Take 1 tablet (1 mg)   by mouth once daily.; Start taking on: July 17, 2024   multivitamin with minerals tablet; Take 1 tablet by mouth once daily.;   Start taking on: July 17, 2024   pantoprazole 40 mg EC tablet; Commonly known as: ProtoNix; Take 1 tablet   (40 mg) by mouth 2 times a day before meals. Do not crush, chew, or split.     CONTINUE taking these medications     acetaminophen 500 mg tablet; Commonly known as: Tylenol; Take 2 tablets   (1,000 mg) by mouth every 6 hours if needed for mild pain (1 - 3).   amLODIPine 10 mg tablet; Commonly known as: Norvasc; Take 1 tablet (10   mg) by mouth once daily.   diphenhydramine/Maalox/lidocaine (Magic Mouthwash) - Compounded -   Outpatient; Swish and spit every 4-6 hours as needed for mouth sores.   hydroCHLOROthiazide 25 mg tablet; Commonly known as: HYDRODiuril; Take   0.5 tablets (12.5 mg) by mouth once daily.   psyllium 3.4 gram packet; Commonly known as: Metamucil; Take 1 packet by   mouth 2 times a day. Mix and drink with at least 8 ounces of water or   juice.     STOP taking these medications     aspirin 81 mg chewable tablet   ondansetron 4 mg tablet; Commonly known as: Zofran   traMADol 50 mg tablet; Commonly known as: Ultram       Outpatient Follow-Up  Future Appointments   Date Time Provider Department Center   7/19/2024  2:30 PM Varun Rangel MD [id number] [facility]       Elicia Hodges MD  [id number]

## 2024-07-16 NOTE — RESEARCH NOTES
Artificial Intelligence Monitoring in Nursing (AIMS Nursing) Study    Principle Investigator - Dr. Genaro Dickens  Research Coordinator - Flor Marinelli RN     Patient Name - Joe Alaniz  Date - 7/16/2024 12:30 PM  Location - [place]    Joe Alaniz was approached by Flor Marinelli RN to talk about participating in the AIMS Nursing Study. The patient was approached, they are expecting to be discharged today. Study protocol was followed and patient was given study contact information.     Flor Marinelli RN

## 2024-07-17 LAB
ATRIAL RATE: 100 BPM
P AXIS: 87 DEGREES
P OFFSET: 201 MS
P ONSET: 151 MS
PR INTERVAL: 146 MS
Q ONSET: 224 MS
QRS COUNT: 16 BEATS
QRS DURATION: 84 MS
QT INTERVAL: 352 MS
QTC CALCULATION(BAZETT): 454 MS
QTC FREDERICIA: 417 MS
R AXIS: -35 DEGREES
T AXIS: 81 DEGREES
T OFFSET: 400 MS
VENTRICULAR RATE: 100 BPM

## 2024-07-17 NOTE — TELEPHONE ENCOUNTER
11:21 AM Called Mr. Alaniz, notified of below message. He is agreeable to appt in the diagnostic clinic on Friday 7/26 at 1100, to discuss path results and next steps w/ medical oncology and surg oncology teams.  Appt scheduled.  SMITHA Rogers.[facility]

## 2024-07-19 ENCOUNTER — APPOINTMENT (OUTPATIENT)
Dept: SURGERY | Facility: CLINIC | Age: 59
End: 2024-07-19
Payer: COMMERCIAL

## 2024-07-19 VITALS
HEIGHT: 66 IN | WEIGHT: 98 LBS | OXYGEN SATURATION: 99 % | SYSTOLIC BLOOD PRESSURE: 130 MMHG | TEMPERATURE: 96.8 F | BODY MASS INDEX: 15.75 KG/M2 | DIASTOLIC BLOOD PRESSURE: 96 MMHG | HEART RATE: 113 BPM

## 2024-07-19 DIAGNOSIS — K40.90 LEFT INGUINAL HERNIA: Primary | ICD-10-CM

## 2024-07-19 PROCEDURE — 3008F BODY MASS INDEX DOCD: CPT | Performed by: STUDENT IN AN ORGANIZED HEALTH CARE EDUCATION/TRAINING PROGRAM

## 2024-07-19 PROCEDURE — 3080F DIAST BP >= 90 MM HG: CPT | Performed by: STUDENT IN AN ORGANIZED HEALTH CARE EDUCATION/TRAINING PROGRAM

## 2024-07-19 PROCEDURE — 1036F TOBACCO NON-USER: CPT | Performed by: STUDENT IN AN ORGANIZED HEALTH CARE EDUCATION/TRAINING PROGRAM

## 2024-07-19 PROCEDURE — 3075F SYST BP GE 130 - 139MM HG: CPT | Performed by: STUDENT IN AN ORGANIZED HEALTH CARE EDUCATION/TRAINING PROGRAM

## 2024-07-19 PROCEDURE — 99205 OFFICE O/P NEW HI 60 MIN: CPT | Performed by: STUDENT IN AN ORGANIZED HEALTH CARE EDUCATION/TRAINING PROGRAM

## 2024-07-19 ASSESSMENT — ENCOUNTER SYMPTOMS
DEPRESSION: 0
LOSS OF SENSATION IN FEET: 0
OCCASIONAL FEELINGS OF UNSTEADINESS: 0

## 2024-07-19 ASSESSMENT — PATIENT HEALTH QUESTIONNAIRE - PHQ9
2. FEELING DOWN, DEPRESSED OR HOPELESS: NOT AT ALL
SUM OF ALL RESPONSES TO PHQ9 QUESTIONS 1 & 2: 0
1. LITTLE INTEREST OR PLEASURE IN DOING THINGS: NOT AT ALL

## 2024-07-19 ASSESSMENT — PAIN SCALES - GENERAL: PAINLEVEL: 10-WORST PAIN EVER

## 2024-07-19 NOTE — H&P (VIEW-ONLY)
History Of Present Illness  Patient is a 58-year-old male who presents with a known left inguinal hernia. He's known about it for approximately one year. This does cost him intermittent pain throughout the day and discomfort related to the hernia and its mass effect. He denies any episodes of nausea, vomiting. If the pain becomes significant, he lies down and self reduces the hernia which resolves his symptoms. he denies any episodes of strangulation or incarceration. Of note, he does have a past surgical history, including an exploratory laparotomy for perforated peptic ulcer disease in the 1980s.      Past Medical History  No past medical history on file.    Surgical History  No past surgical history on file.     Social History  He reports that he has quit smoking. His smoking use included cigarettes. He has quit using smokeless tobacco. He reports that he does not use drugs. No history on file for alcohol use.    Family History  No family history on file.     Allergies  Erythromycin    Review of Systems  - CONSTITUTIONAL: Denies fever and chills.  - HEENT: Denies changes in vision and hearing.  - RESPIRATORY: Denies SOB and cough.  - CV: Denies palpitations and CP.  - GI: Denies abdominal pain, nausea, vomiting and diarrhea.  - : Denies dysuria and urinary frequency.  - MSK: Denies myalgia and joint pain.  - SKIN: Denies rash and pruritus.  - NEUROLOGICAL: Denies headache and syncope.  - PSYCHIATRIC: Denies recent changes in mood. Denies anxiety and depression.     Physical Exam  - GENERAL: Alert and oriented x 3. No acute distress. Well-nourished.  - EYES: EOMI. Anicteric.  - HENT: Moist mucous membranes. No scleral icterus. No cervical lymphadenopathy.  - LUNGS: Breathing comfortably on room air. No accessory muscle use, no distress.  - CARDIOVASCULAR: Regular rate and rhythm. No murmur. No JVD.  - ABDOMEN: Soft, non-tender and non-distended. Fairly obvious chronically incarcerated left scrotal inguinal  "hernia   - EXTREMITIES: No edema. Non-tender.  - SKIN: No rashes or lesions. Warm.  - NEUROLOGIC: No focal neurological deficits. CN II-XII grossly intact, but not individually tested.  - PSYCHIATRIC: Cooperative. Appropriate mood and affect.    Last Recorded Vitals  Blood pressure (!) 130/96, pulse (!) 113, temperature 36 °C (96.8 °F), temperature source Temporal, height 1.676 m (5' 6\"), weight (!) 44.5 kg (98 lb), SpO2 99%.    Relevant Results  ECG 12 lead    Result Date: 7/17/2024  Normal sinus rhythm Possible Left atrial enlargement Left axis deviation Low voltage QRS Cannot rule out Anterior infarct , age undetermined Abnormal ECG When compared with ECG of 12-JUL-2024 10:25, T wave inversion no longer evident in Inferior leads    ECG 12 lead    Result Date: 7/15/2024  Sinus rhythm Left axis deviation Prolonged QT Abnormal ECG When compared with ECG of 11-JUL-2024 17:46, (unconfirmed) No significant change since Confirmed by Jorge Hopson ([id number]) on 7/15/2024 11:56:40 AM    ECG 12 Lead    Result Date: 7/15/2024  Sinus rhythm Left axis deviation Abnormal ECG When compared with ECG of 10-JUL-2024 20:19, ST elevation now present in Anterior leads QT has shortened Confirmed by Jorge Hopson ([id number]) on 7/15/2024 10:10:07 AM    Electrocardiogram, 12-lead PRN ACS symptoms    Result Date: 7/14/2024  Normal sinus rhythm Left axis deviation Abnormal ECG When compared with ECG of 12-JUL-2024 10:25, (unconfirmed) No significant change was found Confirmed by Jorge Hopson ([id number]) on 7/14/2024 9:24:17 PM    Endoscopic Ultrasound (Upper) w Biopsy    Result Date: 7/12/2024  Table formatting from the original result was not included. Findings Patchy grade D esophagitis with multiple mucosal breaks measuring 5 mm or more, continuous between folds, covering 75% or more of the circumference in the lower third of the esophagus; performed cold forceps biopsy Homogeneous, hypoechoic, round and submucosal mass measuring 18 mm x 16 mm was " visualized in the fundus of the stomach, contained within the muscularis propria; 1 successful fine needle biopsy pass was taken with a 22 gauge Covidien Sharkcore needle using a transgastric approach guided by Doppler. A sample was sent for histology analysis. 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 Severe, generalized atrophic and erythematous mucosa in the fundus of the stomach, body of the stomach, antrum and prepyloric region, consistent with gastritis; performed cold forceps biopsy to rule out H. pylori The pylorus appeared normal. The duodenal bulb, 1st part of the duodenum and 2nd part of the duodenum appeared normal. Recommendation Await pathology results Follow up with primary care physician (patient currently does not have one). Follow up with Cecelia Sadler MD - Inpatient Medicine Service. Follow up with Kerwin Cardozo MD and Magaly Franco MD - Inpatient GI Consult Service. Indication Odynophagia, Abnormal CT of the abdomen, Gastric mass, Esophageal thickening, Nausea and vomiting, unspecified vomiting type Staff Staff Role Kip Ramos MD Proceduralist Medications See Anesthesia Record. Preprocedure A history and physical has been performed, and patient medication allergies have been reviewed. The patient's tolerance of previous anesthesia has been reviewed. The risks and benefits of the procedure and the sedation options and risks were discussed with the patient. All questions were answered and informed consent obtained. Details of the Procedure The patient underwent monitored anesthesia care, which was administered by an anesthesia professional. The patient's blood pressure, heart rate, level of consciousness, oxygen, respirations, ECG and ETCO2 were monitored throughout the procedure. The endoscope, linear scope and radial scope were introduced through the mouth and advanced to the second part of the duodenum. Retroflexion was performed in the fundus. The patient's estimated blood loss  was minimal (<5 mL). The procedure was not difficult. The patient tolerated the procedure well. There were no apparent adverse events. Events Procedure Events Event Event Time ENDO SCOPE IN TIME 7/12/2024  2:06 PM ENDO SCOPE OUT TIME 7/12/2024  2:19 PM ENDO SCOPE IN TIME 7/12/2024  2:24 PM ENDO SCOPE OUT TIME 7/12/2024  2:46 PM Specimens ID Type Source Tests Collected by Time 1 : antrum, body, incisura Tissue STOMACH BODY/CORPUS BIOPSY SURGICAL PATHOLOGY EXAM Kip Ramos MD 7/12/2024 1414 2 :  Tissue ESOPHAGUS DISTAL BIOPSY SURGICAL PATHOLOGY EXAM Kip Ramos MD 7/12/2024 1416 3 : GIST FNB Tissue STOMACH FUNDUS BIOPSY SURGICAL PATHOLOGY EXAM Kip Ramos MD 7/12/2024 1446 Procedure Location [place] [id number] Surgical Oncology [address] [place], [place] [address] [phone] Referring Provider Magaly Franco MD Procedure Provider Kip Ramos MD     XR panorex    Result Date: 7/12/2024  Interpreted By:  Ijeoma Herrera, STUDY: XR PANOREX   INDICATION: Signs/Symptoms:L upper molar fracture vs decay causing signifcant pain   COMPARISON: None.   ACCESSION NUMBER(S): [id number]   ORDERING CLINICIAN: GERBER ARMENTA   FINDINGS: Single panoramic radiograph of the mandible was obtained. Periapical lucencies are noted involving the right mandibular 2nd premolar and right mandibular 1st molar consistent with periapical abscess. No discrete abnormality is appreciated involving the left maxillary teeth. Paranasal sinuses are clear. There is no evidence of mandibular dislocation or fracture.       1. Periapical lucencies are noted involving the right mandibular 2nd premolar and right mandibular 1st molar consistent with periapical abscess.   Signed by: Ijeoma Herrera 7/12/2024 10:17 AM Dictation workstation:   [id number]    Transthoracic Echo (TTE) Complete    Result Date: 7/11/2024   [facility], [address] [place] [address]                 Tel [phone] and Fax [phone] TRANSTHORACIC ECHOCARDIOGRAM REPORT  Patient Name:      REGINE FARRIS        Reading Physician:    [id number] Killian Martinez MD Study Date:        7/11/2024            Ordering Provider:    [id number] GERBER ARMENTA MRN/PID:           [id number]             Fellow: Accession#:        [id number]         Nurse: Date of Birth/Age: 1965 / 58 years Sonographer:          NAOOP Chavez RDCS Gender:            M                    Additional Staff: Height:            167.64 cm            Admit Date: Weight:            44.45 kg             Admission Status:     Outpatient BSA / BMI:         1.48 m2 / 15.82      Encounter#:           [id number]                    kg/m2 Blood Pressure:    141/92 mmHg          Department Location:  [facility] Echo Lab Study Type:    TRANSTHORACIC ECHO (TTE) COMPLETE Diagnosis/ICD: Syncope-R55 Indication:    Syncope CPT Code:      Echo Complete w Full [id number] Patient History: Pertinent History: HTN and Syncope. Study Detail: The following Echo studies were performed: 2D, M-Mode, Doppler and               color flow.  PHYSICIAN INTERPRETATION: Left Ventricle: Left ventricular ejection fraction is normal, calculated by Aranda's biplane at 61%. Estimated left ventricular mass is normal. There are no regional wall motion abnormalities. The left ventricular cavity size is normal. There is normal left ventricular posterior wall thickness. Spectral Doppler shows a normal pattern of left ventricular diastolic filling. Left Atrium: The left atrium is normal in size. Right Ventricle: The right ventricle is normal in size. There is normal right ventricular global systolic function. Right Atrium: The right atrium is normal in size. The right atrium has a prominent  Eustachean valve. Aortic Valve: The aortic valve is probably trileaflet. There is no aortic valve cusp calcification noted. There is no aortic valve thickening. The aortic valve dimensionless index is 0.81. There is mild aortic valve regurgitation. The peak instantaneous gradient of the aortic valve is 5.1 mmHg. The mean gradient of the aortic valve is 2.6 mmHg.  ms. Mitral Valve: The mitral valve is normal in structure. There is no evidence of mitral valve regurgitation. Tricuspid Valve: The tricuspid valve is structurally normal. There is mild tricuspid regurgitation. The Doppler estimated RVSP is within normal limits at 25.5 mmHg. Pulmonic Valve: The pulmonic valve is structurally normal. There is mild pulmonic valve regurgitation. Pericardium: There is no pericardial effusion noted. Aorta: The aortic root is normal. Systemic Veins: The inferior vena cava size appears small. There is IVC inspiratory collapse greater than 50%. In comparison to the previous echocardiogram(s): There are no prior studies on this patient for comparison purposes.  CONCLUSIONS:  1. Left ventricular ejection fraction is normal, calculated by Aranda's biplane at 61%.  2. There is normal right ventricular global systolic function.  3. Mild aortic valve regurgitation.  4. RVSP within normal limits.  5. The inferior vena cava size appears small. QUANTITATIVE DATA SUMMARY: 2D MEASUREMENTS:                          Normal Ranges: Ao Root d:     3.40 cm   (2.0-3.7cm) LAs:           2.22 cm   (2.7-4.0cm) RVIDd:         2.75 cm   (0.9-3.6cm) IVSd:          0.84 cm   (0.6-1.1cm) LVPWd:         0.57 cm   (0.6-1.1cm) LVIDd:         3.30 cm   (3.9-5.9cm) LVIDs:         1.72 cm LV Mass Index: 46.6 g/m2 LV % FS        47.9 % LA VOLUME:                               Normal Ranges: LA Vol A4C:        30.4 ml    (22+/-6mL/m2) LA Vol A2C:        14.1 ml LA Vol BP:         23.9 ml LA Vol Index A4C:  20.6ml/m2 LA Vol Index A2C:  9.5 ml/m2 LA Vol  Index BP:   16.2 ml/m2 LA Area A4C:       10.7 cm2 LA Area A2C:       6.3 cm2 LA Major Axis A4C: 3.2 cm LA Major Axis A2C: 2.4 cm LA Volume Index:   16.0 ml/m2 M-MODE MEASUREMENTS:                  Normal Ranges: Ao Root: 2.80 cm (2.0-3.7cm) LAs:     3.18 cm (2.7-4.0cm) AORTA MEASUREMENTS:                      Normal Ranges: Ao Sinus, d: 3.68 cm (2.1-3.5cm) Asc Ao, d:   3.50 cm (2.1-3.4cm) LV SYSTOLIC FUNCTION BY 2D PLANIMETRY (MOD):                      Normal Ranges: EF-A4C View:    60 % (>=55%) EF-A2C View:    61 % EF-Biplane:     61 % LV EF Reported: 61 % LV DIASTOLIC FUNCTION:                               Normal Ranges: MV Peak E:        0.32 m/s    (0.7-1.2 m/s) MV Peak A:        0.68 m/s    (0.42-0.7 m/s) E/A Ratio:        0.47        (1.0-2.2) MV e'             0.095 m/s   (>8.0) MV lateral e'     0.13 m/s MV medial e'      0.06 m/s MV A Dur:         117.65 msec E/e' Ratio:       3.39        (<8.0) a'                0.08 m/s PulmV Sys Jesus:    42.38 cm/s PulmV Pulido Jesus:   21.62 cm/s PulmV S/D Jesus:    1.96 PulmV A Revs Jesus: 31.49 cm/s PulmV A Revs Dur: 76.12 msec MITRAL VALVE:                 Normal Ranges: MV DT: 106 msec (150-240msec) AORTIC VALVE:                                   Normal Ranges: AoV Vmax:                1.13 m/s (<=1.7m/s) AoV Peak P.1 mmHg (<20mmHg) AoV Mean P.6 mmHg (1.7-11.5mmHg) LVOT Max Jesus:            0.89 m/s (<=1.1m/s) AoV VTI:                 18.45 cm (18-25cm) LVOT VTI:                14.89 cm LVOT Diameter:           1.95 cm  (1.8-2.4cm) AoV Area, VTI:           2.41 cm2 (2.5-5.5cm2) AoV Area,Vmax:           2.37 cm2 (2.5-4.5cm2) AoV Dimensionless Index: 0.81 AORTIC INSUFFICIENCY: AI Vmax:       4.60 m/s AI Half-time:  798 msec AI Decel Time: 2751 msec AI Decel Rate: 167.82 cm/s2  RIGHT VENTRICLE: RV Basal 3.40 cm RV Mid   2.80 cm RV Major 6.3 cm TAPSE:   21.0 mm RV s'    0.12 m/s TRICUSPID VALVE/RVSP:                             Normal Ranges:  Peak TR Velocity: 2.37 m/s Est. RA Pressure: 3 mmHg RV Syst Pressure: 25.5 mmHg (< 30mmHg) IVC Diam:         0.07 cm PULMONIC VALVE:                         Normal Ranges: PV Accel Time: 95 msec  (>120ms) PV Max Jesus:    0.7 m/s  (0.6-0.9m/s) PV Max P.9 mmHg Pulmonary Veins: PulmV A Revs Dur: 76.12 msec PulmV A Revs Jesus: 31.49 cm/s PulmV Pulido Jesus:   21.62 cm/s PulmV S/D Jesus:    1.96 PulmV Sys Jesus:    42.38 cm/s AORTA: Asc Ao Diam 3.53 cm  [id number] Killian Martinez MD Electronically signed on 2024 at 5:22:48 PM  ** Final **     ECG 12 lead    Result Date: 2024  Sinus tachycardia Left axis deviation Inferior infarct , age undetermined Prolonged QT Abnormal ECG When compared with ECG of 2024 05:35, Significant changes have occurred See ED provider note for full interpretation and clinical correlation Confirmed by Sonja Nair ([id number]) on 2024 5:55:45 AM    ECG 12 lead    Result Date: 2024  Normal sinus rhythm Left axis deviation Prolonged QT Abnormal ECG See ED provider note for full interpretation and clinical correlation Confirmed by Sonja Nair ([id number]) on 2024 5:35:16 AM    CT abdomen pelvis w IV contrast    Result Date: 7/10/2024  Interpreted By:  Dilshad Alvarado and Stevens Alex STUDY: CT ABDOMEN PELVIS W IV CONTRAST;  7/10/2024 2:54 pm   INDICATION: Signs/Symptoms:abd pain, hx of perforated ulcer. n/v/d. Per EMR, 58-year-old male with past medical history of hypertension BPH as well as remote history of perforated gastric ulcer presents to the ED with nausea vomiting diarrhea diffuse abdominal pain as well as generalized malaise. States he has been having these symptoms are proximally 2 weeks. Endorses having significant weight loss of approximately 45 pounds for the past 2-3 weeks. He states that the no small amount of bright red blood in emesis.   COMPARISON: CT abdomen and pelvis 2024.   ACCESSION NUMBER(S): [id number]   ORDERING CLINICIAN: MARCIA FALCON    TECHNIQUE: CT of the abdomen and pelvis was performed.  Standard contiguous axial images were obtained at 3 mm slice thickness through the abdomen and pelvis. Coronal and sagittal reconstructions at 3 mm slice thickness were performed.   80 ml of contrast Omnipaque 350 were administered intravenously without immediate complication.   FINDINGS: LOWER CHEST: Mild central lobar emphysema. The heart is normal in size without pericardial effusion. No pleural effusion is present. Redemonstration of circumferential mural thickening of the distal esophagus with submucosal edema.   ABDOMEN:   LIVER: Liver is diffusely enlarged with fatty infiltration. No focal lesions.   BILE DUCTS: The intrahepatic and extrahepatic ducts are not dilated.   GALLBLADDER: No calcified stones. No wall thickening.   PANCREAS: The pancreas appears unremarkable.   SPLEEN: Within normal limits.   ADRENAL GLANDS: Bilateral adrenal glands appear normal.   KIDNEYS AND URETERS: Re-demonstration of small hypodense bilateral cortical cysts. Kidneys are normal in size enhance symmetrically. No hydroureteronephrosis or nephroureterolithiasis is identified.   PELVIS:   BLADDER: The urinary bladder is decompressed, limited for evaluation.   REPRODUCTIVE ORGANS: The prostate is not enlarged.   BOWEL: Re-demonstration of gastric, small and large bowel mucosal hyperemia. There is in exophytic mass within the fundus of the stomach measuring approximately 2 cm x 1.5 cm as evidenced on series 601, image 38. Increased enhancement along the wall of the lower rectum and anal, which is nonspecific but may represent hemorrhoids. Otherwise, no evidence of bowel wall thickening dilation. Interval decrease in size of left inguinal hernia containing decompressed small bowel loops. Sigmoid diverticulosis without evidence of acute diverticulitis. Appendix is not visualized with no evidence of pericecal inflammatory changes.   VESSELS: Chronic infrarenal abdominal aortic  atherosclerosis.   PERITONEUM/RETROPERITONEUM/LYMPH NODES: There is no free or loculated fluid collection, no free intraperitoneal air. The retroperitoneum appears normal.  No abdominopelvic lymphadenopathy is present.   BONES AND ABDOMINAL WALL: No suspicious osseous lesions are identified.  Left inguinal hernia as described above. 8 cm by 3 cm cystic mass along the left flank likely represents a benign lipoma.       1.  A 2.0 x 1.5 cm exophytic mass within the posterior wall of the gastric fundus, concerning for underlying neoplasm, possibly gastrointestinal stromal tumor (GIST). Recommend further evaluation via endoscopy. 2. Redemonstration of circumferential mural thickening of the distal esophagus with patulous esophagus, concerning for esophagitis. 3. Extensive sigmoid colonic diverticulosis without evidence of acute diverticulitis. 4. Interval decrease in size of small bowel containing left inguinal hernia compared to 07/02/2024 CT. No evidence bowel wall thickening or obstruction. 5. Additional chronic findings as described above.     I personally reviewed the images/study and I agree with the findings as stated. This study was interpreted at [place], [place].   MACRO: Jose Conner discussed the significance and urgency of this critical finding by telephone with Gonzalo Dodson DO on 7/10/2024 at 3:53 pm. (**-RCF-**) Findings:  See findings.   Signed by: Dilshad Alvarado 7/10/2024 4:10 PM Dictation workstation:   [id number]    CT angio chest for pulmonary embolism    Result Date: 7/10/2024  Interpreted By:  Mariel Alicia, STUDY: CT ANGIO CHEST FOR PULMONARY EMBOLISM;  7/10/2024 2:54 pm   INDICATION: Signs/Symptoms:syncope, hypoxia, wt loss.   COMPARISON: None   ACCESSION NUMBER(S): [id number]   ORDERING CLINICIAN: MARCIA FALCON   TECHNIQUE: Helical data acquisition of the chest was obtained after intravenous administration of 80 cc of Omnipaque 350 , as  per PE protocol. Images were reformatted in coronal and sagittal planes. Axial and coronal maximum intensity projection (MIP) images were created and reviewed.   FINDINGS: POTENTIAL LIMITATIONS OF THE STUDY: None   HEART AND VESSELS: There are no discrete filling defects within main pulmonary artery and its branches to suggest acute pulmonary embolism. Main pulmonary artery and its branches are normal in caliber.   The thoracic aorta normal in course and caliber. No coronary artery calcifications are seen. Please note, the study is not optimized for evaluation of coronary arteries.   The cardiac chambers are not enlarged.   There is no pericardial effusion seen.   MEDIASTINUM AND ARNAUD, LOWER NECK AND AXILLA: The visualized thyroid gland is within normal limits. No evidence of thoracic lymphadenopathy by CT criteria. Esophagus is patulous with some ingested material.   LUNGS AND AIRWAYS: The trachea and central airways are patent. No endobronchial lesion is seen.   The bilateral lungs are clear without evidence of focal consolidation, pleural effusion, or pneumothorax.   Mild upper lung predominant emphysema   4 mm left upper lobe nodule, image 61/352.     UPPER ABDOMEN: The visualized subdiaphragmatic structures demonstrate no remarkable findings.       CHEST WALL AND OSSEOUS STRUCTURES: Chest wall is within normal limits. No acute osseous pathology.There are no suspicious osseous lesions.   Partially imaged fat density lesion in the left flank measuring at least 7.2 x 3.3 cm, likely a lipoma.       1. No evidence of acute pulmonary embolism. 2. Mild upper lung predominant emphysema. 3. 4 mm left upper lobe nodule as described above. Instructions:  No further follow-up is required, however, if the patient has high risk factors for primary lung malignancy, follow-up noncontrast CT scan chest in 12 months may be obtained. (Manuelkayla Woohodinesh et al., Guidelines for management of incidental pulmonary nodules detected on CT  images: From the Fleischner Society 2017, Radiology. 2017 Jul;284 (1):228-243.) FLEISCHNER.ACR.IF.1 4. Patulous esophagus with debris and ingested material. Correlate with motility disorder and reflux. 5. Partially imaged fat density lesion in the left flank, likely a lipoma.   MACRO: None   Signed by: Mariel Hatch 7/10/2024 3:25 PM Dictation workstation:   [id number]    CT head wo IV contrast    Result Date: 7/10/2024  Interpreted By:  Britt Rubi, STUDY: CT HEAD WO IV CONTRAST   INDICATION: Signs/Symptoms:syncope, hit head   COMPARISON:   Head CT 09/23/2023   ACCESSION NUMBER(S): [id number]   ORDERING CLINICIAN: MARCIA FALCON   TECHNIQUE: CT of the head was performed without the administration of intravenous contrast.   FINDINGS: BRAIN PARENCHYMA: No acute intraparenchymal hemorrhage, acute territorial infarct or masses.   MIDLINE SHIFT OR HERNIATION: No midline shift or herniation.   VENTRICLES: No hydrocephalus.   DURAL VENOUS SINUSES: No hyperdensity to suggest acute thrombus.   EXTRA-AXIAL SPACES (epidural, subdural, subarachnoid spaces and basal cisterns): No collections.   VISUALIZED ORBITS: Normal.   VISUALIZED PARANASAL SINUSES AND MASTOID AIR CELLS: Paranasal sinuses are clear. Tympanomastoid cavities are aerated.   SOFT TISSUES: Normal.   OSSEOUS STRUCTURES: Normal.       No acute intracranial abnormality.   Signed by: Britt Rubi 7/10/2024 3:08 PM Dictation workstation:   [id number]    XR chest 1 view    Result Date: 7/10/2024  Interpreted By:  Rajesh Gill, STUDY: XR CHEST 1 VIEW;  7/10/2024 2:36 pm   INDICATION: Signs/Symptoms:hypoxia.   COMPARISON: 09/23/2023   ACCESSION NUMBER(S): [id number]   ORDERING CLINICIAN: MARCIA FALCON   FINDINGS:         CARDIOMEDIASTINAL SILHOUETTE: Cardiomediastinal silhouette is normal in size and configuration.   LUNGS: Lungs are hyperinflated. There is no consolidation or effusion. There is no edema   ABDOMEN: No remarkable upper abdominal  findings.   BONES: No acute osseous changes.       1. Hyperinflated lungs without acute abnormality       MACRO: None   Signed by: Rajesh Gill 7/10/2024 2:54 PM Dictation workstation:   [id number]    ECG 12 lead    Result Date: 7/3/2024  Normal sinus rhythm Nonspecific T wave abnormality Abnormal ECG When compared with ECG of 02-JUL-2024 20:34, QT has shortened Confirmed by Chan Salmon ([id number]) on 7/3/2024 11:52:28 AM    ECG 12 lead    Result Date: 7/3/2024  Normal sinus rhythm Left axis deviation Minimal voltage criteria for LVH, may be normal variant ( R in aVL ) T wave abnormality, consider anterior ischemia Prolonged QT Abnormal ECG When compared with ECG of 06-MAY-2024 10:58, Incomplete right bundle branch block is no longer Present Borderline criteria for Anterior infarct are no longer Present Borderline criteria for Anterolateral infarct are no longer Present See ED provider note for full interpretation and clinical correlation Confirmed by Peyton Ng ([id number]) on 7/3/2024 4:20:23 AM    CT abdomen pelvis w IV contrast    Result Date: 7/2/2024  Interpreted By:  Garfield Gregory and Bartolomei Aguilar Christopher STUDY: CT ABDOMEN PELVIS W IV CONTRAST;  7/2/2024 6:34 pm   INDICATION: Signs/Symptoms:right sided abdominal tenderness, n/v.   COMPARISON: CT abdomen and pelvis 05/06/2024   ACCESSION NUMBER(S): [id number]   ORDERING CLINICIAN: SELENA ALCARAZ   TECHNIQUE: Contiguous axial images of the abdomen and pelvis were obtained after the intravenous administration of iodinated contrast. Coronal and sagittal reformatted images were reconstructed from the axial data.   FINDINGS: LOWER CHEST: Mild centrilobular emphysema. No acute abnormality of the imaged lungs. Normal heart size. No pericardial effusion. Circumferential mural thickening of the distal esophagus with submucosal edema compatible with esophagitis; underlying neoplasm is not excluded. Recommend endoscopic correlation or further evaluation  nonemergent outpatient fluoroscopic barium swallow exam. A yellow alert was sent.     ABDOMEN/PELVIS:   ABDOMINAL WALL: Left inguinal hernia containing several loops of bowel, similar to prior and without evidence of strangulation or obstruction.   LIVER: Enlarged. Steatotic, with sparing about regions of liver periphery and anterior to saturnino hepatis. No focal lesion or evidence of acute injury.   BILE DUCTS: Within normal limits.   GALLBLADDER: No significant abnormality.   PANCREAS: Within normal limits.   SPLEEN: No significant abnormality.   ADRENALS: No significant abnormality.   KIDNEYS, URETERS, BLADDER: Incidental small bilateral cortical cysts. Otherwise, kidneys and ureters are normal. The urinary bladder is predominantly decompressed and suboptimally evaluated, without gross apparent abnormality.   REPRODUCTIVE ORGANS: No significant abnormality.   VESSELS: No significant abnormality.   RETROPERITONEUM/LYMPH NODES: No pathologically enlarged lymph nodes. No acute retroperitoneal abnormality.   BOWEL/MESENTERY/PERITONEUM: Gastric and small-bowel and large bowel mucosal hyperemia in keeping with gastritis, enteritis and colitis, respectively. No bowel dilation or definite abnormal wall thickening. Large left inguinal hernia extends into the scrotum and contains normal loops of small bowel and associated mesentery.. Colonic diverticulosis without evidence of acute diverticulitis. Appendix is not identified with certainty but no pericecal inflammatory changes are seen to suggest acute appendicitis.   No ascites, free air, or fluid collection.     MUSCULOSKELETAL: No acute osseous abnormality. No suspicious osseous lesion.       1. Gastric and small-bowel and large bowel mucosal hyperemia in keeping with gastritis, enteritis and colitis, respectively. No bowel dilation or definite abnormal wall thickening. Colonic diverticulosis without evidence of acute diverticulitis. Appendix is not identified with  certainty but no pericecal inflammatory changes are seen. 2. Otherwise, no evidence of acute pathology. 3. Several loops of bowel are contained within a large left inguinal hernia, similar to prior, without imaging evidence to suggest incarceration or strangulation. 4. Circumferential mural thickening of the distal esophagus with submucosal edema compatible with esophagitis; underlying neoplasm is not excluded. Recommend endoscopic correlation or further evaluation nonemergent outpatient fluoroscopic barium swallow exam.   I personally reviewed the images/study and I agree with the findings as stated by Atul Bucio MD (Radiology Resident). This study was interpreted at [place], [place].   MACRO: None.   Signed by: Garfield Gregory 7/2/2024 8:15 PM Dictation workstation:   [id number]    CT soft tissue neck w IV contrast    Result Date: 7/2/2024  Interpreted By:  Saroj Castillo, STUDY: CT SOFT TISSUE NECK W IV CONTRAST 7/2/2024 6:34 pm   INDICATION: Signs/Symptoms:bilateral lymphadenopathy, weight loss, concern for h/n cancer.   COMPARISON: None available.   ACCESSION NUMBER(S): [id number]   ORDERING CLINICIAN: PATRICK LUU   TECHNIQUE: CT axial images through the neck after the administration of 80 cc Omnipaque 350 IV contrast. Post processing sagittal and coronal reconstruction images were also performed.   All CT examinations are performed with 1 or more of the following dose reduction techniques: Automatic Exposure Control, Adjustment of mA and/or KV according to patient size, or use of iterative reconstruction techniques.   FINDINGS: There is no evidence of pathologic cervical lymphadenopathy, multiple small upper cervical lymph nodes noted, are not pathologically enlarged by CT size and morphologic criteria. There is no significant mucosal thickening or asymmetry in the nasopharynx, oropharynx, hypopharynx, base of tongue, floor of mouth, and  larynx. Parapharyngeal space is well maintained bilaterally. Negative for prevertebral or retropharyngeal fluid collections or soft tissue thickening. Major vessels of the neck are intact. Atherosclertic changes present in carotid bifurcations. Majors salivary and thyroid glands are of normal size and attenuation. No gross abnormality noted in the skull base and thoracic inlet.         CT of the soft tissues of the neck shows no evidence for mass, lymphadenopathy, or acute pathology.   MACRO: None.   Signed by: Saroj Castillo 7/2/2024 7:01 PM Dictation workstation:   [id number]       Assessment and Plan  Patient is a 58 year-old male with a past history of a known hernia. The etiology, pathophysiology, natural course, various treatment options for inguinal hernias were explained to the patient, and he voiced understanding. The risks and benefits of both laparoscopic and hernia repairs were explained to the patient. Based on his past surgical history and size of the hernia, I did recommend an open left hernia repair. Risks, including bleeding, infection, hernia, recurrence, anesthesia complications, injury to the testicle were all explained to the patient. His questions were answered, and he will be scheduled for the above procedure in the near future without the need for PAT.     Varun Rangel MD    No Opioids vs. Minimal Opioids Following Inguinal Hernia Repair: A Non-Inferiority Randomized Clinical Trial  Joe Alaniz, 1965, [id number]  [phone]  No e-mail address on record    Patient consented for study? Yes     IRB NO.: [id number]    : Varun Rangel MD    Phone: [phone]    COORDINATOR/Research Nurse/: Iliana Shore    Phone/email: [phone],  [email or web address]    Consenting was performed by the attending surgeon, in a face-to-face manner, during preoperative evaluation at the General Surgery clinic.    The study protocol was discussed  in detail with the patient. All study procedures were explained to the subject, including randomization, the two possible study interventions for all patients participating in the study. The importance of follow up compliance was stressed. The risks, benefits, alternatives to participation and potential costs were discussed.    All patient questions were addressed and answered. Patient has read and understood the study procedures and requirements. Patient has agreed to proceed with trial participation and consent signed. Copy of the signed consent provided to the patient.    INCLUSION CRITERIA Yes No   1.  The patient is > 18 years of age [x] []   2.  Elective inguinal hernia repair with mesh [x] []   3. The patient is willing and able to give written informed consent [x] []        EXCLUSION CRITERIA Yes No   1. The patient lacks English language fluency or cannot understand the consent form/study procedures [] [x]   2. Cannot tolerate general anesthesia [] [x]   3. Cannot tolerate opioids or NSAIDs [] [x]   4. Currently taking opioids for chronic pain management [] [x]   5. Surgery requiring extensive dissection/hernia sac reduction or additional procedures [] [x]   6. Surgery requiring inpatient admission postoperatively [] [x]     Preoperative inguinal hernia patient reported outcome form completed today in clinic

## 2024-07-19 NOTE — PROGRESS NOTES
History Of Present Illness  Patient is a 58-year-old male who presents with a known left inguinal hernia. He's known about it for approximately one year. This does cost him intermittent pain throughout the day and discomfort related to the hernia and its mass effect. He denies any episodes of nausea, vomiting. If the pain becomes significant, he lies down and self reduces the hernia which resolves his symptoms. he denies any episodes of strangulation or incarceration. Of note, he does have a past surgical history, including an exploratory laparotomy for perforated peptic ulcer disease in the 1980s.      Past Medical History  No past medical history on file.    Surgical History  No past surgical history on file.     Social History  He reports that he has quit smoking. His smoking use included cigarettes. He has quit using smokeless tobacco. He reports that he does not use drugs. No history on file for alcohol use.    Family History  No family history on file.     Allergies  Erythromycin    Review of Systems  - CONSTITUTIONAL: Denies fever and chills.  - HEENT: Denies changes in vision and hearing.  - RESPIRATORY: Denies SOB and cough.  - CV: Denies palpitations and CP.  - GI: Denies abdominal pain, nausea, vomiting and diarrhea.  - : Denies dysuria and urinary frequency.  - MSK: Denies myalgia and joint pain.  - SKIN: Denies rash and pruritus.  - NEUROLOGICAL: Denies headache and syncope.  - PSYCHIATRIC: Denies recent changes in mood. Denies anxiety and depression.     Physical Exam  - GENERAL: Alert and oriented x 3. No acute distress. Well-nourished.  - EYES: EOMI. Anicteric.  - HENT: Moist mucous membranes. No scleral icterus. No cervical lymphadenopathy.  - LUNGS: Breathing comfortably on room air. No accessory muscle use, no distress.  - CARDIOVASCULAR: Regular rate and rhythm. No murmur. No JVD.  - ABDOMEN: Soft, non-tender and non-distended. Fairly obvious chronically incarcerated left scrotal inguinal  "hernia   - EXTREMITIES: No edema. Non-tender.  - SKIN: No rashes or lesions. Warm.  - NEUROLOGIC: No focal neurological deficits. CN II-XII grossly intact, but not individually tested.  - PSYCHIATRIC: Cooperative. Appropriate mood and affect.    Last Recorded Vitals  Blood pressure (!) 130/96, pulse (!) 113, temperature 36 °C (96.8 °F), temperature source Temporal, height 1.676 m (5' 6\"), weight (!) 44.5 kg (98 lb), SpO2 99%.    Relevant Results  ECG 12 lead    Result Date: 7/17/2024  Normal sinus rhythm Possible Left atrial enlargement Left axis deviation Low voltage QRS Cannot rule out Anterior infarct , age undetermined Abnormal ECG When compared with ECG of 12-JUL-2024 10:25, T wave inversion no longer evident in Inferior leads    ECG 12 lead    Result Date: 7/15/2024  Sinus rhythm Left axis deviation Prolonged QT Abnormal ECG When compared with ECG of 11-JUL-2024 17:46, (unconfirmed) No significant change since Confirmed by Jorge Hopson ([id number]) on 7/15/2024 11:56:40 AM    ECG 12 Lead    Result Date: 7/15/2024  Sinus rhythm Left axis deviation Abnormal ECG When compared with ECG of 10-JUL-2024 20:19, ST elevation now present in Anterior leads QT has shortened Confirmed by Jorge Hopson ([id number]) on 7/15/2024 10:10:07 AM    Electrocardiogram, 12-lead PRN ACS symptoms    Result Date: 7/14/2024  Normal sinus rhythm Left axis deviation Abnormal ECG When compared with ECG of 12-JUL-2024 10:25, (unconfirmed) No significant change was found Confirmed by Jorge Hopson ([id number]) on 7/14/2024 9:24:17 PM    Endoscopic Ultrasound (Upper) w Biopsy    Result Date: 7/12/2024  Table formatting from the original result was not included. Findings Patchy grade D esophagitis with multiple mucosal breaks measuring 5 mm or more, continuous between folds, covering 75% or more of the circumference in the lower third of the esophagus; performed cold forceps biopsy Homogeneous, hypoechoic, round and submucosal mass measuring 18 mm x 16 mm was " visualized in the fundus of the stomach, contained within the muscularis propria; 1 successful fine needle biopsy pass was taken with a 22 gauge Covidien Sharkcore needle using a transgastric approach guided by Doppler. A sample was sent for histology analysis. 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 Severe, generalized atrophic and erythematous mucosa in the fundus of the stomach, body of the stomach, antrum and prepyloric region, consistent with gastritis; performed cold forceps biopsy to rule out H. pylori The pylorus appeared normal. The duodenal bulb, 1st part of the duodenum and 2nd part of the duodenum appeared normal. Recommendation Await pathology results Follow up with primary care physician (patient currently does not have one). Follow up with Cecelia Sadler MD - Inpatient Medicine Service. Follow up with Kerwin Cardozo MD and Magaly Franco MD - Inpatient GI Consult Service. Indication Odynophagia, Abnormal CT of the abdomen, Gastric mass, Esophageal thickening, Nausea and vomiting, unspecified vomiting type Staff Staff Role Kip Ramos MD Proceduralist Medications See Anesthesia Record. Preprocedure A history and physical has been performed, and patient medication allergies have been reviewed. The patient's tolerance of previous anesthesia has been reviewed. The risks and benefits of the procedure and the sedation options and risks were discussed with the patient. All questions were answered and informed consent obtained. Details of the Procedure The patient underwent monitored anesthesia care, which was administered by an anesthesia professional. The patient's blood pressure, heart rate, level of consciousness, oxygen, respirations, ECG and ETCO2 were monitored throughout the procedure. The endoscope, linear scope and radial scope were introduced through the mouth and advanced to the second part of the duodenum. Retroflexion was performed in the fundus. The patient's estimated blood loss  was minimal (<5 mL). The procedure was not difficult. The patient tolerated the procedure well. There were no apparent adverse events. Events Procedure Events Event Event Time ENDO SCOPE IN TIME 7/12/2024  2:06 PM ENDO SCOPE OUT TIME 7/12/2024  2:19 PM ENDO SCOPE IN TIME 7/12/2024  2:24 PM ENDO SCOPE OUT TIME 7/12/2024  2:46 PM Specimens ID Type Source Tests Collected by Time 1 : antrum, body, incisura Tissue STOMACH BODY/CORPUS BIOPSY SURGICAL PATHOLOGY EXAM Kip Ramos MD 7/12/2024 1414 2 :  Tissue ESOPHAGUS DISTAL BIOPSY SURGICAL PATHOLOGY EXAM Kip Ramos MD 7/12/2024 1416 3 : GIST FNB Tissue STOMACH FUNDUS BIOPSY SURGICAL PATHOLOGY EXAM Kip Ramos MD 7/12/2024 1446 Procedure Location [place] [address] [facility] [address] [place], [place] [id number] [phone] Referring Provider Magaly Franco MD Procedure Provider Kip Ramos MD     XR panorex    Result Date: 7/12/2024  Interpreted By:  Ijeoma Herrera, STUDY: XR PANOREX   INDICATION: Signs/Symptoms:L upper molar fracture vs decay causing signifcant pain   COMPARISON: None.   ACCESSION NUMBER(S): [id number]   ORDERING CLINICIAN: GERBER ARMENTA   FINDINGS: Single panoramic radiograph of the mandible was obtained. Periapical lucencies are noted involving the right mandibular 2nd premolar and right mandibular 1st molar consistent with periapical abscess. No discrete abnormality is appreciated involving the left maxillary teeth. Paranasal sinuses are clear. There is no evidence of mandibular dislocation or fracture.       1. Periapical lucencies are noted involving the right mandibular 2nd premolar and right mandibular 1st molar consistent with periapical abscess.   Signed by: Ijeoma Herrera 7/12/2024 10:17 AM Dictation workstation:   [id number]    Transthoracic Echo (TTE) Complete    Result Date: 7/11/2024   [facility] (formerly [facility])[3], [address] [place] [address]                 Tel [phone] and Fax [phone] TRANSTHORACIC ECHOCARDIOGRAM REPORT  Patient Name:      REGINE FARRIS        Reading Physician:    [id number] Killian Martinez MD Study Date:        7/11/2024            Ordering Provider:    [id number] GERBER ARMENTA MRN/PID:           [id number]             Fellow: Accession#:        [id number]         Nurse: Date of Birth/Age: 1965 / 58 years Sonographer:          ANOOP Chavez RDCS Gender:            M                    Additional Staff: Height:            167.64 cm            Admit Date: Weight:            44.45 kg             Admission Status:     Outpatient BSA / BMI:         1.48 m2 / 15.82      Encounter#:           [id number]                    kg/m2 Blood Pressure:    141/92 mmHg          Department Location:  [facility] Echo Lab Study Type:    TRANSTHORACIC ECHO (TTE) COMPLETE Diagnosis/ICD: Syncope-R55 Indication:    Syncope CPT Code:      Echo Complete w Full [id number] Patient History: Pertinent History: HTN and Syncope. Study Detail: The following Echo studies were performed: 2D, M-Mode, Doppler and               color flow.  PHYSICIAN INTERPRETATION: Left Ventricle: Left ventricular ejection fraction is normal, calculated by Aranda's biplane at 61%. Estimated left ventricular mass is normal. There are no regional wall motion abnormalities. The left ventricular cavity size is normal. There is normal left ventricular posterior wall thickness. Spectral Doppler shows a normal pattern of left ventricular diastolic filling. Left Atrium: The left atrium is normal in size. Right Ventricle: The right ventricle is normal in size. There is normal right ventricular global systolic function. Right Atrium: The right atrium is normal in size. The right atrium has a prominent  Eustachean valve. Aortic Valve: The aortic valve is probably trileaflet. There is no aortic valve cusp calcification noted. There is no aortic valve thickening. The aortic valve dimensionless index is 0.81. There is mild aortic valve regurgitation. The peak instantaneous gradient of the aortic valve is 5.1 mmHg. The mean gradient of the aortic valve is 2.6 mmHg.  ms. Mitral Valve: The mitral valve is normal in structure. There is no evidence of mitral valve regurgitation. Tricuspid Valve: The tricuspid valve is structurally normal. There is mild tricuspid regurgitation. The Doppler estimated RVSP is within normal limits at 25.5 mmHg. Pulmonic Valve: The pulmonic valve is structurally normal. There is mild pulmonic valve regurgitation. Pericardium: There is no pericardial effusion noted. Aorta: The aortic root is normal. Systemic Veins: The inferior vena cava size appears small. There is IVC inspiratory collapse greater than 50%. In comparison to the previous echocardiogram(s): There are no prior studies on this patient for comparison purposes.  CONCLUSIONS:  1. Left ventricular ejection fraction is normal, calculated by Aranda's biplane at 61%.  2. There is normal right ventricular global systolic function.  3. Mild aortic valve regurgitation.  4. RVSP within normal limits.  5. The inferior vena cava size appears small. QUANTITATIVE DATA SUMMARY: 2D MEASUREMENTS:                          Normal Ranges: Ao Root d:     3.40 cm   (2.0-3.7cm) LAs:           2.22 cm   (2.7-4.0cm) RVIDd:         2.75 cm   (0.9-3.6cm) IVSd:          0.84 cm   (0.6-1.1cm) LVPWd:         0.57 cm   (0.6-1.1cm) LVIDd:         3.30 cm   (3.9-5.9cm) LVIDs:         1.72 cm LV Mass Index: 46.6 g/m2 LV % FS        47.9 % LA VOLUME:                               Normal Ranges: LA Vol A4C:        30.4 ml    (22+/-6mL/m2) LA Vol A2C:        14.1 ml LA Vol BP:         23.9 ml LA Vol Index A4C:  20.6ml/m2 LA Vol Index A2C:  9.5 ml/m2 LA Vol  Index BP:   16.2 ml/m2 LA Area A4C:       10.7 cm2 LA Area A2C:       6.3 cm2 LA Major Axis A4C: 3.2 cm LA Major Axis A2C: 2.4 cm LA Volume Index:   16.0 ml/m2 M-MODE MEASUREMENTS:                  Normal Ranges: Ao Root: 2.80 cm (2.0-3.7cm) LAs:     3.18 cm (2.7-4.0cm) AORTA MEASUREMENTS:                      Normal Ranges: Ao Sinus, d: 3.68 cm (2.1-3.5cm) Asc Ao, d:   3.50 cm (2.1-3.4cm) LV SYSTOLIC FUNCTION BY 2D PLANIMETRY (MOD):                      Normal Ranges: EF-A4C View:    60 % (>=55%) EF-A2C View:    61 % EF-Biplane:     61 % LV EF Reported: 61 % LV DIASTOLIC FUNCTION:                               Normal Ranges: MV Peak E:        0.32 m/s    (0.7-1.2 m/s) MV Peak A:        0.68 m/s    (0.42-0.7 m/s) E/A Ratio:        0.47        (1.0-2.2) MV e'             0.095 m/s   (>8.0) MV lateral e'     0.13 m/s MV medial e'      0.06 m/s MV A Dur:         117.65 msec E/e' Ratio:       3.39        (<8.0) a'                0.08 m/s PulmV Sys Jesus:    42.38 cm/s PulmV Pulido Jesus:   21.62 cm/s PulmV S/D Jesus:    1.96 PulmV A Revs Jesus: 31.49 cm/s PulmV A Revs Dur: 76.12 msec MITRAL VALVE:                 Normal Ranges: MV DT: 106 msec (150-240msec) AORTIC VALVE:                                   Normal Ranges: AoV Vmax:                1.13 m/s (<=1.7m/s) AoV Peak P.1 mmHg (<20mmHg) AoV Mean P.6 mmHg (1.7-11.5mmHg) LVOT Max Jesus:            0.89 m/s (<=1.1m/s) AoV VTI:                 18.45 cm (18-25cm) LVOT VTI:                14.89 cm LVOT Diameter:           1.95 cm  (1.8-2.4cm) AoV Area, VTI:           2.41 cm2 (2.5-5.5cm2) AoV Area,Vmax:           2.37 cm2 (2.5-4.5cm2) AoV Dimensionless Index: 0.81 AORTIC INSUFFICIENCY: AI Vmax:       4.60 m/s AI Half-time:  798 msec AI Decel Time: 2751 msec AI Decel Rate: 167.82 cm/s2  RIGHT VENTRICLE: RV Basal 3.40 cm RV Mid   2.80 cm RV Major 6.3 cm TAPSE:   21.0 mm RV s'    0.12 m/s TRICUSPID VALVE/RVSP:                             Normal Ranges:  Peak TR Velocity: 2.37 m/s Est. RA Pressure: 3 mmHg RV Syst Pressure: 25.5 mmHg (< 30mmHg) IVC Diam:         0.07 cm PULMONIC VALVE:                         Normal Ranges: PV Accel Time: 95 msec  (>120ms) PV Max Jesus:    0.7 m/s  (0.6-0.9m/s) PV Max P.9 mmHg Pulmonary Veins: PulmV A Revs Dur: 76.12 msec PulmV A Revs Jesus: 31.49 cm/s PulmV Pulido Jesus:   21.62 cm/s PulmV S/D Jesus:    1.96 PulmV Sys Jesus:    42.38 cm/s AORTA: Asc Ao Diam 3.53 cm  [id number] Killian Martinez MD Electronically signed on 2024 at 5:22:48 PM  ** Final **     ECG 12 lead    Result Date: 2024  Sinus tachycardia Left axis deviation Inferior infarct , age undetermined Prolonged QT Abnormal ECG When compared with ECG of 2024 05:35, Significant changes have occurred See ED provider note for full interpretation and clinical correlation Confirmed by Sonja Nair ([id number]) on 2024 5:55:45 AM    ECG 12 lead    Result Date: 2024  Normal sinus rhythm Left axis deviation Prolonged QT Abnormal ECG See ED provider note for full interpretation and clinical correlation Confirmed by Sonja Nair ([id number]) on 2024 5:35:16 AM    CT abdomen pelvis w IV contrast    Result Date: 7/10/2024  Interpreted By:  Dilshad Alvarado and Stevens Alex STUDY: CT ABDOMEN PELVIS W IV CONTRAST;  7/10/2024 2:54 pm   INDICATION: Signs/Symptoms:abd pain, hx of perforated ulcer. n/v/d. Per EMR, 58-year-old male with past medical history of hypertension BPH as well as remote history of perforated gastric ulcer presents to the ED with nausea vomiting diarrhea diffuse abdominal pain as well as generalized malaise. States he has been having these symptoms are proximally 2 weeks. Endorses having significant weight loss of approximately 45 pounds for the past 2-3 weeks. He states that the no small amount of bright red blood in emesis.   COMPARISON: CT abdomen and pelvis 2024.   ACCESSION NUMBER(S): [id number]   ORDERING CLINICIAN: MARCIA FALCON    TECHNIQUE: CT of the abdomen and pelvis was performed.  Standard contiguous axial images were obtained at 3 mm slice thickness through the abdomen and pelvis. Coronal and sagittal reconstructions at 3 mm slice thickness were performed.   80 ml of contrast Omnipaque 350 were administered intravenously without immediate complication.   FINDINGS: LOWER CHEST: Mild central lobar emphysema. The heart is normal in size without pericardial effusion. No pleural effusion is present. Redemonstration of circumferential mural thickening of the distal esophagus with submucosal edema.   ABDOMEN:   LIVER: Liver is diffusely enlarged with fatty infiltration. No focal lesions.   BILE DUCTS: The intrahepatic and extrahepatic ducts are not dilated.   GALLBLADDER: No calcified stones. No wall thickening.   PANCREAS: The pancreas appears unremarkable.   SPLEEN: Within normal limits.   ADRENAL GLANDS: Bilateral adrenal glands appear normal.   KIDNEYS AND URETERS: Re-demonstration of small hypodense bilateral cortical cysts. Kidneys are normal in size enhance symmetrically. No hydroureteronephrosis or nephroureterolithiasis is identified.   PELVIS:   BLADDER: The urinary bladder is decompressed, limited for evaluation.   REPRODUCTIVE ORGANS: The prostate is not enlarged.   BOWEL: Re-demonstration of gastric, small and large bowel mucosal hyperemia. There is in exophytic mass within the fundus of the stomach measuring approximately 2 cm x 1.5 cm as evidenced on series 601, image 38. Increased enhancement along the wall of the lower rectum and anal, which is nonspecific but may represent hemorrhoids. Otherwise, no evidence of bowel wall thickening dilation. Interval decrease in size of left inguinal hernia containing decompressed small bowel loops. Sigmoid diverticulosis without evidence of acute diverticulitis. Appendix is not visualized with no evidence of pericecal inflammatory changes.   VESSELS: Chronic infrarenal abdominal aortic  atherosclerosis.   PERITONEUM/RETROPERITONEUM/LYMPH NODES: There is no free or loculated fluid collection, no free intraperitoneal air. The retroperitoneum appears normal.  No abdominopelvic lymphadenopathy is present.   BONES AND ABDOMINAL WALL: No suspicious osseous lesions are identified.  Left inguinal hernia as described above. 8 cm by 3 cm cystic mass along the left flank likely represents a benign lipoma.       1.  A 2.0 x 1.5 cm exophytic mass within the posterior wall of the gastric fundus, concerning for underlying neoplasm, possibly gastrointestinal stromal tumor (GIST). Recommend further evaluation via endoscopy. 2. Redemonstration of circumferential mural thickening of the distal esophagus with patulous esophagus, concerning for esophagitis. 3. Extensive sigmoid colonic diverticulosis without evidence of acute diverticulitis. 4. Interval decrease in size of small bowel containing left inguinal hernia compared to 07/02/2024 CT. No evidence bowel wall thickening or obstruction. 5. Additional chronic findings as described above.     I personally reviewed the images/study and I agree with the findings as stated. This study was interpreted at [place], [place].   MACRO: Jose Conner discussed the significance and urgency of this critical finding by telephone with Gonzalo Dodson DO on 7/10/2024 at 3:53 pm. (**-RCF-**) Findings:  See findings.   Signed by: Dilshad Alvarado 7/10/2024 4:10 PM Dictation workstation:   [id number]    CT angio chest for pulmonary embolism    Result Date: 7/10/2024  Interpreted By:  Mariel Alicia, STUDY: CT ANGIO CHEST FOR PULMONARY EMBOLISM;  7/10/2024 2:54 pm   INDICATION: Signs/Symptoms:syncope, hypoxia, wt loss.   COMPARISON: None   ACCESSION NUMBER(S): [id number]   ORDERING CLINICIAN: MARCIA FALCON   TECHNIQUE: Helical data acquisition of the chest was obtained after intravenous administration of 80 cc of Omnipaque 350 , as  per PE protocol. Images were reformatted in coronal and sagittal planes. Axial and coronal maximum intensity projection (MIP) images were created and reviewed.   FINDINGS: POTENTIAL LIMITATIONS OF THE STUDY: None   HEART AND VESSELS: There are no discrete filling defects within main pulmonary artery and its branches to suggest acute pulmonary embolism. Main pulmonary artery and its branches are normal in caliber.   The thoracic aorta normal in course and caliber. No coronary artery calcifications are seen. Please note, the study is not optimized for evaluation of coronary arteries.   The cardiac chambers are not enlarged.   There is no pericardial effusion seen.   MEDIASTINUM AND ARNAUD, LOWER NECK AND AXILLA: The visualized thyroid gland is within normal limits. No evidence of thoracic lymphadenopathy by CT criteria. Esophagus is patulous with some ingested material.   LUNGS AND AIRWAYS: The trachea and central airways are patent. No endobronchial lesion is seen.   The bilateral lungs are clear without evidence of focal consolidation, pleural effusion, or pneumothorax.   Mild upper lung predominant emphysema   4 mm left upper lobe nodule, image 61/352.     UPPER ABDOMEN: The visualized subdiaphragmatic structures demonstrate no remarkable findings.       CHEST WALL AND OSSEOUS STRUCTURES: Chest wall is within normal limits. No acute osseous pathology.There are no suspicious osseous lesions.   Partially imaged fat density lesion in the left flank measuring at least 7.2 x 3.3 cm, likely a lipoma.       1. No evidence of acute pulmonary embolism. 2. Mild upper lung predominant emphysema. 3. 4 mm left upper lobe nodule as described above. Instructions:  No further follow-up is required, however, if the patient has high risk factors for primary lung malignancy, follow-up noncontrast CT scan chest in 12 months may be obtained. (Manuelkayla Woohodinesh et al., Guidelines for management of incidental pulmonary nodules detected on CT  images: From the Fleischner Society 2017, Radiology. 2017 Jul;284 (1):228-243.) FLEISCHNER.ACR.IF.1 4. Patulous esophagus with debris and ingested material. Correlate with motility disorder and reflux. 5. Partially imaged fat density lesion in the left flank, likely a lipoma.   MACRO: None   Signed by: Mariel Hatch 7/10/2024 3:25 PM Dictation workstation:   [id number]    CT head wo IV contrast    Result Date: 7/10/2024  Interpreted By:  Britt Rubi, STUDY: CT HEAD WO IV CONTRAST   INDICATION: Signs/Symptoms:syncope, hit head   COMPARISON:   Head CT 09/23/2023   ACCESSION NUMBER(S): [id number]   ORDERING CLINICIAN: MARCIA FALCON   TECHNIQUE: CT of the head was performed without the administration of intravenous contrast.   FINDINGS: BRAIN PARENCHYMA: No acute intraparenchymal hemorrhage, acute territorial infarct or masses.   MIDLINE SHIFT OR HERNIATION: No midline shift or herniation.   VENTRICLES: No hydrocephalus.   DURAL VENOUS SINUSES: No hyperdensity to suggest acute thrombus.   EXTRA-AXIAL SPACES (epidural, subdural, subarachnoid spaces and basal cisterns): No collections.   VISUALIZED ORBITS: Normal.   VISUALIZED PARANASAL SINUSES AND MASTOID AIR CELLS: Paranasal sinuses are clear. Tympanomastoid cavities are aerated.   SOFT TISSUES: Normal.   OSSEOUS STRUCTURES: Normal.       No acute intracranial abnormality.   Signed by: Britt Rubi 7/10/2024 3:08 PM Dictation workstation:   [id number]    XR chest 1 view    Result Date: 7/10/2024  Interpreted By:  Rajesh Gill, STUDY: XR CHEST 1 VIEW;  7/10/2024 2:36 pm   INDICATION: Signs/Symptoms:hypoxia.   COMPARISON: 09/23/2023   ACCESSION NUMBER(S): [id number]   ORDERING CLINICIAN: MARCIA FALCON   FINDINGS:         CARDIOMEDIASTINAL SILHOUETTE: Cardiomediastinal silhouette is normal in size and configuration.   LUNGS: Lungs are hyperinflated. There is no consolidation or effusion. There is no edema   ABDOMEN: No remarkable upper abdominal  findings.   BONES: No acute osseous changes.       1. Hyperinflated lungs without acute abnormality       MACRO: None   Signed by: Rajesh Gill 7/10/2024 2:54 PM Dictation workstation:   [id number]    ECG 12 lead    Result Date: 7/3/2024  Normal sinus rhythm Nonspecific T wave abnormality Abnormal ECG When compared with ECG of 02-JUL-2024 20:34, QT has shortened Confirmed by Chan Salmon ([id number]) on 7/3/2024 11:52:28 AM    ECG 12 lead    Result Date: 7/3/2024  Normal sinus rhythm Left axis deviation Minimal voltage criteria for LVH, may be normal variant ( R in aVL ) T wave abnormality, consider anterior ischemia Prolonged QT Abnormal ECG When compared with ECG of 06-MAY-2024 10:58, Incomplete right bundle branch block is no longer Present Borderline criteria for Anterior infarct are no longer Present Borderline criteria for Anterolateral infarct are no longer Present See ED provider note for full interpretation and clinical correlation Confirmed by Peyton Ng ([id number]) on 7/3/2024 4:20:23 AM    CT abdomen pelvis w IV contrast    Result Date: 7/2/2024  Interpreted By:  Garfield Gregory and Bartolomei Aguilar Christopher STUDY: CT ABDOMEN PELVIS W IV CONTRAST;  7/2/2024 6:34 pm   INDICATION: Signs/Symptoms:right sided abdominal tenderness, n/v.   COMPARISON: CT abdomen and pelvis 05/06/2024   ACCESSION NUMBER(S): [id number]   ORDERING CLINICIAN: SELENA ALCARAZ   TECHNIQUE: Contiguous axial images of the abdomen and pelvis were obtained after the intravenous administration of iodinated contrast. Coronal and sagittal reformatted images were reconstructed from the axial data.   FINDINGS: LOWER CHEST: Mild centrilobular emphysema. No acute abnormality of the imaged lungs. Normal heart size. No pericardial effusion. Circumferential mural thickening of the distal esophagus with submucosal edema compatible with esophagitis; underlying neoplasm is not excluded. Recommend endoscopic correlation or further evaluation  nonemergent outpatient fluoroscopic barium swallow exam. A yellow alert was sent.     ABDOMEN/PELVIS:   ABDOMINAL WALL: Left inguinal hernia containing several loops of bowel, similar to prior and without evidence of strangulation or obstruction.   LIVER: Enlarged. Steatotic, with sparing about regions of liver periphery and anterior to saturnino hepatis. No focal lesion or evidence of acute injury.   BILE DUCTS: Within normal limits.   GALLBLADDER: No significant abnormality.   PANCREAS: Within normal limits.   SPLEEN: No significant abnormality.   ADRENALS: No significant abnormality.   KIDNEYS, URETERS, BLADDER: Incidental small bilateral cortical cysts. Otherwise, kidneys and ureters are normal. The urinary bladder is predominantly decompressed and suboptimally evaluated, without gross apparent abnormality.   REPRODUCTIVE ORGANS: No significant abnormality.   VESSELS: No significant abnormality.   RETROPERITONEUM/LYMPH NODES: No pathologically enlarged lymph nodes. No acute retroperitoneal abnormality.   BOWEL/MESENTERY/PERITONEUM: Gastric and small-bowel and large bowel mucosal hyperemia in keeping with gastritis, enteritis and colitis, respectively. No bowel dilation or definite abnormal wall thickening. Large left inguinal hernia extends into the scrotum and contains normal loops of small bowel and associated mesentery.. Colonic diverticulosis without evidence of acute diverticulitis. Appendix is not identified with certainty but no pericecal inflammatory changes are seen to suggest acute appendicitis.   No ascites, free air, or fluid collection.     MUSCULOSKELETAL: No acute osseous abnormality. No suspicious osseous lesion.       1. Gastric and small-bowel and large bowel mucosal hyperemia in keeping with gastritis, enteritis and colitis, respectively. No bowel dilation or definite abnormal wall thickening. Colonic diverticulosis without evidence of acute diverticulitis. Appendix is not identified with  certainty but no pericecal inflammatory changes are seen. 2. Otherwise, no evidence of acute pathology. 3. Several loops of bowel are contained within a large left inguinal hernia, similar to prior, without imaging evidence to suggest incarceration or strangulation. 4. Circumferential mural thickening of the distal esophagus with submucosal edema compatible with esophagitis; underlying neoplasm is not excluded. Recommend endoscopic correlation or further evaluation nonemergent outpatient fluoroscopic barium swallow exam.   I personally reviewed the images/study and I agree with the findings as stated by Atul Bucio MD (Radiology Resident). This study was interpreted at [place], [place].   MACRO: None.   Signed by: Garfield Gregory 7/2/2024 8:15 PM Dictation workstation:   [id number]    CT soft tissue neck w IV contrast    Result Date: 7/2/2024  Interpreted By:  Saroj Castillo, STUDY: CT SOFT TISSUE NECK W IV CONTRAST 7/2/2024 6:34 pm   INDICATION: Signs/Symptoms:bilateral lymphadenopathy, weight loss, concern for h/n cancer.   COMPARISON: None available.   ACCESSION NUMBER(S): [id number]   ORDERING CLINICIAN: PATRICK LUU   TECHNIQUE: CT axial images through the neck after the administration of 80 cc Omnipaque 350 IV contrast. Post processing sagittal and coronal reconstruction images were also performed.   All CT examinations are performed with 1 or more of the following dose reduction techniques: Automatic Exposure Control, Adjustment of mA and/or KV according to patient size, or use of iterative reconstruction techniques.   FINDINGS: There is no evidence of pathologic cervical lymphadenopathy, multiple small upper cervical lymph nodes noted, are not pathologically enlarged by CT size and morphologic criteria. There is no significant mucosal thickening or asymmetry in the nasopharynx, oropharynx, hypopharynx, base of tongue, floor of mouth, and  larynx. Parapharyngeal space is well maintained bilaterally. Negative for prevertebral or retropharyngeal fluid collections or soft tissue thickening. Major vessels of the neck are intact. Atherosclertic changes present in carotid bifurcations. Majors salivary and thyroid glands are of normal size and attenuation. No gross abnormality noted in the skull base and thoracic inlet.         CT of the soft tissues of the neck shows no evidence for mass, lymphadenopathy, or acute pathology.   MACRO: None.   Signed by: Saroj Castillo 7/2/2024 7:01 PM Dictation workstation:   [id number]       Assessment and Plan  Patient is a 58 year-old male with a past history of a known hernia. The etiology, pathophysiology, natural course, various treatment options for inguinal hernias were explained to the patient, and he voiced understanding. The risks and benefits of both laparoscopic and hernia repairs were explained to the patient. Based on his past surgical history and size of the hernia, I did recommend an open left hernia repair. Risks, including bleeding, infection, hernia, recurrence, anesthesia complications, injury to the testicle were all explained to the patient. His questions were answered, and he will be scheduled for the above procedure in the near future without the need for PAT.     Varun Rangel MD    No Opioids vs. Minimal Opioids Following Inguinal Hernia Repair: A Non-Inferiority Randomized Clinical Trial  Joe Alaniz, 1965, [id number]  [phone]  No e-mail address on record    Patient consented for study? Yes     IRB NO.: [id number]    : Varun Rangel MD    Phone: [phone]    COORDINATOR/Research Nurse/: Iliana Shore    Phone/email: [phone],  [email or web address]    Consenting was performed by the attending surgeon, in a face-to-face manner, during preoperative evaluation at the General Surgery clinic.    The study protocol was discussed  in detail with the patient. All study procedures were explained to the subject, including randomization, the two possible study interventions for all patients participating in the study. The importance of follow up compliance was stressed. The risks, benefits, alternatives to participation and potential costs were discussed.    All patient questions were addressed and answered. Patient has read and understood the study procedures and requirements. Patient has agreed to proceed with trial participation and consent signed. Copy of the signed consent provided to the patient.    INCLUSION CRITERIA Yes No   1.  The patient is > 18 years of age [x] []   2.  Elective inguinal hernia repair with mesh [x] []   3. The patient is willing and able to give written informed consent [x] []        EXCLUSION CRITERIA Yes No   1. The patient lacks English language fluency or cannot understand the consent form/study procedures [] [x]   2. Cannot tolerate general anesthesia [] [x]   3. Cannot tolerate opioids or NSAIDs [] [x]   4. Currently taking opioids for chronic pain management [] [x]   5. Surgery requiring extensive dissection/hernia sac reduction or additional procedures [] [x]   6. Surgery requiring inpatient admission postoperatively [] [x]     Preoperative inguinal hernia patient reported outcome form completed today in clinic

## 2024-07-21 RX ORDER — L. ACIDOPHILUS/L.BULGARICUS 1MM CELL
1 TABLET ORAL
COMMUNITY
Start: 2023-09-25

## 2024-07-22 PROBLEM — K40.90 LEFT INGUINAL HERNIA: Status: ACTIVE | Noted: 2024-07-19

## 2024-07-24 ENCOUNTER — APPOINTMENT (OUTPATIENT)
Dept: PRIMARY CARE | Facility: CLINIC | Age: 59
End: 2024-07-24
Payer: COMMERCIAL

## 2024-07-24 ENCOUNTER — TELEPHONE (OUTPATIENT)
Dept: HEMATOLOGY/ONCOLOGY | Facility: HOSPITAL | Age: 59
End: 2024-07-24

## 2024-07-24 NOTE — TELEPHONE ENCOUNTER
7/24/2024 @ 1042:  Call transfer received from Adin Cruz [facility] Patient Navigator.  Patient inquired about the location for his upcoming appointment in our [facility] on Fri., 7/26/2024 @ 11a; he asked if his appointment could be changed to a virtual appointment, if possible, as it will be easier for him.  He states he uses [employer] via his mobile telephone and has video/camera capability.  Advised patient that I will ask Juana Bay CNP about changing his in-person visit to a virtual visit and then I will call him with an update.  Patient verbalized agreement with this plan.    Conveyed Happy Birthday wishes to the patient; he states he is having a good day, today and is looking forward to having dinner with his son, this evening.    Provided patient with our [facility] contact information; patient stated understanding to call us with any additional questions or concerns.    Update with patient's request, as noted above, sent to STEPHAN Bay CNP.      Maria E Christy RN  [facility] - RN Care Coordinator  Office:  [phone]    7/25/2024 @ 1410:  Spoke with patient; confirmed that his appointment with Juana Bay CNP, tomorrow at 11 a.m., will be changed to a virtual visit, per his request.   Patient voiced appreciation; states awareness that he should click the appointment link, from the appointment message in [employer], 15 minutes prior to his appointment, follow the directions in the appointment message and then wait for Juana to join the visit.   Patient states awareness of my contact information and that he will call me if he has any questions or difficulty with the appointment link.    Maria E Christy RN  [facility] Diagnostic [facility] - RN Care Coordinator  Office:  [phone]

## 2024-07-26 ENCOUNTER — TELEPHONE (OUTPATIENT)
Dept: SCHEDULING | Age: 59
End: 2024-07-26

## 2024-07-26 ENCOUNTER — TELEMEDICINE (OUTPATIENT)
Dept: HEMATOLOGY/ONCOLOGY | Facility: HOSPITAL | Age: 59
End: 2024-07-26
Payer: COMMERCIAL

## 2024-07-26 DIAGNOSIS — Z12.12 SCREENING FOR COLORECTAL CANCER: ICD-10-CM

## 2024-07-26 DIAGNOSIS — R63.4 WEIGHT LOSS, UNINTENTIONAL: ICD-10-CM

## 2024-07-26 DIAGNOSIS — C49.A2 MALIGNANT GASTROINTESTINAL STROMAL TUMOR (GIST) OF STOMACH (MULTI): Primary | ICD-10-CM

## 2024-07-26 DIAGNOSIS — Z12.11 SCREENING FOR COLORECTAL CANCER: ICD-10-CM

## 2024-07-26 DIAGNOSIS — K31.89 GASTRIC MASS: ICD-10-CM

## 2024-07-26 LAB
LAB AP ASR DISCLAIMER: NORMAL
LABORATORY COMMENT REPORT: NORMAL
PATH REPORT.FINAL DX SPEC: NORMAL
PATH REPORT.GROSS SPEC: NORMAL
PATH REPORT.TOTAL CANCER: NORMAL

## 2024-07-26 NOTE — PROGRESS NOTES
Subjective   Patient ID: Joe Alaniz is a 59 y.o. male Hx of malignant gastrointestinal stromal tumor of stomach, HTN, BPH, left inguinal hernia, PUD w/perforated ulcer s/p surgery, history of nicotine and alcohol addiction quit who presents for No chief complaint on file..    HPI the patient is presented to become established as a new patient with a primary care provider.    Left inguinal hernia surgery?      Review of Systems    Objective   There were no vitals taken for this visit.    Physical Exam    Assessment/Plan   {Assess/PlanSmartLinks:70319}  Malignant gastrointestinal stromal tumor of stomach   oncology Dr Muhammad     Hypertension  Blood pressure well controlled  Continue amlodipine 10 mg  Continue hydrochlorothiazide 25 mg daily  low sodium diet, DASH or Mediterranean diet reccommended   Try to exercise 30 minutes daily    BPH    left inguinal hernia  Surgery scheduled    PUD w/perforated ulcer   s/p surgery  Continue pantoprazole 40 mg twice daily    history of nicotine and alcohol addiction  Quit     Vitamin D deficiency  Continue vitamin D 50,000 units weekly

## 2024-07-26 NOTE — TELEPHONE ENCOUNTER
Joe is due for his prostate check and he wanted to get a PSA(blood work) done. He is scheduled to see his pcp Josephine Gilmore PA-C on 8/9. I called Josephine office on 7/26 at 9:05 and left a message stating when Joe comes for his appointment on 8/9 if Josephine can do his PSA test than.

## 2024-07-29 ENCOUNTER — NUTRITION (OUTPATIENT)
Dept: HEMATOLOGY/ONCOLOGY | Facility: HOSPITAL | Age: 59
End: 2024-07-29
Payer: COMMERCIAL

## 2024-07-29 ASSESSMENT — ENCOUNTER SYMPTOMS
MYALGIAS: 0
FATIGUE: 0
ABDOMINAL PAIN: 0
APPETITE CHANGE: 1
FEVER: 0
VOMITING: 0
HEMATURIA: 0
DIARRHEA: 0
BLOOD IN STOOL: 0
SHORTNESS OF BREATH: 0
UNEXPECTED WEIGHT CHANGE: 1
ADENOPATHY: 0
CHILLS: 1
EYE PROBLEMS: 0
COUGH: 0
SPEECH DIFFICULTY: 0
CONSTIPATION: 0
TROUBLE SWALLOWING: 0
NAUSEA: 0
DIZZINESS: 0

## 2024-07-29 NOTE — PROGRESS NOTES
[facility]  Diagnostic Clinic  New Patient Virtual Visit    Date of Service: 24      Patient Name: Joe Alaniz   : 1965  MRN: [id number]   PCP: No Assigned PCP Generic Provider, MD   Referred by: Marlena Bacon DO    Problem: gastric mass    HPI: Joe Alaniz is a 59 y.o. year old male w/ PMH HTN, PUD w/ perforated ulcer, BPH, and left inguinal hernia who was referred to the [facility] with gastric mass, referral placed by [facility] inpatient team. Mr. Alaniz presented to the ED on 7/10/24 w/ complaint of syncopal event, nausea/vomiting, diarrhea and unintentional wt loss. He noted 50 lb unintentional wt loss over preceding 2-3 months. He underwent CT A/P which showed 2.0 x 1.5 cm exophytic mass within the posterior wall of the gastric fundus, concerning for neoplasm. He subsequently underwent EGD on 24, which showed grade D esophagitis and gastritis, as well as a homogeneous, hypoechoic, round and submucosal mass measuring 18 mm x 16 mm was visualized in the fundus of the stomach. Biopsies were taken. He was referred to the diagnostic clinic for follow-up of his path results and discharged home on PPI BID.     Since hospital discharge, Mr Alaniz has quit both drinking alcohol and smoking. He has been taking his PPI BID and notes improvement in his oral intake and reflux symptoms. He denies nausea/vomiting/diarrhea since hospital discharge. No fevers or night sweats; +occasional chills. He thinks his weight is stable now, but endorses weighing ~39 lbs less than his baseline weight.    History of tobacco use:   39yr Hx of smoking cigarettes ~1ppd, quit earlier this month during hospitalization.    Quit drinking alcohol during recent hospitalization, given thiamine/folic acid while admitted. States he drank ~1/2 pint of liquor per wk for years.    Denies illicit drug use.    Past personal history of malignancy:   None    Family history of malignancy:   Father - cancer, unknown  type  Paternal grandmother - cancer, unknown type  Paternal uncle - liver cancer      PATHOLOGY:  A. STOMACH, ANTRUM, BODY, INCISURA, BIOPSY:   -- Gastric antral- and oxyntic-type mucosa with mild reactive epithelial change and focal minimal acute and chronic inflammation.    -- No Helicobacter pylori organisms identified on routine stained slides.       B. ESOPHAGUS DISTAL BIOPSY:   -- Superficial strips of squamous esophageal mucosa with acute inflammation and reactive epithelial change.  -- Detached fragments of fibrinoinflammatory exudate (ulceration).   -- No intestinal metaplasia identified.    -- See note.     Note:  GMS stain is negative for fungal elements.  HSV immunohistochemical stain is negative in the cells of interest.       : Dr. RENETTA HERNDON GIST FNB:   -- Gastrointestinal stromal tumor (GIST).  See note.     Note:  Minute fragments of a spindle cell neoplasm are identified.  No mitotic figures or necrosis are appreciated on this limited biopsy specimen.       DOG-1, desmin, and S100 immunohistochemical stains are performed.  The neoplastic cell are positive for DOG-1; and negative for desmin and S100.  Overall, the morphologic and immunohistochemical staining profile support the above diagnosis.         IMAGING:  === 7/10/24 ===    CT ABD /PELVIS w/ con    IMPRESSION:  1.  A 2.0 x 1.5 cm exophytic mass within the posterior wall of the  gastric fundus, concerning for underlying neoplasm, possibly  gastrointestinal stromal tumor (GIST). Recommend further evaluation  via endoscopy.  2. Redemonstration of circumferential mural thickening of the distal  esophagus with patulous esophagus, concerning for esophagitis.  3. Extensive sigmoid colonic diverticulosis without evidence of acute  diverticulitis.  4. Interval decrease in size of small bowel containing left inguinal  hernia compared to 07/02/2024 CT. No evidence bowel wall thickening  or obstruction.  5. Additional chronic findings  as described above.        === 07/10/24 ===    CT ANGIO CHEST FOR PULMONARY EMBOLISM    - Impression -  1. No evidence of acute pulmonary embolism.  2. Mild upper lung predominant emphysema.  3. 4 mm left upper lobe nodule as described above.  Instructions:  No further follow-up is required, however, if the  patient has high risk factors for primary lung malignancy, follow-up  noncontrast CT scan chest in 12 months may be obtained. (Manuel Pope et al., Guidelines for management of incidental pulmonary  nodules detected on CT images: From the Fleischner Society 2017,  Radiology. 2017 Jul;284 (1):228-243.) [email or web address]  4. Patulous esophagus with debris and ingested material. Correlate  with motility disorder and reflux.  5. Partially imaged fat density lesion in the left flank, likely a  lipoma.      XR PANOREX    - Impression -  1. Periapical lucencies are noted involving the right mandibular 2nd  premolar and right mandibular 1st molar consistent with periapical  abscess.      LABS:  Inpatient lab work reviewed.    7/11/24: CEA  Carcinoembryonic AG  ug/L 3.8   Resulting Agency [facility]              Narrative  Performed by: [facility]   REF VALUES   NONSMOKERS 0-2.5   SMOKERS    0-5.0          7/11/24: CA 19-9  Cancer AG 19-9  <35.00 U/mL 24.77   Resulting Agency [facility]         PAST MEDICAL HISTORY:  Updated  Past Medical History:   Diagnosis Date    BPH (benign prostatic hyperplasia)     Dental caries     Former smoker     HTN (hypertension)     PUD (peptic ulcer disease)        PAST SURGICAL HISTORY:  Updated  Past Surgical History:   Procedure Laterality Date    SMALL INTESTINE SURGERY      PUD w/ perforated ulcer, s/p surgical resection       SOCIAL HISTORY:  Social Connections: Not on file       FAMILY HISTORY:  Updated  Family History   Problem Relation Name Age of Onset    Cancer Father      Liver cancer Father's Brother      Cancer Paternal Grandmother       Current Outpatient Medications on File Prior to  Visit   Medication Sig Dispense Refill    acetaminophen (Tylenol) 500 mg tablet Take 2 tablets (1,000 mg) by mouth every 6 hours if needed for mild pain (1 - 3). 30 tablet 0    amLODIPine (Norvasc) 10 mg tablet Take 1 tablet (10 mg) by mouth once daily. 30 tablet 0    cyanocobalamin (Vitamin B-12) 1,000 mcg tablet Take 1 tablet (1,000 mcg) by mouth once daily. 30 tablet 0    diphenhydramine/Maalox/lidocaine (Magic Mouthwash) - Compounded - Outpatient Swish and spit every 4-6 hours as needed for mouth sores. 120 mL 0    ergocalciferol (Vitamin D-2) 1.25 MG (12843 UT) capsule Take 1 capsule (50,000 Units) by mouth 1 (one) time per week. 8 capsule 0    ferrous sulfate, 325 mg ferrous sulfate, tablet Take 1 tablet by mouth once daily with breakfast. 30 tablet 0    Floranex 1 million cell tablet tablet Take 1 tablet by mouth early in the morning..      folic acid (Folvite) 1 mg tablet Take 1 tablet (1 mg) by mouth once daily. 30 tablet 0    hydroCHLOROthiazide (HYDRODiuril) 25 mg tablet Take 0.5 tablets (12.5 mg) by mouth once daily. 15 tablet 0    multivitamin with minerals tablet Take 1 tablet by mouth once daily. 30 tablet 0    pantoprazole (ProtoNix) 40 mg EC tablet Take 1 tablet (40 mg) by mouth 2 times a day before meals. Do not crush, chew, or split. 60 tablet 0    psyllium (Metamucil) 3.4 gram packet Take 1 packet by mouth 2 times a day. Mix and drink with at least 8 ounces of water or juice. (Patient taking differently: Take 1 packet by mouth if needed. Mix and drink with at least 8 ounces of water or juice.) 60 packet 0     No current facility-administered medications on file prior to visit.         REVIEW OF SYSTEMS:  Review of Systems   Constitutional:  Positive for appetite change, chills and unexpected weight change. Negative for fatigue and fever.        See HPI   HENT:   Negative for hearing loss and trouble swallowing.    Eyes:  Negative for eye problems.   Respiratory:  Negative for cough and shortness  of breath.    Cardiovascular:  Negative for chest pain.   Gastrointestinal:  Negative for abdominal pain, blood in stool, constipation, diarrhea, nausea and vomiting.   Genitourinary:  Negative for hematuria.    Musculoskeletal:  Negative for myalgias.   Skin:  Negative for rash.   Neurological:  Negative for dizziness and speech difficulty.   Hematological:  Negative for adenopathy.     OBJECTIVE:  There were no vitals taken for this visit.  Deferred due to virtual visit.    ASSESSMENT:  Joe Alaniz is a 59 y.o. year old male w/ PMH HTN, PUD w/ perforated ulcer, BPH, and left inguinal hernia who was referred to the [facility] with gastric mass, in setting of intentional wt loss of 40-50 lbs. CT A/P showed 2.0 x 1.5 cm exophytic mass within the gastric fundus concerning for underlying neoplasm. He underwent EGD 7/12/24 which showed submucosal mass measuring 18 mm x 16 mm, path shows GIST.    PLAN:  1. Gastric mass  2. Malignant gastrointestinal stromal tumor (GIST) of stomach (Multi)  -- Discussed path results w/ patient and his son.  -- Referral to medical oncology - appt scheduled w/ Dr Muhammad on 8/5.  -- Diag clinic team utilizing [employer] resources to assist pt w/ transportation to appt.    3. Screening for colorectal cancer  - No prior HM screening.  - Ordered colonoscopy Screening; High Risk Patient; Future  - Scheduled to establish w/ PCP on 8/9  - [employer] team to assist w/ scheduling/transportation.    4. Weight loss, unintentional  - in setting of esophagitis/gastritis  - continue PPI BID as ordered  - scheduled for follow-up w/ Gastroenterology 9/16  - reached out to dieticians to obtain Boost supplements  - continue to refrain from smoking/etoh use    All patient and family questions addressed.    SMITHA Rogers.[facility]         Virtual or Telephone Consent    An interactive audio and video telecommunication system which permits real time communications  between the patient (at the originating site) and provider (at the distant site) was utilized to provide this telehealth service.   Verbal consent was requested and obtained from Joe Alaniz on this date, 07/29/24 for a telehealth visit.

## 2024-07-29 NOTE — PROGRESS NOTES
NUTRITION Assessment NOTE    Nutrition Assessment     Reason for Visit:  Joe Alaniz is a 59 y.o. 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       Teeth are gone removed 2 weeks ago- Dental Clinic at [facility]      Lab Results   Component Value Date/Time    GLUCOSE 88 07/16/2024 0700     07/16/2024 0700    K 3.8 07/16/2024 0700     07/16/2024 0700    CO2 23 07/16/2024 0700    ANIONGAP 16 07/16/2024 0700    BUN 7 07/16/2024 0700    CREATININE 0.88 07/16/2024 0700    EGFR >90 07/16/2024 0700    CALCIUM 8.2 (L) 07/16/2024 0700    ALBUMIN 3.4 07/16/2024 0700    ALKPHOS 67 07/10/2024 1400    PROT 7.3 07/10/2024 1400    AST 28 07/10/2024 1400    BILITOT 1.4 (H) 07/10/2024 1400    ALT 28 07/10/2024 1400     Lab Results   Component Value Date/Time    VITD25 6 (L) 07/11/2024 0623       Anthropometrics:       HT:  167.7 cm  IBW: 64.5 kg  BMI: 15.82  69% of IBW    Would feel comfortable at 130#    Wt Readings from Last 10 Encounters:   07/19/24 (!) 44.5 kg (98 lb)   07/12/24 (!) 44.5 kg (98 lb)   07/04/24 59 kg (130 lb)   07/02/24 59 kg (130 lb)   05/06/24 60.8 kg (134 lb)   10/11/18 56.9 kg (125 lb 7.1 oz)        Food And Nutrient Intake:           He was seen by in-patient RDN on 7/11/2024  She notes N/V and diarrhea   Per in-patient note- poor dentition- needs multiple teeth extractions and this is causing issues with eating   Noted discharged on-   MVI, folic acid, Vitamin D, Vitamin B12, FeSO4  Was given Boost VHC while in-house per her note      Appetite / intake  has gotten better since teeth extraction   Currently no issues with N/V/D    Intake:   B-   cream of wheat  Milk  Toast  Banana    L  Tuna pack - ate  half and toast  OJ    Dinner:  Deepa entree- turkey tetrazine ate all    He reports he tolerated supplements in-house  He reports he received multiple flavors of supplements and they came in bottles- so this could not be the Boost VHC- as this comes in a juice box and only in vanilla-    He requested 8 per day  That volume is not indicated    Based on weight - diagnosis and current po intake feel 4 per day of Ensure plus would be appropriate- providing 1400 calories and 52 g protein                                                           Nutrition Focused Physical Exam Findings:  defer: phone                        Energy Needs     Dosing weight: 44.5 kg  Calories per day: 1335 to 1560  determined by 30-35 kcal/kg  Protein (g) per day: 53.4 determined by 1.2 g/kg  Estimated fluid needs: 1355 to 1500 determined by 1 kcal/mL       Nutrition Diagnosis   Malnutrition Diagnosis  Patient has Malnutrition Diagnosis: Yes  Diagnosis Status: Ongoing  Malnutrition Diagnosis: Severe malnutrition related to chronic disease or condition  As Evidenced by: pt with a decrease in PO intake for the last two months with at least a 27% weight loss (possibly more) with noted severe fat and muscle loss on physical exam         Nutrition Interventions/Recommendations   Nutrition Prescription   Continue RUSSELL    Food and Nutrition Delivery       Nutrition Education   CMN completed will send to [facility]    Coordination of Care   Diganostic clenic team  Med onc- will see on 8/5/2024 with Dr. Muhammad   [facility]       There are no Patient Instructions on file for this visit.    Nutrition Monitoring and Evaluation      Po intake  Tolerance to diet  Weight  Labs         Time Spent  Prep time on day of patient encounter: 15 minutes  Time spent directly with patient, family or caregiver: 20 minutes  Additional Time Spent on Patient Care Activities: 10 minutes  Documentation Time: 15 minutes  Other Time Spent: 5 minutes  Total: 65  minutes

## 2024-07-30 ENCOUNTER — SOCIAL WORK (OUTPATIENT)
Dept: CASE MANAGEMENT | Facility: HOSPITAL | Age: 59
End: 2024-07-30
Payer: COMMERCIAL

## 2024-07-30 NOTE — PROGRESS NOTES
This SW was asked to contact pt to be sure he has a way to get here for next week's med onc appt. SW called pt who said a friend is bringing him to first appt to learn plan. He is also familiar with his health insurance transportation and has used it before.  No other needs identified at this time.

## 2024-07-30 NOTE — DOCUMENTATION CLARIFICATION NOTE
"    PATIENT:               REGINE FARRIS  ACCT #:                  [id number]  MRN:                       [id number]  :                       1965  ADMIT DATE:       7/10/2024 1:06 PM  DISCH DATE:        2024 12:33 PM  RESPONDING PROVIDER #:        [id number]          PROVIDER RESPONSE TEXT:    I concur with the pathology report findings and they are clinically significant    CDI QUERY TEXT:    Clarification        Instruction:    Based on your assessment of the patient and the clinical information, please provide the requested documentation by clicking on the appropriate radio button and enter any additional information if prompted.    Question: Please document whether you concur or do not concur with the pathology report findings    When answering this query, please exercise your independent professional judgment. The fact that a question is being asked, does not imply that any particular answer is desired or expected.    The patient's clinical indicators include:  Clinical Information:  58 y.o. male with a PMHx of HTN, BPH, surgery for PUD many years ago presented with nausea and vomiting (3 or more episodes of vomiting daily and 50lb weight loss since May).  He states that vomit was dark colored today and has seen some blood in on other days.  He states that he had not had BM in 1 week until today when he had a runny stool.  Appetite at home has been poor, some days not able to keep any food down.   Concern for GIST tumor on CT abd vs other types of tumors.    Clinical Indicators and Pathology Findings:  24 EGD: \" Stomach fundus biopsy, GIST FNB \"  \" Final Diagnosis-- Gastrointestinal stromal tumor (GIST).  See note: ...\" Overall, the morphologic and immunohistochemical staining profile support the above diagnosis \"    Treatment: 1L LR x1, - D5 % and 0.9 % sodium chloride infusion 50ml/hr, treat hypokalemia hypocalcemia hypochloremia and metabolic alkalosis, consistent with his poor p.o. " intake and increased GI losses, PPI  Risk Factors:  hx PUD, male over 50  Options provided:  -- I concur with the pathology report findings and they are clinically significant  -- I do not concur with the pathology report findings  -- Other - I will add my own diagnosis  -- Refer to Clinical Documentation Reviewer    Query created by: Luz Elena Roth on 7/28/2024 10:07 AM      Electronically signed by:  KASSANDRA JACKSON DO 7/30/2024 3:06 PM

## 2024-07-31 ENCOUNTER — NUTRITION (OUTPATIENT)
Dept: HEMATOLOGY/ONCOLOGY | Facility: HOSPITAL | Age: 59
End: 2024-07-31
Payer: COMMERCIAL

## 2024-07-31 NOTE — PROGRESS NOTES
NUTRITION COMMUNICATION NOTE    Joe Alaniz     REASON FOR COMMUNICATION:     [facility] to [facility]  I received a VM from them today  They are no longer accepting oral nutrition referrals  Will now submit CMN to [facility]  Please remember we are requesting Ensure Plus- 4 per day- 1400 calories    Wt Readings from Last 10 Encounters:   07/19/24 (!) 44.5 kg (98 lb)   07/12/24 (!) 44.5 kg (98 lb)   07/04/24 59 kg (130 lb)   07/02/24 59 kg (130 lb)   05/06/24 60.8 kg (134 lb)   10/11/18 56.9 kg (125 lb 7.1 oz)   {          Time Spent  Prep time on day of patient encounter: 5 minutes  Time spent directly with patient, family or caregiver: 0 minutes  Additional Time Spent on Patient Care Activities: 10 minutes  Documentation Time: 5 minutes  Other Time Spent: 0 minutes  Total: 20 minutes

## 2024-08-05 ENCOUNTER — OFFICE VISIT (OUTPATIENT)
Dept: HEMATOLOGY/ONCOLOGY | Facility: HOSPITAL | Age: 59
End: 2024-08-05
Payer: COMMERCIAL

## 2024-08-05 ENCOUNTER — NUTRITION (OUTPATIENT)
Dept: HEMATOLOGY/ONCOLOGY | Facility: HOSPITAL | Age: 59
End: 2024-08-05
Payer: COMMERCIAL

## 2024-08-05 VITALS
TEMPERATURE: 97.9 F | RESPIRATION RATE: 19 BRPM | HEART RATE: 109 BPM | DIASTOLIC BLOOD PRESSURE: 88 MMHG | BODY MASS INDEX: 15.93 KG/M2 | WEIGHT: 98.7 LBS | OXYGEN SATURATION: 100 % | SYSTOLIC BLOOD PRESSURE: 117 MMHG

## 2024-08-05 DIAGNOSIS — I10 HYPERTENSION, UNSPECIFIED TYPE: ICD-10-CM

## 2024-08-05 DIAGNOSIS — C49.A2 MALIGNANT GASTROINTESTINAL STROMAL TUMOR (GIST) OF STOMACH (MULTI): Primary | ICD-10-CM

## 2024-08-05 PROCEDURE — 3074F SYST BP LT 130 MM HG: CPT | Performed by: STUDENT IN AN ORGANIZED HEALTH CARE EDUCATION/TRAINING PROGRAM

## 2024-08-05 PROCEDURE — 99205 OFFICE O/P NEW HI 60 MIN: CPT | Performed by: STUDENT IN AN ORGANIZED HEALTH CARE EDUCATION/TRAINING PROGRAM

## 2024-08-05 PROCEDURE — 99215 OFFICE O/P EST HI 40 MIN: CPT | Performed by: STUDENT IN AN ORGANIZED HEALTH CARE EDUCATION/TRAINING PROGRAM

## 2024-08-05 PROCEDURE — 3079F DIAST BP 80-89 MM HG: CPT | Performed by: STUDENT IN AN ORGANIZED HEALTH CARE EDUCATION/TRAINING PROGRAM

## 2024-08-05 NOTE — PROGRESS NOTES
NUTRITION COMMUNICATION NOTE    Joe Alaniz     REASON FOR COMMUNICATION:     [facility] was [facility] to [facility]  I received a VM from them today  They are no longer accepting oral nutrition referrals  Will now submit CMN to [facility]  Please remember we are requesting Ensure Plus- 4 per day- 1400 calories    Wt Readings from Last 10 Encounters:   08/05/24 (!) 44.8 kg (98 lb 11.2 oz)   07/19/24 (!) 44.5 kg (98 lb)   07/12/24 (!) 44.5 kg (98 lb)   07/04/24 59 kg (130 lb)   07/02/24 59 kg (130 lb)   05/06/24 60.8 kg (134 lb)   10/11/18 56.9 kg (125 lb 7.1 oz)   {

## 2024-08-05 NOTE — PROGRESS NOTES
Patient ID: Joe Alaniz is a 59 y.o. male.  Referring Physician: No referring provider defined for this encounter.  Primary Care Provider: No Assigned PCP Generic Provider, MD  Surgeon: TBEPIFANIO  Visit Type: Initial Visit    Cancer History:  DIAGNOSIS: Gastric GIST    STAGING: TBD    CURRENT SITES OF DISEASE: Stomach    MOLECULAR/GENOMIC:    TUMOR MARKER:       CURRENT TREATMENT:       PRIOR TREATMENT:       HISTORY:   7/10/24: Presented to the ED with syncopal event, nausea vomiting, diarrhea and unintentional weight loss.  CT showed 2 cm x 1.5 cm exophytic mass in the posterior wall of the gastric fundus, concerning for neoplasm.  7/12/24: EGD showed grade D esophagitis and gross gastritis, also found to homogeneous hypoechoic rounded submucosal mass measuring 18 mm x 16 mm in the fundus of the stomach.  Biopsies were taken.  Pathology of gastric mass biopsy showed GI stromal tumor.  No mitotic figures or necrosis are appreciated on the limited biopsy specimen.    PMH: BPH, HTN, peptic ulcer disease. Reports bleeding perforated ulcer in his stomach when he was 11 years old and required a surgery, inguinal hernia    FH:  Father: liver cancer  Paternal grandmother: stomach cancer  Paternal uncle x2: unknown type of cancer  Paternal cousin: lung cancer    SH: +tobacco use, quit smoking approx 3 weeks ago, +ETOH, no illicits    Subjective:   Continues to have some abdominal discomfort. Also reports burning pain in his feet.     No fevers, chills, chest pain, shortness of breath, abdominal pain, nausea, vomiting, diarrhea, or rashes.    ROS as above. Remainder of 10-point review of systems elicited and negative.    Objective:  /88 (BP Location: Left arm, Patient Position: Sitting, BP Cuff Size: Small adult)   Pulse 109   Temp 36.6 °C (97.9 °F) (Temporal)   Resp 19   Wt (!) 44.8 kg (98 lb 11.2 oz)   SpO2 100%   BMI 15.93 kg/m²     PE:  Gen: A&O, NAD  Head: Normocephalic, atraumatic  Eyes: no scleral  icterus  ENT: mucous membranes moist, no oropharyngeal lesions  Resp: Lungs CTAB  Cardiac: Normal rate, regular rhythm, no murmurs appreciated  Abdomen: Soft, nondistended, nontender, +BS  Neuro: CNII-XII grossly intact  Psych: appropriate mood & affect  Skin: warm, dry, no apparent rashes     CT Chest angio for PE 7/10/24:  1. No evidence of acute pulmonary embolism.  2. Mild upper lung predominant emphysema.  3. 4 mm left upper lobe nodule as described above.  Instructions:  No further follow-up is required, however, if the  patient has high risk factors for primary lung malignancy, follow-up  noncontrast CT scan chest in 12 months may be obtained. (Manuel Pope et al., Guidelines for management of incidental pulmonary  nodules detected on CT images: From the Fleischner Society 2017,  Radiology. 2017 Jul;284 (1):228-243.) [email or web address]  4. Patulous esophagus with debris and ingested material. Correlate  with motility disorder and reflux.  5. Partially imaged fat density lesion in the left flank, likely a  lipoma.    CT Abd/pelvis 7/10/24:  1.  A 2.0 x 1.5 cm exophytic mass within the posterior wall of the  gastric fundus, concerning for underlying neoplasm, possibly  gastrointestinal stromal tumor (GIST). Recommend further evaluation  via endoscopy.  2. Redemonstration of circumferential mural thickening of the distal  esophagus with patulous esophagus, concerning for esophagitis.  3. Extensive sigmoid colonic diverticulosis without evidence of acute  diverticulitis.  4. Interval decrease in size of small bowel containing left inguinal  hernia compared to 07/02/2024 CT. No evidence bowel wall thickening  or obstruction.  5. Additional chronic findings as described above    Assessment & Plan:   Joe Alaniz is a 59 y.o. male with localized GIST of the gastric fundus diagnosed 7/12/2024.     I personally reviewed the images of his recent CT scan, which showed no evidence of metastatic disease, but do show  approximately 2 cm mass in the gastric fundus.    I discussed with him that because there is no clear evidence of metastatic disease, I recommend upfront surgical resection.  Based on the findings on pathology, we may discuss adjuvant therapy with imatinib.    I will reach out to my surgical oncology team to facilitate a consultation.  He would be very interested in having combined surgery for this with the surgeon planning to do his inguinal hernia repair.  I discussed that I was not sure if this would be logistically feasible, but I would suggest that to the surgical team when I reach out to them.    He is scheduled to see a new PCP later this week, and I will defer management of his other medical problems.    Plan:  GIST  - Referral to surgical oncology  - RTC approx 6 weeks to review pathology    Cancer related pain  - Short course of tramadol rx    HTN  - short course of antihypertensives sent to his pharmacy to bridge until he sees his PCP later in the week

## 2024-08-05 NOTE — PROGRESS NOTES
NUTRITION Follow-Up NOTE    Nutrition Assessment     Reason for Visit:  Joe Alaniz is a 59 y.o. male who presents for request for ONS    I am meeting him here in clinic today  Last I spoke with him he felt po intake improved with teeth extraction-   Weight is fairly stable- potentially slightly increased    Business card provided today  Provided him with phone contact for [employer] as well as samples for Ensure Plus  He knows he is ordered 4 per day     We discussed calorie needs   _______________________________________      Pt was in-pt 7-10 to 7-  Discharge diagnosis is FTT and gastric mass pending biopsy  7/12 EGD pt with grade D esophagitis and gastritis    Pt seen in diagnostic clinic on 7/24/2024  Noted left inguinal hernia   Noted he has quit both drinking and smoking since discharge   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       Teeth are gone removed 2 weeks ago- Dental Clinic at [facility]      Lab Results   Component Value Date/Time    GLUCOSE 88 07/16/2024 0700     07/16/2024 0700    K 3.8 07/16/2024 0700     07/16/2024 0700    CO2 23 07/16/2024 0700    ANIONGAP 16 07/16/2024 0700    BUN 7 07/16/2024 0700    CREATININE 0.88 07/16/2024 0700    EGFR >90 07/16/2024 0700    CALCIUM 8.2 (L) 07/16/2024 0700    ALBUMIN 3.4 07/16/2024 0700    ALKPHOS 67 07/10/2024 1400    PROT 7.3 07/10/2024 1400    AST 28 07/10/2024 1400    BILITOT 1.4 (H) 07/10/2024 1400    ALT 28 07/10/2024 1400     Lab Results   Component Value Date/Time    VITD25 6 (L) 07/11/2024 0623       Anthropometrics:  Anthropometrics  Weight: (!) 44.8 kg (98 lb 11.2 oz)    HT:  167.7 cm  IBW: 64.5 kg  BMI: 15.92  69% of IBW    Would feel comfortable at 130#    Wt Readings from Last 10 Encounters:   08/05/24 (!) 44.8 kg (98 lb 11.2 oz)   07/19/24 (!) 44.5 kg (98 lb)   07/12/24 (!) 44.5 kg (98 lb)   07/04/24 59 kg (130 lb)   07/02/24 59 kg (130 lb)   05/06/24 60.8 kg (134 lb)   10/11/18 56.9 kg (125 lb 7.1 oz)        Food And Nutrient Intake:            He was seen by in-patient RDN on 7/11/2024  She notes N/V and diarrhea   Per in-patient note- poor dentition- needs multiple teeth extractions and this is causing issues with eating   Noted discharged on-   MVI, folic acid, Vitamin D, Vitamin B12, FeSO4  Was given Boost VHC while in-house per her note      Appetite / intake  has gotten better since teeth extraction   Currently no issues with N/V/D    Intake:   B-   cream of wheat  Milk  Toast  Banana    L  Tuna pack - ate half and toast  OJ    Dinner:  Deepa entree- turkey tetrazine ate all    He reports he tolerated supplements in-house  He reports he received multiple flavors of supplements and they came in bottles- so this could not be the Boost VHC- as this comes in a juice box and only in vanilla-    He requested 8 per day  That volume is not indicated    Based on weight - diagnosis and current po intake feel 4 per day of Ensure plus would be appropriate- providing 1400 calories and 52 g protein                                                           Nutrition Focused Physical Exam Findings:  defer: phone                        Energy Needs  Calculated Energy Needs Using Equations  Temp: 36.6 °C (97.9 °F)  Dosing weight: 44.5 kg  Calories per day: 1335 to 1560  determined by 30-35 kcal/kg  Protein (g) per day: 53.4 determined by 1.2 g/kg  Estimated fluid needs: 1355 to 1500 determined by 1 kcal/mL       Nutrition Diagnosis             Nutrition Interventions/Recommendations   Nutrition Prescription   Continue RUSSELL    Food and Nutrition Delivery       Nutrition Education   CMN completed will send to [facility]    Coordination of Care   Diganosttylor turner team  Med onc- will see on 8/5/2024 with Dr. Muhammad   [facility]       There are no Patient Instructions on file for this visit.    Nutrition Monitoring and Evaluation      Po intake  Tolerance to diet  Weight  Labs

## 2024-08-05 NOTE — PROGRESS NOTES
NUTRITION Follow-Up NOTE    Nutrition Assessment     Reason for Visit:  Joe Alaniz is a 59 y.o. 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 Muhammad today  He is with his son today    I am told the plan is for surgery  Then adjuvant treatment as needed    Business card provided  Discussed Ensure Plus 4 per day to go through [employer]  Pt has their contact number        Lab Results   Component Value Date/Time    GLUCOSE 88 07/16/2024 0700     07/16/2024 0700    K 3.8 07/16/2024 0700     07/16/2024 0700    CO2 23 07/16/2024 0700    ANIONGAP 16 07/16/2024 0700    BUN 7 07/16/2024 0700    CREATININE 0.88 07/16/2024 0700    EGFR >90 07/16/2024 0700    CALCIUM 8.2 (L) 07/16/2024 0700    ALBUMIN 3.4 07/16/2024 0700    ALKPHOS 67 07/10/2024 1400    PROT 7.3 07/10/2024 1400    AST 28 07/10/2024 1400    BILITOT 1.4 (H) 07/10/2024 1400    ALT 28 07/10/2024 1400     Lab Results   Component Value Date/Time    VITD25 6 (L) 07/11/2024 0623       Anthropometrics:       HT:  167.7 cm  IBW: 64.5 kg  BMI: 15.82  69% of IBW    Would feel comfortable at 130#    Wt Readings from Last 10 Encounters:   08/05/24 (!) 44.8 kg (98 lb 11.2 oz)   07/19/24 (!) 44.5 kg (98 lb)   07/12/24 (!) 44.5 kg (98 lb)   07/04/24 59 kg (130 lb)   07/02/24 59 kg (130 lb)   05/06/24 60.8 kg (134 lb)   10/11/18 56.9 kg (125 lb 7.1 oz)        Food And Nutrient Intake:           Pt continues to report intake continues to be improved since teeth extraction  Weight has potentially increased 1# in 2 weeks  He continues to report eating 3 times per day and snacks     Will  continue to follow for supplements       Appetite / intake  has gotten better since teeth extraction   Currently no issues with N/V/D      Based on weight - diagnosis and current po intake feel 4 per day of Ensure plus would be appropriate- providing 1400 calories and 52 g protein                                                           Nutrition Focused Physical Exam Findings:  Deferred- time  Pt with hat and coat on                         Energy Needs     Dosing weight: 44.5 kg  Calories per day: 1335 to 1560  determined by 30-35 kcal/kg  Protein (g) per day: 53.4 determined by 1.2 g/kg  Estimated fluid needs: 1355 to 1500 determined by 1 kcal/mL       Nutrition Diagnosis   Malnutrition Diagnosis  Patient has Malnutrition Diagnosis: Yes  Diagnosis Status: Ongoing  Malnutrition Diagnosis: Severe malnutrition related to chronic disease or condition  As Evidenced by: pt with a decrease in PO intake for the last two months with at least a 27% weight loss (possibly more) with noted severe fat and muscle loss on physical exam         Nutrition Interventions/Recommendations   Nutrition Prescription   Continue RUSSELL    Food and Nutrition Delivery       Nutrition Education   CMN completed will send to [employer] card provided     Coordination of Care     Med onc-   [facility]       There are no Patient Instructions on file for this visit.    Nutrition Monitoring and Evaluation      Po intake  Tolerance to diet  Weight  Labs

## 2024-08-07 PROCEDURE — RXMED WILLOW AMBULATORY MEDICATION CHARGE

## 2024-08-07 RX ORDER — HYDROCHLOROTHIAZIDE 25 MG/1
12.5 TABLET ORAL DAILY
Qty: 15 TABLET | Refills: 0 | Status: SHIPPED | OUTPATIENT
Start: 2024-08-07 | End: 2024-09-06

## 2024-08-07 RX ORDER — TRAMADOL HYDROCHLORIDE 50 MG/1
50 TABLET ORAL EVERY 6 HOURS PRN
Qty: 20 TABLET | Refills: 0 | Status: SHIPPED | OUTPATIENT
Start: 2024-08-07

## 2024-08-07 RX ORDER — AMLODIPINE BESYLATE 10 MG/1
10 TABLET ORAL DAILY
Qty: 30 TABLET | Refills: 0 | Status: SHIPPED | OUTPATIENT
Start: 2024-08-07 | End: 2024-09-06

## 2024-08-08 ENCOUNTER — PHARMACY VISIT (OUTPATIENT)
Dept: PHARMACY | Facility: CLINIC | Age: 59
End: 2024-08-08
Payer: MEDICAID

## 2024-08-09 ENCOUNTER — APPOINTMENT (OUTPATIENT)
Dept: PRIMARY CARE | Facility: CLINIC | Age: 59
End: 2024-08-09
Payer: COMMERCIAL

## 2024-08-13 ENCOUNTER — ANESTHESIA EVENT (OUTPATIENT)
Dept: OPERATING ROOM | Facility: HOSPITAL | Age: 59
End: 2024-08-13
Payer: COMMERCIAL

## 2024-08-14 ENCOUNTER — HOSPITAL ENCOUNTER (OUTPATIENT)
Facility: HOSPITAL | Age: 59
Setting detail: OUTPATIENT SURGERY
Discharge: HOME | End: 2024-08-14
Attending: STUDENT IN AN ORGANIZED HEALTH CARE EDUCATION/TRAINING PROGRAM | Admitting: STUDENT IN AN ORGANIZED HEALTH CARE EDUCATION/TRAINING PROGRAM
Payer: COMMERCIAL

## 2024-08-14 ENCOUNTER — ANESTHESIA (OUTPATIENT)
Dept: OPERATING ROOM | Facility: HOSPITAL | Age: 59
End: 2024-08-14
Payer: COMMERCIAL

## 2024-08-14 VITALS
WEIGHT: 105.82 LBS | BODY MASS INDEX: 17.01 KG/M2 | SYSTOLIC BLOOD PRESSURE: 161 MMHG | TEMPERATURE: 97.5 F | HEIGHT: 66 IN | DIASTOLIC BLOOD PRESSURE: 104 MMHG | OXYGEN SATURATION: 96 % | RESPIRATION RATE: 16 BRPM | HEART RATE: 81 BPM

## 2024-08-14 DIAGNOSIS — K40.90 LEFT INGUINAL HERNIA: Primary | ICD-10-CM

## 2024-08-14 PROCEDURE — 3700000002 HC GENERAL ANESTHESIA TIME - EACH INCREMENTAL 1 MINUTE: Performed by: STUDENT IN AN ORGANIZED HEALTH CARE EDUCATION/TRAINING PROGRAM

## 2024-08-14 PROCEDURE — 2500000005 HC RX 250 GENERAL PHARMACY W/O HCPCS: Performed by: ANESTHESIOLOGY

## 2024-08-14 PROCEDURE — 3600000008 HC OR TIME - EACH INCREMENTAL 1 MINUTE - PROCEDURE LEVEL THREE: Performed by: STUDENT IN AN ORGANIZED HEALTH CARE EDUCATION/TRAINING PROGRAM

## 2024-08-14 PROCEDURE — 3700000001 HC GENERAL ANESTHESIA TIME - INITIAL BASE CHARGE: Performed by: STUDENT IN AN ORGANIZED HEALTH CARE EDUCATION/TRAINING PROGRAM

## 2024-08-14 PROCEDURE — 2500000005 HC RX 250 GENERAL PHARMACY W/O HCPCS: Performed by: NURSE ANESTHETIST, CERTIFIED REGISTERED

## 2024-08-14 PROCEDURE — 2500000004 HC RX 250 GENERAL PHARMACY W/ HCPCS (ALT 636 FOR OP/ED): Performed by: NURSE ANESTHETIST, CERTIFIED REGISTERED

## 2024-08-14 PROCEDURE — 2500000004 HC RX 250 GENERAL PHARMACY W/ HCPCS (ALT 636 FOR OP/ED): Performed by: STUDENT IN AN ORGANIZED HEALTH CARE EDUCATION/TRAINING PROGRAM

## 2024-08-14 PROCEDURE — C1781 MESH (IMPLANTABLE): HCPCS | Performed by: STUDENT IN AN ORGANIZED HEALTH CARE EDUCATION/TRAINING PROGRAM

## 2024-08-14 PROCEDURE — 2720000007 HC OR 272 NO HCPCS: Performed by: STUDENT IN AN ORGANIZED HEALTH CARE EDUCATION/TRAINING PROGRAM

## 2024-08-14 PROCEDURE — 49505 PRP I/HERN INIT REDUC >5 YR: CPT | Performed by: STUDENT IN AN ORGANIZED HEALTH CARE EDUCATION/TRAINING PROGRAM

## 2024-08-14 PROCEDURE — 2780000003 HC OR 278 NO HCPCS: Performed by: STUDENT IN AN ORGANIZED HEALTH CARE EDUCATION/TRAINING PROGRAM

## 2024-08-14 PROCEDURE — 7100000009 HC PHASE TWO TIME - INITIAL BASE CHARGE: Performed by: STUDENT IN AN ORGANIZED HEALTH CARE EDUCATION/TRAINING PROGRAM

## 2024-08-14 PROCEDURE — 2500000004 HC RX 250 GENERAL PHARMACY W/ HCPCS (ALT 636 FOR OP/ED): Performed by: ANESTHESIOLOGY

## 2024-08-14 PROCEDURE — 3600000003 HC OR TIME - INITIAL BASE CHARGE - PROCEDURE LEVEL THREE: Performed by: STUDENT IN AN ORGANIZED HEALTH CARE EDUCATION/TRAINING PROGRAM

## 2024-08-14 PROCEDURE — 2500000001 HC RX 250 WO HCPCS SELF ADMINISTERED DRUGS (ALT 637 FOR MEDICARE OP): Performed by: ANESTHESIOLOGY

## 2024-08-14 PROCEDURE — 7100000001 HC RECOVERY ROOM TIME - INITIAL BASE CHARGE: Performed by: STUDENT IN AN ORGANIZED HEALTH CARE EDUCATION/TRAINING PROGRAM

## 2024-08-14 PROCEDURE — 7100000002 HC RECOVERY ROOM TIME - EACH INCREMENTAL 1 MINUTE: Performed by: STUDENT IN AN ORGANIZED HEALTH CARE EDUCATION/TRAINING PROGRAM

## 2024-08-14 PROCEDURE — 7100000010 HC PHASE TWO TIME - EACH INCREMENTAL 1 MINUTE: Performed by: STUDENT IN AN ORGANIZED HEALTH CARE EDUCATION/TRAINING PROGRAM

## 2024-08-14 DEVICE — PATCH, MESH, MARLEX, 3 X 6 IN, POLYPROPYLENE: Type: IMPLANTABLE DEVICE | Site: ABDOMEN | Status: FUNCTIONAL

## 2024-08-14 RX ORDER — PROMETHAZINE HYDROCHLORIDE 25 MG/ML
6.25 INJECTION, SOLUTION INTRAMUSCULAR; INTRAVENOUS ONCE AS NEEDED
Status: DISCONTINUED | OUTPATIENT
Start: 2024-08-14 | End: 2024-08-14 | Stop reason: HOSPADM

## 2024-08-14 RX ORDER — OXYCODONE HYDROCHLORIDE 5 MG/1
10 TABLET ORAL EVERY 4 HOURS PRN
Status: DISCONTINUED | OUTPATIENT
Start: 2024-08-14 | End: 2024-08-14 | Stop reason: HOSPADM

## 2024-08-14 RX ORDER — SODIUM CHLORIDE, SODIUM LACTATE, POTASSIUM CHLORIDE, CALCIUM CHLORIDE 600; 310; 30; 20 MG/100ML; MG/100ML; MG/100ML; MG/100ML
100 INJECTION, SOLUTION INTRAVENOUS CONTINUOUS
Status: DISCONTINUED | OUTPATIENT
Start: 2024-08-14 | End: 2024-08-14 | Stop reason: HOSPADM

## 2024-08-14 RX ORDER — PROPOFOL 10 MG/ML
INJECTION, EMULSION INTRAVENOUS AS NEEDED
Status: DISCONTINUED | OUTPATIENT
Start: 2024-08-14 | End: 2024-08-14

## 2024-08-14 RX ORDER — LIDOCAINE HYDROCHLORIDE 20 MG/ML
INJECTION, SOLUTION EPIDURAL; INFILTRATION; INTRACAUDAL; PERINEURAL AS NEEDED
Status: DISCONTINUED | OUTPATIENT
Start: 2024-08-14 | End: 2024-08-14

## 2024-08-14 RX ORDER — METOPROLOL TARTRATE 1 MG/ML
INJECTION, SOLUTION INTRAVENOUS AS NEEDED
Status: DISCONTINUED | OUTPATIENT
Start: 2024-08-14 | End: 2024-08-14

## 2024-08-14 RX ORDER — LIDOCAINE HYDROCHLORIDE 10 MG/ML
0.1 INJECTION, SOLUTION EPIDURAL; INFILTRATION; INTRACAUDAL; PERINEURAL ONCE
Status: DISCONTINUED | OUTPATIENT
Start: 2024-08-14 | End: 2024-08-14 | Stop reason: HOSPADM

## 2024-08-14 RX ORDER — ROCURONIUM BROMIDE 10 MG/ML
INJECTION, SOLUTION INTRAVENOUS AS NEEDED
Status: DISCONTINUED | OUTPATIENT
Start: 2024-08-14 | End: 2024-08-14

## 2024-08-14 RX ORDER — BUPIVACAINE HYDROCHLORIDE 5 MG/ML
INJECTION, SOLUTION PERINEURAL AS NEEDED
Status: DISCONTINUED | OUTPATIENT
Start: 2024-08-14 | End: 2024-08-14 | Stop reason: HOSPADM

## 2024-08-14 RX ORDER — ONDANSETRON HYDROCHLORIDE 2 MG/ML
4 INJECTION, SOLUTION INTRAVENOUS ONCE AS NEEDED
Status: DISCONTINUED | OUTPATIENT
Start: 2024-08-14 | End: 2024-08-14 | Stop reason: HOSPADM

## 2024-08-14 RX ORDER — PHENYLEPHRINE HCL IN 0.9% NACL 1 MG/10 ML
SYRINGE (ML) INTRAVENOUS AS NEEDED
Status: DISCONTINUED | OUTPATIENT
Start: 2024-08-14 | End: 2024-08-14

## 2024-08-14 RX ORDER — FENTANYL CITRATE 50 UG/ML
INJECTION, SOLUTION INTRAMUSCULAR; INTRAVENOUS AS NEEDED
Status: DISCONTINUED | OUTPATIENT
Start: 2024-08-14 | End: 2024-08-14

## 2024-08-14 RX ORDER — SODIUM CHLORIDE, SODIUM LACTATE, POTASSIUM CHLORIDE, CALCIUM CHLORIDE 600; 310; 30; 20 MG/100ML; MG/100ML; MG/100ML; MG/100ML
INJECTION, SOLUTION INTRAVENOUS CONTINUOUS PRN
Status: DISCONTINUED | OUTPATIENT
Start: 2024-08-14 | End: 2024-08-14

## 2024-08-14 RX ORDER — ONDANSETRON HYDROCHLORIDE 2 MG/ML
INJECTION, SOLUTION INTRAVENOUS AS NEEDED
Status: DISCONTINUED | OUTPATIENT
Start: 2024-08-14 | End: 2024-08-14

## 2024-08-14 RX ORDER — OXYCODONE HYDROCHLORIDE 5 MG/1
5 TABLET ORAL EVERY 4 HOURS PRN
Status: DISCONTINUED | OUTPATIENT
Start: 2024-08-14 | End: 2024-08-14 | Stop reason: HOSPADM

## 2024-08-14 RX ORDER — MIDAZOLAM HYDROCHLORIDE 1 MG/ML
INJECTION INTRAMUSCULAR; INTRAVENOUS AS NEEDED
Status: DISCONTINUED | OUTPATIENT
Start: 2024-08-14 | End: 2024-08-14

## 2024-08-14 RX ORDER — CEFAZOLIN 1 G/1
INJECTION, POWDER, FOR SOLUTION INTRAVENOUS AS NEEDED
Status: DISCONTINUED | OUTPATIENT
Start: 2024-08-14 | End: 2024-08-14

## 2024-08-14 SDOH — HEALTH STABILITY: MENTAL HEALTH: CURRENT SMOKER: 0

## 2024-08-14 ASSESSMENT — PAIN - FUNCTIONAL ASSESSMENT
PAIN_FUNCTIONAL_ASSESSMENT: 0-10
PAIN_FUNCTIONAL_ASSESSMENT: UNABLE TO SELF-REPORT
PAIN_FUNCTIONAL_ASSESSMENT: 0-10
PAIN_FUNCTIONAL_ASSESSMENT: 0-10
PAIN_FUNCTIONAL_ASSESSMENT: UNABLE TO SELF-REPORT
PAIN_FUNCTIONAL_ASSESSMENT: 0-10
PAIN_FUNCTIONAL_ASSESSMENT: 0-10

## 2024-08-14 ASSESSMENT — PAIN SCALES - GENERAL
PAINLEVEL_OUTOF10: 8
PAINLEVEL_OUTOF10: 7
PAINLEVEL_OUTOF10: 0 - NO PAIN
PAINLEVEL_OUTOF10: 10 - WORST POSSIBLE PAIN
PAINLEVEL_OUTOF10: 4
PAINLEVEL_OUTOF10: 9
PAIN_LEVEL: 4
PAINLEVEL_OUTOF10: 0 - NO PAIN

## 2024-08-14 ASSESSMENT — PAIN DESCRIPTION - ORIENTATION
ORIENTATION: LEFT
ORIENTATION: LEFT

## 2024-08-14 ASSESSMENT — COLUMBIA-SUICIDE SEVERITY RATING SCALE - C-SSRS
2. HAVE YOU ACTUALLY HAD ANY THOUGHTS OF KILLING YOURSELF?: NO
6. HAVE YOU EVER DONE ANYTHING, STARTED TO DO ANYTHING, OR PREPARED TO DO ANYTHING TO END YOUR LIFE?: NO
1. IN THE PAST MONTH, HAVE YOU WISHED YOU WERE DEAD OR WISHED YOU COULD GO TO SLEEP AND NOT WAKE UP?: NO

## 2024-08-14 ASSESSMENT — PAIN DESCRIPTION - LOCATION
LOCATION: GROIN
LOCATION: GROIN

## 2024-08-14 NOTE — PERIOPERATIVE NURSING NOTE
1600 - assumed care of patient at this time. Report received from outgoing RN.     1615 - patient successfully voided urine output of 150 ml at this time. Urine yellow and clear.    1620 - patient dressed with assistance by RN at this time. PIV cath removed, site WDL.     1631 - discharge instructions reviewed with patient and patient son at this time. All questions answered by RN at this time. Instructed to contact Dr. Rangel office if there are any questions or concerns.     1637 - patient discharged by wheelchair to [facility] in stable condition. All belongings sent with patient.

## 2024-08-14 NOTE — ANESTHESIA PROCEDURE NOTES
Airway  Date/Time: 8/14/2024 1:31 PM  Urgency: elective    Airway not difficult    Staffing  Performed: CRNA   Authorized by: Eleno Pacheco MD    Performed by: SMITHA Arevalo-NATHANIEL  Patient location during procedure: OR    Indications and Patient Condition  Indications for airway management: anesthesia and airway protection  Spontaneous ventilation: present  Sedation level: deep (ASLEEP)  Preoxygenated: yes  Patient position: sniffing  Mask difficulty assessment: 1 - vent by mask    Final Airway Details  Final airway type: endotracheal airway      Successful airway: ETT  Cuffed: yes   Successful intubation technique: direct laryngoscopy  Facilitating devices/methods: intubating stylet and cricoid pressure  Endotracheal tube insertion site: oral  Blade: Luiza  Blade size: #3  ETT size (mm): 7.0  Cormack-Lehane Classification: grade I - full view of glottis  Placement verified by: chest auscultation and capnometry   Cuff volume (mL): 5  Measured from: lips  ETT to lips (cm): 20  Number of attempts at approach: 1

## 2024-08-14 NOTE — ANESTHESIA POSTPROCEDURE EVALUATION
Patient: Joe Alaniz    Procedure Summary       Date: 08/14/24 Room / Location: U A OR 09 / Virtual [facility] OR    Anesthesia Start: 1316 Anesthesia Stop: 1500    Procedure: Left Inguinal Hernia Repair (Left) Diagnosis:       Left inguinal hernia      (Left inguinal hernia [K40.90])    Surgeons: Varun Rangel MD Responsible Provider: Eleno Pacheco MD    Anesthesia Type: general ASA Status: 3            Anesthesia Type: general    Vitals Value Taken Time   BP  08/14/24 1500   Temp  08/14/24 1500   Pulse  08/14/24 1500   Resp  08/14/24 1500   SpO2  08/14/24 1500       Anesthesia Post Evaluation    Patient location during evaluation: PACU  Patient participation: complete - patient participated  Level of consciousness: awake and alert  Pain score: 4  Pain management: adequate  Airway patency: patent  Cardiovascular status: acceptable  Respiratory status: acceptable  Hydration status: acceptable  Postoperative Nausea and Vomiting: none      No notable events documented.

## 2024-08-14 NOTE — PERIOPERATIVE NURSING NOTE
1522- Assumed care of patient at this time. Report received from JULIO C Mahan    1540- Medicated patient at this time per orders for pain rating of 7/10. Verified allergies prior to admin     1559- Criteria met for patient to discharge from Phase I     1600- PHASE II

## 2024-08-14 NOTE — OP NOTE
Left Inguinal Hernia Repair (L) Operative Note     Date: 2024  OR Location: [facility] OR    Name: Joe Alaniz : 1965, Age: 59 y.o., MRN: [id number], Sex: male    Diagnosis  Pre-op Diagnosis      * Left inguinal hernia [K40.90] Post-op Diagnosis     * Left inguinal hernia [K40.90]     Procedures  Left Inguinal Hernia Repair    Surgeons      * Varun Rangel - Primary    Resident/Fellow/Other Assistant:  Surgeons and Role:  * No surgeons found with a matching role *    Procedure Summary  Anesthesia: General  ASA: III  Anesthesia Staff: Anesthesiologist: Eleno Pacheco MD  CRNA: SMITHA Arevalo-CRNA  Estimated Blood Loss: 5mL  Intra-op Medications: Administrations occurring from 1115 to 1315 on 24:  * No intraprocedure medications in log *           Anesthesia Record               Intraprocedure I/O Totals          Intake    LR infusion 700.00 mL    Total Intake 700 mL       Output    Urine 100 mL    Est. Blood Loss 5 mL    Total Output 105 mL       Net    Net Volume 595 mL          Specimen: No specimens collected     Staff:   Scrub Person: Vanessa  Circulator: Tawana         Drains and/or Catheters:   [REMOVED] Urethral Catheter (Removed)       Tourniquet Times:         Implants:     Findings: moderate sized left indirect inguinal hernia with associated scarring     Indications: Joe Alaniz is an 59 y.o. male who is having surgery for Left inguinal hernia [K40.90].     The patient was seen in the preoperative area. The risks, benefits, complications, treatment options, non-operative alternatives, expected recovery and outcomes were discussed with the patient. The possibilities of reaction to medication, pulmonary aspiration, injury to surrounding structures, bleeding, recurrent infection, the need for additional procedures, failure to diagnose a condition, and creating a complication requiring transfusion or operation were discussed with the patient. The patient concurred with the proposed plan,  giving informed consent.  The site of surgery was properly noted/marked if necessary per policy. The patient has been actively warmed in preoperative area. Preoperative antibiotics have been ordered and given within 1 hours of incision. Venous thrombosis prophylaxis have been ordered including bilateral sequential compression devices    Procedure Details:   PREOPERATIVE DIAGNOSIS: left inguinal hernia    POSTOPERATIVE DIAGNOSIS: same    PROCEDURE PERFORMED:  Open left inguinal hernia repair    SURGEON: Dr. Varun Rangel    ANESTHESIA: GETA     ESTIMATED BLOOD LOSS: 1ml    SPECIMEN: None    COMPLICATIONS: None.    BRIEF HISTORY: This is a 59 y.o. year old male who initially presented with signs and symptoms consistent with a left inguinal hernia. History, physical, labs, and imaging were all consistent with a left inguinal hernia. The decision was therefore made to take to the patient to the operating room for a left inguinal hernia repair. The risks, benefits, alternatives, and complications of the procedure were explained to the patient and he signed informed consent to proceed.    OPERATIVE NOTE: Preoperative antibiotics were given and documented. After a surgical timeout, the patient was prepped and draped in the usual sterile fashion with chlorhexidine.  An incision was made in the left groin overlying the inguinal ligament.  The subcutaneous tissues were divided with electrocautery and the external oblique aponeurosis was encountered.  Local anesthesia was injected underneath the aponeurosis of the external oblique to protect the underlying structures and an incision was made in the external oblique aponeurosis with a scalpel.  Scissors were then used to fully open the external oblique aponeurosis.  There was a large obvious indirect inguinal hernia extending down to the scrotum.  This was fully encircled with a Penrose to assist with retraction.  The hernia sac was then identified and  from the  inguinal vessels and vas deferens which was identified and protected throughout the dissection. The hernia sac was fully dissected from the cord structures to the level of the deep inguinal ring.  Decision was made to suture ligate the hernia sac which was done with a 2-0 vicryl suture.  The redundant hernia sac was then resected.  The ligated hernia sac was then reintroduced into the abdomen.  A 6 x 3 inch piece of macro porous polypropylene mesh was then selected and cut to shape including a keyhole.  The mesh was sutured using 2-0 Prolene sutures inferiorly to the shelving edge of the inguinal ligament and superiorly to the conjoined tendon starting at the pubic tubercle.  The cord structures were placed through the keyhole and the tails of the mesh were sutured together using a 2-0 Prolene suture.  The tails of the mesh were then placed underneath the external oblique aponeurosis laterally.  Care was taken to ensure to not impinge on the cord structures during the reconstruction of the deep inguinal ring.  The mesh laid well and attention was turned to closing the external oblique aponeurosis which was done with a running 2-0 Vicryl suture.  Subcutaneous tissues were reapproximated with 3-0 Vicryl suture, local anesthetic was injected, and the skin was closed with a running 4-0 Monocryl suture.  Surgical skin glue was applied as a dressing. The patient tolerated the procedure well, there were no immediate complications, and the patient was taken to PACU in stable condition.    Complications:  None; patient tolerated the procedure well.    Disposition: PACU - hemodynamically stable.  Condition: stable         Additional Details:     Attending Attestation: I was present and scrubbed for the key portions of the procedure.    Varun Rangel  Phone Number: [phone]

## 2024-08-14 NOTE — ANESTHESIA PREPROCEDURE EVALUATION
Patient: Joe Alaniz    Procedure Information       Date/Time: 08/14/24 1115    Procedure: Left Inguinal Hernia Repair (Left)    [facility]    Surgeons: Varun Rangel MD            Relevant Problems   Cardiac   (+) HTN (hypertension)      /Renal   (+) Kidney stones, calcium oxalate       Clinical information reviewed:   Tobacco  Allergies  Meds   Med Hx  Surg Hx   Fam Hx  Soc Hx        NPO Detail:  NPO/Void Status  Carbohydrate Drink Given Prior to Surgery? : N  Date of Last Liquid: 08/14/24  Time of Last Liquid: 0700  Date of Last Solid: 08/13/24  Time of Last Solid: 1700  Last Intake Type: Clear fluids  Time of Last Void: 1015         Physical Exam    Airway  Mallampati: I  TM distance: >3 FB  Neck ROM: full     Cardiovascular - normal exam     Dental - normal exam     Pulmonary - normal exam     Abdominal - normal exam         Anesthesia Plan    History of general anesthesia?: yes  History of complications of general anesthesia?: no    ASA 3     general     The patient is not a current smoker.  Patient was not previously instructed to abstain from smoking on day of procedure.  Patient did not smoke on day of procedure.    intravenous induction   Postoperative administration of opioids is intended.  Anesthetic plan and risks discussed with patient.  Use of blood products discussed with patient who.    Plan discussed with [facility].

## 2024-08-15 DIAGNOSIS — K40.90 LEFT INGUINAL HERNIA: Primary | ICD-10-CM

## 2024-08-15 PROCEDURE — RXMED WILLOW AMBULATORY MEDICATION CHARGE

## 2024-08-15 RX ORDER — OXYCODONE HYDROCHLORIDE 5 MG/1
5 TABLET ORAL EVERY 6 HOURS PRN
Qty: 5 TABLET | Refills: 0 | Status: SHIPPED | OUTPATIENT
Start: 2024-08-15

## 2024-08-16 ENCOUNTER — TELEPHONE (OUTPATIENT)
Dept: PREOP | Facility: HOSPITAL | Age: 59
End: 2024-08-16

## 2024-08-16 ENCOUNTER — PHARMACY VISIT (OUTPATIENT)
Dept: PHARMACY | Facility: CLINIC | Age: 59
End: 2024-08-16
Payer: MEDICAID

## 2024-08-17 ENCOUNTER — PHARMACY VISIT (OUTPATIENT)
Dept: PHARMACY | Facility: CLINIC | Age: 59
End: 2024-08-17
Payer: MEDICAID

## 2024-08-17 ENCOUNTER — HOSPITAL ENCOUNTER (EMERGENCY)
Facility: HOSPITAL | Age: 59
Discharge: HOME | End: 2024-08-17
Attending: EMERGENCY MEDICINE
Payer: COMMERCIAL

## 2024-08-17 ENCOUNTER — APPOINTMENT (OUTPATIENT)
Dept: RADIOLOGY | Facility: HOSPITAL | Age: 59
End: 2024-08-17
Payer: COMMERCIAL

## 2024-08-17 VITALS
SYSTOLIC BLOOD PRESSURE: 107 MMHG | BODY MASS INDEX: 16.88 KG/M2 | HEIGHT: 66 IN | DIASTOLIC BLOOD PRESSURE: 79 MMHG | OXYGEN SATURATION: 100 % | TEMPERATURE: 98 F | WEIGHT: 105 LBS | HEART RATE: 98 BPM | RESPIRATION RATE: 16 BRPM

## 2024-08-17 DIAGNOSIS — M79.671 FOOT PAIN, BILATERAL: ICD-10-CM

## 2024-08-17 DIAGNOSIS — M79.672 FOOT PAIN, BILATERAL: ICD-10-CM

## 2024-08-17 DIAGNOSIS — G62.9 NEUROPATHY: Primary | ICD-10-CM

## 2024-08-17 PROCEDURE — RXMED WILLOW AMBULATORY MEDICATION CHARGE

## 2024-08-17 PROCEDURE — 99284 EMERGENCY DEPT VISIT MOD MDM: CPT

## 2024-08-17 PROCEDURE — 73630 X-RAY EXAM OF FOOT: CPT | Mod: LEFT SIDE | Performed by: RADIOLOGY

## 2024-08-17 PROCEDURE — 73630 X-RAY EXAM OF FOOT: CPT | Mod: RT

## 2024-08-17 PROCEDURE — 99284 EMERGENCY DEPT VISIT MOD MDM: CPT | Performed by: EMERGENCY MEDICINE

## 2024-08-17 PROCEDURE — 73630 X-RAY EXAM OF FOOT: CPT | Mod: LT

## 2024-08-17 PROCEDURE — 2500000005 HC RX 250 GENERAL PHARMACY W/O HCPCS: Mod: SE

## 2024-08-17 PROCEDURE — 73630 X-RAY EXAM OF FOOT: CPT | Mod: RIGHT SIDE | Performed by: RADIOLOGY

## 2024-08-17 RX ORDER — ACETAMINOPHEN 325 MG/1
975 TABLET ORAL ONCE
Status: COMPLETED | OUTPATIENT
Start: 2024-08-17 | End: 2024-08-17

## 2024-08-17 RX ORDER — LIDOCAINE 560 MG/1
2 PATCH PERCUTANEOUS; TOPICAL; TRANSDERMAL DAILY
Status: DISCONTINUED | OUTPATIENT
Start: 2024-08-17 | End: 2024-08-17 | Stop reason: HOSPADM

## 2024-08-17 RX ORDER — GABAPENTIN 100 MG/1
100 CAPSULE ORAL DAILY
Qty: 30 CAPSULE | Refills: 0 | Status: SHIPPED | OUTPATIENT
Start: 2024-08-17 | End: 2024-08-21 | Stop reason: WASHOUT

## 2024-08-17 RX ADMIN — ACETAMINOPHEN 975 MG: 325 TABLET ORAL at 14:21

## 2024-08-17 RX ADMIN — LIDOCAINE 2 PATCH: 4 PATCH TOPICAL at 14:21

## 2024-08-17 ASSESSMENT — PAIN DESCRIPTION - LOCATION: LOCATION: FOOT

## 2024-08-17 ASSESSMENT — COLUMBIA-SUICIDE SEVERITY RATING SCALE - C-SSRS
6. HAVE YOU EVER DONE ANYTHING, STARTED TO DO ANYTHING, OR PREPARED TO DO ANYTHING TO END YOUR LIFE?: NO
1. IN THE PAST MONTH, HAVE YOU WISHED YOU WERE DEAD OR WISHED YOU COULD GO TO SLEEP AND NOT WAKE UP?: NO
2. HAVE YOU ACTUALLY HAD ANY THOUGHTS OF KILLING YOURSELF?: NO

## 2024-08-17 ASSESSMENT — PAIN DESCRIPTION - PAIN TYPE: TYPE: ACUTE PAIN

## 2024-08-17 ASSESSMENT — PAIN DESCRIPTION - ORIENTATION: ORIENTATION: RIGHT;LEFT

## 2024-08-17 ASSESSMENT — PAIN SCALES - GENERAL: PAINLEVEL_OUTOF10: 10 - WORST POSSIBLE PAIN

## 2024-08-17 ASSESSMENT — PAIN - FUNCTIONAL ASSESSMENT: PAIN_FUNCTIONAL_ASSESSMENT: 0-10

## 2024-08-17 NOTE — DISCHARGE INSTRUCTIONS
Please follow-up with primary care physician to assess response to Neurontin and to increase dose safely over period of weeks.  Return to emergency department for reassessment if new or worsening symptoms.

## 2024-08-17 NOTE — ED PROVIDER NOTES
"History of Present Illness   Information Gathering: History collected from patient and chart review    HPI:  Joe Alaniz is a 59 y.o. male with PMH significant for gastric mass (pending biopsy), HTN, PUD w/ perforated ulcer, BPH, and left inguinal hernia presenting to the emergency department for several month history of bilateral foot pain. Patient states that he has been dealing with bilateral foot pain for the past 2 months. Pain is on the dorsal and plantar aspect of the foot. No known injuries, falls or reasons that he can think of that would cause this foot pain. It has gotten to the point to where he is having a difficult time ambulating and is \"sick of it\". Denies fevers chills rashes or sores on his feet. States that pain originally started with the right foot and now is affecting left foot. Patient is concerned that he has diabetes. Patient also states that it feels like his feet have pins-and-needles in them. No history of diabetes and the patient. States that his sister has diabetes. Patient also states that he has stomach cancer. Review of EMR shows that patient has an identified gastric mass that is pending biopsy. He is not on any chemo therapy regiment. States that he has been putting \"foot cream\" on his feet with little relief.     Physical Exam   Triage vitals:  T 36.7 °C (98 °F)  HR 98  /79  RR 16  O2 100 % None (Room air)    Physical Exam  Constitutional:       Appearance: Normal appearance.   HENT:      Head: Normocephalic and atraumatic.   Eyes:      Extraocular Movements: Extraocular movements intact.      Pupils: Pupils are equal, round, and reactive to light.   Cardiovascular:      Rate and Rhythm: Normal rate and regular rhythm.   Pulmonary:      Effort: Pulmonary effort is normal.      Breath sounds: Normal breath sounds.   Abdominal:      General: Abdomen is flat.      Palpations: Abdomen is soft.   Musculoskeletal:      Comments: Bilateral feet painful to palpation. No " "swelling erythema or abnormalities noted. Good +2 DP and posterior tibialis pulses. Feet are warm and well-perfused. No skin changes. \"Static you feel\" over bilateral feet when palpating.   Skin:     Comments: Well-healed left inguinal hernia surgery incision.   Neurological:      General: No focal deficit present.      Mental Status: He is alert and oriented to person, place, and time.         Medical Decision Making & ED Course   Medical Decision Makin y.o. male ith PMH significant for gastric mass (pending biopsy), HTN, PUD w/ perforated ulcer, BPH, and left inguinal hernia presenting to the emergency department for several month history of bilateral foot pain. On exam, patient is hemodynamically stable and afebrile. No focal neurologic deficits present on exam. Exam and history most consistent with neuropathy of unknown origin. Consider diabetes, but patient recently had glucose checked which was in the low 100s. Unlikely to develop diabetes and neuropathy within a month. For bilateral foot pain, low suspicion for fracture but x-rays obtained to evaluate for possible injury, osteomyelitis or subcutaneous air. X-rays show no evidence of any of these. For pain control, patient given 975 mg of acetaminophen and 2 lidocaine patches over bilateral feet with moderate improvement of pain. Patient's pain has been chronic for months and is without acute change today. I have low suspicion for infection, fracture, or severe metabolic disease that would be causing his symptoms. Labs from last month reviewed. Patient is already following with nutritionist for vitamin optimization for any potential vitamin deficiencies. As result of patient's chronic polyneuropathy, patient started on 100 mg daily capsule of Neurontin with instructions to follow-up with PCP and nutritionist. Patient has upcoming appointment with PCP which she said that he would attempt. Patient was then discharged home in stable condition.    ED " Course:  Diagnoses as of 08/17/24 1709   Neuropathy   Foot pain, bilateral       ----  EKG Independent Interpretation: EKG interpreted by myself. Please see ED Course for full interpretation.    Independent Result Review and Interpretation: Relevant laboratory and radiographic results were reviewed and independently interpreted by myself.  As necessary, they are commented on in the ED Course.    Chronic conditions affecting the patient's care: As documented above in MDM    The patient was discussed with the following consultants/services: As described in MDM      Disposition   As a result of the work-up, the patient was discharged home.  he was informed of his diagnosis and instructed to come back with any concerns or worsening of condition.  he and was agreeable to the plan as discussed above.  he was given the opportunity to ask questions.  All of the patient's questions were answered.    Procedures   Procedures    Patient seen and discussed with ED attending physician.    Jamil Anand MD  Emergency Medicine, PGY-2      Mario Anand MD  Resident  08/17/24 [id number]

## 2024-08-17 NOTE — ED TRIAGE NOTES
Pt endorses bilateral foot pain for the past couple of months. Pain meds are not helping states they are sometimes numb and tingle and he thinks he has diabetes.

## 2024-08-21 ENCOUNTER — APPOINTMENT (OUTPATIENT)
Dept: PRIMARY CARE | Facility: CLINIC | Age: 59
End: 2024-08-21
Payer: COMMERCIAL

## 2024-08-21 ENCOUNTER — PHARMACY VISIT (OUTPATIENT)
Dept: PHARMACY | Facility: CLINIC | Age: 59
End: 2024-08-21
Payer: MEDICAID

## 2024-08-21 ENCOUNTER — LAB (OUTPATIENT)
Dept: LAB | Facility: LAB | Age: 59
End: 2024-08-21
Payer: COMMERCIAL

## 2024-08-21 VITALS
BODY MASS INDEX: 17.92 KG/M2 | HEART RATE: 108 BPM | DIASTOLIC BLOOD PRESSURE: 79 MMHG | WEIGHT: 111 LBS | SYSTOLIC BLOOD PRESSURE: 119 MMHG | OXYGEN SATURATION: 96 %

## 2024-08-21 DIAGNOSIS — Z12.5 SCREENING PSA (PROSTATE SPECIFIC ANTIGEN): ICD-10-CM

## 2024-08-21 DIAGNOSIS — G62.9 NEUROPATHY: ICD-10-CM

## 2024-08-21 DIAGNOSIS — I10 HYPERTENSION, UNSPECIFIED TYPE: Primary | ICD-10-CM

## 2024-08-21 DIAGNOSIS — Z00.00 HEALTH CARE MAINTENANCE: ICD-10-CM

## 2024-08-21 DIAGNOSIS — I10 HYPERTENSION, UNSPECIFIED TYPE: ICD-10-CM

## 2024-08-21 DIAGNOSIS — R62.7 FAILURE TO THRIVE IN ADULT: ICD-10-CM

## 2024-08-21 DIAGNOSIS — K13.79 MOUTH SORES: ICD-10-CM

## 2024-08-21 DIAGNOSIS — Z23 NEED FOR PROPHYLACTIC VACCINATION AGAINST DIPHTHERIA AND TETANUS: ICD-10-CM

## 2024-08-21 PROBLEM — C49.A4: Status: ACTIVE | Noted: 2024-08-21

## 2024-08-21 LAB
LIPASE SERPL-CCNC: 30 U/L (ref 9–82)
PROT SERPL-MCNC: 6.3 G/DL (ref 6.4–8.2)
PSA SERPL-MCNC: 0.69 NG/ML

## 2024-08-21 PROCEDURE — 83690 ASSAY OF LIPASE: CPT

## 2024-08-21 PROCEDURE — 3074F SYST BP LT 130 MM HG: CPT | Performed by: INTERNAL MEDICINE

## 2024-08-21 PROCEDURE — RXMED WILLOW AMBULATORY MEDICATION CHARGE

## 2024-08-21 PROCEDURE — 3078F DIAST BP <80 MM HG: CPT | Performed by: INTERNAL MEDICINE

## 2024-08-21 PROCEDURE — 99204 OFFICE O/P NEW MOD 45 MIN: CPT | Performed by: INTERNAL MEDICINE

## 2024-08-21 PROCEDURE — 84155 ASSAY OF PROTEIN SERUM: CPT

## 2024-08-21 PROCEDURE — 84165 PROTEIN E-PHORESIS SERUM: CPT

## 2024-08-21 PROCEDURE — 90471 IMMUNIZATION ADMIN: CPT | Performed by: INTERNAL MEDICINE

## 2024-08-21 PROCEDURE — 90715 TDAP VACCINE 7 YRS/> IM: CPT | Performed by: INTERNAL MEDICINE

## 2024-08-21 PROCEDURE — 84153 ASSAY OF PSA TOTAL: CPT

## 2024-08-21 PROCEDURE — 83970 ASSAY OF PARATHORMONE: CPT

## 2024-08-21 PROCEDURE — 83521 IG LIGHT CHAINS FREE EACH: CPT

## 2024-08-21 PROCEDURE — 36415 COLL VENOUS BLD VENIPUNCTURE: CPT

## 2024-08-21 RX ORDER — AMLODIPINE BESYLATE 10 MG/1
10 TABLET ORAL DAILY
Qty: 90 TABLET | Refills: 0 | Status: SHIPPED | OUTPATIENT
Start: 2024-08-21 | End: 2024-11-19

## 2024-08-21 RX ORDER — HYDROCHLOROTHIAZIDE 12.5 MG/1
12.5 TABLET ORAL DAILY
Qty: 90 TABLET | Refills: 0 | Status: SHIPPED | OUTPATIENT
Start: 2024-08-21 | End: 2025-02-17

## 2024-08-21 RX ORDER — AMITRIPTYLINE HYDROCHLORIDE 10 MG/1
10 TABLET, FILM COATED ORAL 2 TIMES DAILY PRN
Qty: 60 TABLET | Refills: 1 | Status: SHIPPED | OUTPATIENT
Start: 2024-08-21 | End: 2024-10-20

## 2024-08-21 NOTE — ASSESSMENT & PLAN NOTE
Has upcoming appointment with oncology to discuss interventions.  Plan colonoscopy.  Advised ferrous sulfate 325 mg once daily at least.

## 2024-08-21 NOTE — PROGRESS NOTES
Subjective   Patient ID: Joe Alaniz is a 59 y.o. male who presents for [facility].    HPI     Patient is a 59-year-old male with past medical history of gastric mass showing GIST tumor hypertension PUD with perforated ulcer BPH left inguinal hernia status post incisional repair on 8/14/2024 who presents to [facility].  Patient has not seen a primary care doctor in more than a decade.  He has been receiving some care at the emergency room.  Regards to his hypertension he is been taking his blood pressure medications as prescribed however he has been out of medications in the last 24 hours.  He gets his medications at the emergency room.     No headaches changes in vision orthopnea.    He does have some left inguinal tenderness which is likely related to the surgery.  No blood in the stool.  Pain is improving.      He does have ongoing anemia as well as cachexia.  He had an upper endoscopy showing a mass.  He has follow-up with oncology in 5 days.  He has an iron deficiency anemia.  He has a scheduled colonoscopy for 28 August.  He has been pan scanned   And no other masses or lesions other than a pulmonary nodule that does not require the workup    He does complain of peripheral neuropathy of unclear etiology.  He was given gabapentin in the emergency room but he feels like it has not improved.  He takes B12 daily    He has had mouth sores in the past but that is resolved since seeing the dentist.  He states he had a tooth removed      Review of Systems  Constitutional: No fever or chills  Cardiovascular: no chest pain, no palpitations and no syncope.   Respiratory: no cough, no shortness of breath during exertion and no shortness of breath at rest.   Gastrointestinal: no abdominal pain, no nausea and no vomiting.  Neuro: No Headache, no dizziness    Objective   /79   Pulse 108   Wt 50.3 kg (111 lb)   SpO2 96%   BMI 17.92 kg/m²     Physical Exam  Constitutional: Alert and in no acute distress.  Well developed, well nourished  Head and Face: Head and face: Normal.    Cardiovascular: Heart rate and rhythm were normal, normal S1 and S2. No peripheral edema.   Pulmonary: No respiratory distress. Clear bilateral breath sounds.  Musculoskeletal: Gait and station: Normal. Muscle strength/tone: Normal.   Skin: Normal skin color and pigmentation, normal skin turgor, and no rash.    Psychiatric: Judgment and insight: Intact. Mood and affect: Normal.    Procedures    Lab Results   Component Value Date    WBC 6.9 07/16/2024    HGB 11.2 (L) 07/16/2024    HCT 33.5 (L) 07/16/2024     (H) 07/16/2024    CHOL 165 09/24/2023    TRIG 98 09/24/2023    HDL 77.5 09/24/2023    ALT 28 07/10/2024    AST 28 07/10/2024     07/16/2024    K 3.8 07/16/2024     07/16/2024    CREATININE 0.88 07/16/2024    BUN 7 07/16/2024    CO2 23 07/16/2024    TSH 1.13 07/11/2024    INR 1.0 07/12/2024    HGBA1C 5.8 (H) 07/10/2024       XR foot left 3+ views  Narrative: Interpreted By:  Ijeoma Herrera and Benza Andrew   STUDY:  Left foot dated  8/17/2024.      INDICATION:  Signs/Symptoms:Plantar foot pain for months. Difficulty ambulating      COMPARISON:  None.      ACCESSION NUMBER(S):  [id number]      ORDERING CLINICIAN:  DAGO BRUCE      TECHNIQUE:  AP, lateral, and oblique radiographs of the left foot.      FINDINGS:  No fracture or dislocation is evident.  No ankle joint effusion is  evident. Soft tissues are grossly unremarkable. An os peroneum is  noted.      Impression: No osseous injury is evident.      I personally reviewed the images/study and I agree with the findings  as stated above by resident physician, John Palomo MD. This study  was interpreted at [place], [place].      MACRO:  None.      Signed by: Ijeoma Herrera 8/17/2024 3:33 PM  Dictation workstation:   [id number]  XR foot right 3+ views  Narrative: Interpreted By:  Ijeoma Herrera and Benza Andrew    STUDY:  Right foot dated  8/17/2024.      INDICATION:  Signs/Symptoms:Plantar foot pain for months. Difficulty ambulating      COMPARISON:  None.      ACCESSION NUMBER(S):  [id number]      ORDERING CLINICIAN:  DAGO BRUCE      TECHNIQUE:  AP, lateral, and oblique radiographs of the right foot.      FINDINGS:  No fracture or dislocation is evident.  No ankle joint effusion is  evident. Soft tissues are grossly unremarkable. An os peroneum is  noted.      Impression: No osseous injury is evident.      I personally reviewed the images/study and I agree with the findings  as stated above by resident physician, John Palomo MD. This study  was interpreted at [place], [place].      MACRO:  None.      Signed by: Ijeoma Herrera 8/17/2024 3:33 PM  Dictation workstation:   [id number]            Assessment/Plan   Problem List Items Addressed This Visit             ICD-10-CM    Failure to thrive in adult R62.7     Check PSA.  Advised boost supplementation with meals.         Relevant Orders    Lipase    Serum Protein Electrophoresis    Protein, Urine Random    Kappa/Lambda Free Light Chain, Serum    PTH, intact    Follow Up In [facility] - PCP - Established    HTN (hypertension) - Primary I10     Controlled on current medications.  No medication adjustments         Relevant Medications    amLODIPine (Norvasc) 10 mg tablet    hydroCHLOROthiazide (Microzide) 12.5 mg tablet    Mouth sores K13.79    Relevant Orders    Follow Up In [facility] - PCP - Established    Neuropathy G62.9    Relevant Medications    amitriptyline (Elavil) 10 mg tablet    Other Relevant Orders    Serum Protein Electrophoresis    Protein, Urine Random    Kappa/Lambda Free Light Chain, Serum    PTH, intact    Screening PSA (prostate specific antigen) Z12.5    Relevant Orders    Prostate Spec.Ag,Screen     Other Visit Diagnoses         Codes    Need for prophylactic vaccination  against diphtheria and tetanus     Z23    Relevant Orders    Tdap vaccine, age 7 years and older    Follow Up In [facility] - PCP - Established    Health care maintenance     Z00.00    Relevant Orders    Referral to Ophthalmology

## 2024-08-21 NOTE — ASSESSMENT & PLAN NOTE
Status post repair.  Overall symptomatic improvement.  Still a little tender.  Advised Tylenol as needed

## 2024-08-22 LAB
KAPPA LC SERPL-MCNC: 2 MG/DL (ref 0.33–1.94)
KAPPA LC/LAMBDA SER: 1.04 {RATIO} (ref 0.26–1.65)
LAMBDA LC SERPL-MCNC: 1.92 MG/DL (ref 0.57–2.63)
PTH-INTACT SERPL-MCNC: 38.5 PG/ML (ref 18.5–88)

## 2024-08-23 LAB
ALBUMIN: 3.3 G/DL (ref 3.4–5)
ALPHA 1 GLOBULIN: 0.4 G/DL (ref 0.2–0.6)
ALPHA 2 GLOBULIN: 1.1 G/DL (ref 0.4–1.1)
BETA GLOBULIN: 0.8 G/DL (ref 0.5–1.2)
GAMMA GLOBULIN: 0.7 G/DL (ref 0.5–1.4)
PATH REVIEW-SERUM PROTEIN ELECTROPHORESIS: ABNORMAL
PROTEIN ELECTROPHORESIS COMMENT: ABNORMAL

## 2024-08-26 ENCOUNTER — APPOINTMENT (OUTPATIENT)
Dept: HEMATOLOGY/ONCOLOGY | Facility: HOSPITAL | Age: 59
End: 2024-08-26
Payer: COMMERCIAL

## 2024-08-26 DIAGNOSIS — K40.90 LEFT INGUINAL HERNIA: Primary | ICD-10-CM

## 2024-08-26 PROCEDURE — RXMED WILLOW AMBULATORY MEDICATION CHARGE

## 2024-08-26 RX ORDER — TRAMADOL HYDROCHLORIDE 50 MG/1
50 TABLET ORAL EVERY 6 HOURS PRN
Qty: 10 TABLET | Refills: 0 | Status: SHIPPED | OUTPATIENT
Start: 2024-08-26 | End: 2024-09-03

## 2024-08-27 ENCOUNTER — PHARMACY VISIT (OUTPATIENT)
Dept: PHARMACY | Facility: CLINIC | Age: 59
End: 2024-08-27
Payer: MEDICAID

## 2024-08-27 DIAGNOSIS — Z12.11 COLON CANCER SCREENING: Primary | ICD-10-CM

## 2024-08-27 PROCEDURE — RXMED WILLOW AMBULATORY MEDICATION CHARGE

## 2024-08-27 RX ORDER — POLYETHYLENE GLYCOL 3350, SODIUM SULFATE ANHYDROUS, SODIUM BICARBONATE, SODIUM CHLORIDE, POTASSIUM CHLORIDE 236; 22.74; 6.74; 5.86; 2.97 G/4L; G/4L; G/4L; G/4L; G/4L
4000 POWDER, FOR SOLUTION ORAL ONCE
Qty: 4000 ML | Refills: 0 | Status: SHIPPED | OUTPATIENT
Start: 2024-08-27 | End: 2024-08-28 | Stop reason: SDUPTHER

## 2024-08-27 RX ORDER — SODIUM CHLORIDE, SODIUM LACTATE, POTASSIUM CHLORIDE, CALCIUM CHLORIDE 600; 310; 30; 20 MG/100ML; MG/100ML; MG/100ML; MG/100ML
20 INJECTION, SOLUTION INTRAVENOUS CONTINUOUS
Status: CANCELLED | OUTPATIENT
Start: 2024-08-27

## 2024-08-28 ENCOUNTER — HOSPITAL ENCOUNTER (OUTPATIENT)
Dept: GASTROENTEROLOGY | Facility: HOSPITAL | Age: 59
Discharge: HOME | End: 2024-08-28
Payer: COMMERCIAL

## 2024-08-28 ENCOUNTER — PHARMACY VISIT (OUTPATIENT)
Dept: PHARMACY | Facility: CLINIC | Age: 59
End: 2024-08-28
Payer: MEDICAID

## 2024-08-28 DIAGNOSIS — C49.A2 MALIGNANT GASTROINTESTINAL STROMAL TUMOR (GIST) OF STOMACH (MULTI): ICD-10-CM

## 2024-08-28 DIAGNOSIS — Z12.11 SCREENING FOR COLORECTAL CANCER: ICD-10-CM

## 2024-08-28 DIAGNOSIS — Z12.12 SCREENING FOR COLORECTAL CANCER: ICD-10-CM

## 2024-08-28 PROCEDURE — RXMED WILLOW AMBULATORY MEDICATION CHARGE

## 2024-08-28 RX ORDER — POLYETHYLENE GLYCOL 3350, SODIUM SULFATE ANHYDROUS, SODIUM BICARBONATE, SODIUM CHLORIDE, POTASSIUM CHLORIDE 236; 22.74; 6.74; 5.86; 2.97 G/4L; G/4L; G/4L; G/4L; G/4L
POWDER, FOR SOLUTION ORAL
Qty: 4000 ML | Refills: 0 | OUTPATIENT
Start: 2024-08-28

## 2024-08-28 NOTE — SIGNIFICANT EVENT
Ate chicken soup. Still had brown soft stool 8AM. Reschedule tomorrow afternoon with Dr. Hughes. Called [facility] pharmacy for another bottle of golytely. Pt is going to pick it up before leaving here.

## 2024-08-28 NOTE — PROGRESS NOTES
Assessment and Plan:  Mr. Alaniz is a very pleasant 59-year-old man with a newly diagnosed GIST in the greater curve of the stomach.  We discussed that surgery for this would be a robotic or laparoscopic wedge gastrectomy and would likely be curative.  Given the size, 18 mm on EUS, we are not obligated to resect this necessarily.  If we do not remove it we should follow it with imaging to see if and how quickly it grows. ***    I spent 60 minutes in the professional and overall care of this patient.    Adriano Chung MD  Assistant Professor of Surgery  Division of Surgical Oncology  [phone]  [email or web address]      History Of Present Illness  Joe Alaniz is a 59 y.o. male referred by Petrona Muhammad for GIST.    The patient presented to the emergency room on 7/10/2024 for syncope and noted significant unintentional weight loss.  CT scan showed a 2 cm exophytic mass on the greater curve of the stomach.  EGD/EUS on 7/12/2024 showed grade D esophagitis and an 18 mm hypoechoic round submucosal mass in the posterior fundus of the stomach.  FNA was performed which revealed GIST.  Of note, he reports of surgery for bleeding gastric ulcer while he was 11 years old.    On 8/14/2024 he underwent an open left inguinal hernia repair with Dr. Varun Rangel.    ***    All other systems have been reviewed and are negative except as noted in the HPI.    I personally reviewed all necessary laboratory results, pathology reports, and radiologic images for this patient.    Past Medical History  He has a past medical history of BPH (benign prostatic hyperplasia), Dental caries, Esophagitis determined by endoscopy, Failure to thrive in adult, Former smoker, Gastritis, GERD (gastroesophageal reflux disease), GIST, malignant (Multi), HTN (hypertension), PUD (peptic ulcer disease), Syncope, and Unintentional weight loss.    Surgical History  He has a past surgical history that includes Small intestine surgery;  Esophagogastroduodenoscopy; [place] tooth extraction; and Hernia repair (Left, 08/14/2024).     Social History  He reports that he quit smoking about 6 weeks ago. His smoking use included cigarettes. He started smoking about 41 years ago. He has a 41.5 pack-year smoking history. He has quit using smokeless tobacco. He reports that he does not currently use alcohol. He reports current drug use. Drug: Marijuana.    Family History  Family History   Problem Relation Name Age of Onset    Cancer Father      Liver cancer Father's Brother      Cancer Paternal Grandmother          Allergies  Erythromycin     Last Recorded Vitals  There were no vitals taken for this visit.    Physical Exam  General: no acute distress, well-nourished  Eyes: intact EOM, no scleral icterus  ENT: hearing intact, no drainage  Respiratory: symmetric chest rise, no cough  Cardiovascular: intact distal pulses, no pitting edema  Abdominal: soft, nontender, nondistended  Musculoskeletal: no deformities, intact strength  Integumentary: warm, dry, no lymphadenopathy  Neuro: no focal deficits, sensation intact  Psych: normal mood and affect       Relevant Results  FINAL DIAGNOSIS   A. STOMACH, ANTRUM, BODY, INCISURA, BIOPSY:   -- Gastric antral- and oxyntic-type mucosa with mild reactive epithelial change and focal minimal acute and chronic inflammation.    -- No Helicobacter pylori organisms identified on routine stained slides.       B. ESOPHAGUS DISTAL BIOPSY:   -- Superficial strips of squamous esophageal mucosa with acute inflammation and reactive epithelial change.  -- Detached fragments of fibrinoinflammatory exudate (ulceration).   -- No intestinal metaplasia identified.    -- See note.     Note:  GMS stain is negative for fungal elements.  HSV immunohistochemical stain is negative in the cells of interest.       : Dr. RENETTA HERNDON GIST FNB:   -- Gastrointestinal stromal tumor (GIST).  See note.     Note:  Minute fragments of a  spindle cell neoplasm are identified.  No mitotic figures or necrosis are appreciated on this limited biopsy specimen.       DOG-1, desmin, and S100 immunohistochemical stains are performed.  The neoplastic cell are positive for DOG-1; and negative for desmin and S100.  Overall, the morphologic and immunohistochemical staining profile support the above diagnosis.

## 2024-08-29 ENCOUNTER — TELEPHONE (OUTPATIENT)
Dept: GASTROENTEROLOGY | Facility: HOSPITAL | Age: 59
End: 2024-08-29

## 2024-08-29 ENCOUNTER — APPOINTMENT (OUTPATIENT)
Dept: SURGICAL ONCOLOGY | Facility: HOSPITAL | Age: 59
End: 2024-08-29
Payer: COMMERCIAL

## 2024-08-29 ENCOUNTER — APPOINTMENT (OUTPATIENT)
Dept: PRIMARY CARE | Facility: CLINIC | Age: 59
End: 2024-08-29
Payer: COMMERCIAL

## 2024-08-30 ENCOUNTER — APPOINTMENT (OUTPATIENT)
Dept: SURGERY | Facility: CLINIC | Age: 59
End: 2024-08-30
Payer: COMMERCIAL

## 2024-09-03 ENCOUNTER — TELEPHONE (OUTPATIENT)
Dept: SCHEDULING | Age: 59
End: 2024-09-03

## 2024-09-03 NOTE — TELEPHONE ENCOUNTER
I spoke with Mr. Alaniz on 7/24 at 10:38am and he stated he wanted to get a colonoscopy and his PSA done, I spoke with Maria E and she will check with [facility] about who should order his colonoscopy and PSA. I connected Mr. Alaniz to Maria E. Mr. Alaniz is scheduled for his colonoscopy 8/28. I spoke with [facility] office and informed them that Mr. Alaniz would like his PSA done on 8/9 new PCP visit. Joe also cancel his 8/26 and 9/16 appointments with Dr. Muhammad.

## 2024-09-05 DIAGNOSIS — Z12.11 COLON CANCER SCREENING: Primary | ICD-10-CM

## 2024-09-05 PROCEDURE — RXMED WILLOW AMBULATORY MEDICATION CHARGE

## 2024-09-05 RX ORDER — POLYETHYLENE GLYCOL 3350, SODIUM CHLORIDE, SODIUM BICARBONATE, POTASSIUM CHLORIDE 420; 11.2; 5.72; 1.48 G/4L; G/4L; G/4L; G/4L
4000 POWDER, FOR SOLUTION ORAL ONCE
Qty: 4000 ML | Refills: 0 | Status: SHIPPED | OUTPATIENT
Start: 2024-09-05 | End: 2024-09-06

## 2024-09-06 ENCOUNTER — APPOINTMENT (OUTPATIENT)
Dept: SURGERY | Facility: CLINIC | Age: 59
End: 2024-09-06
Payer: COMMERCIAL

## 2024-09-06 VITALS
SYSTOLIC BLOOD PRESSURE: 109 MMHG | HEART RATE: 112 BPM | BODY MASS INDEX: 17.24 KG/M2 | RESPIRATION RATE: 16 BRPM | DIASTOLIC BLOOD PRESSURE: 78 MMHG | HEIGHT: 64 IN | WEIGHT: 101 LBS

## 2024-09-06 DIAGNOSIS — K40.90 LEFT INGUINAL HERNIA: Primary | ICD-10-CM

## 2024-09-06 PROCEDURE — 99024 POSTOP FOLLOW-UP VISIT: CPT | Performed by: STUDENT IN AN ORGANIZED HEALTH CARE EDUCATION/TRAINING PROGRAM

## 2024-09-06 PROCEDURE — 3008F BODY MASS INDEX DOCD: CPT | Performed by: STUDENT IN AN ORGANIZED HEALTH CARE EDUCATION/TRAINING PROGRAM

## 2024-09-06 PROCEDURE — 3074F SYST BP LT 130 MM HG: CPT | Performed by: STUDENT IN AN ORGANIZED HEALTH CARE EDUCATION/TRAINING PROGRAM

## 2024-09-06 PROCEDURE — 3078F DIAST BP <80 MM HG: CPT | Performed by: STUDENT IN AN ORGANIZED HEALTH CARE EDUCATION/TRAINING PROGRAM

## 2024-09-06 ASSESSMENT — PAIN SCALES - GENERAL: PAINLEVEL: 10-WORST PAIN EVER

## 2024-09-06 NOTE — PROGRESS NOTES
History Of Present Illness  Patient is a 59 y.o. male here for a follow up examination following open L inguinal hernia repair on August 14th. He has mild pain in the inguinal region that is worse by lying on his L side. Pain is controlled with Tylenol. He has had some constipation 2 days ago but has been having regular BM since taking metamucil. He is eating a regular diet. He denies fever, nausea, vomiting, or diarrhea.     Past Medical History  Past Medical History:   Diagnosis Date    BPH (benign prostatic hyperplasia)     Dental caries     Esophagitis determined by endoscopy     Failure to thrive in adult     Former smoker     Gastritis     GERD (gastroesophageal reflux disease)     GIST, malignant (Multi)     HTN (hypertension)     PUD (peptic ulcer disease)     Syncope     Unintentional weight loss        Surgical History  Past Surgical History:   Procedure Laterality Date    ESOPHAGOGASTRODUODENOSCOPY      gastric mass biopsy    HERNIA REPAIR Left 08/14/2024    left inguinal hernia repair with dr hernandez    SMALL INTESTINE SURGERY      PUD w/ perforated ulcer, s/p surgical resection    WISDOM TOOTH EXTRACTION          Social History  He reports that he quit smoking about 8 weeks ago. His smoking use included cigarettes. He started smoking about 41 years ago. He has a 41.5 pack-year smoking history. He has quit using smokeless tobacco. He reports that he does not currently use alcohol. He reports current drug use. Drug: Marijuana.    Family History  Family History   Problem Relation Name Age of Onset    Cancer Father      Liver cancer Father's Brother      Cancer Paternal Grandmother          Allergies  Erythromycin    Review of Systems  - CONSTITUTIONAL: Denies fever and chills.  - HEENT: Denies changes in vision and hearing.  - RESPIRATORY: Denies SOB and cough.  - CV: Denies palpitations and CP.  - GI: Denies abdominal pain, nausea, vomiting and diarrhea.  - : Denies dysuria and urinary frequency.  - MSK:  "Denies myalgia and joint pain.  - SKIN: Denies rash and pruritus.  - NEUROLOGICAL: Denies headache and syncope.  - PSYCHIATRIC: Denies recent changes in mood. Denies anxiety and depression.     Physical Exam  - GENERAL: Alert and oriented x 3. No acute distress. Well-nourished.  - EYES: EOMI. Anicteric.  - HENT: Moist mucous membranes. No scleral icterus. No cervical lymphadenopathy.  - LUNGS: Breathing comfortably on room air. No accessory muscle use, no distress.  - CARDIOVASCULAR: Regular rate and rhythm. No murmur. No JVD.  - ABDOMEN: There is a well healing surgical scar and some tenderness to palpation in the L inguinal region.  Abdomen is soft, non-tender and non-distended.  - EXTREMITIES: No edema. Non-tender.  - SKIN: No rashes or lesions. Warm.  - NEUROLOGIC: No focal neurological deficits. CN II-XII grossly intact, but not individually tested.  - PSYCHIATRIC: Cooperative. Appropriate mood and affect.    Last Recorded Vitals  Blood pressure 109/78, pulse (!) 112, resp. rate 16, height 1.626 m (5' 4\"), weight 45.8 kg (101 lb).    Relevant Results  Procedure Not Performed    Result Date: 8/28/2024  Table formatting from the original result was not included. This procedure was not performed. Procedure: COLONOSCOPY [GI6] Cancel Information  Procedure Not Performed Reason Inadequate Bowel Prep Procedure Not Performed Comments       XR foot left 3+ views    Result Date: 8/17/2024  Interpreted By:  Ijeoma Herrera and Benza Andrew STUDY: Left foot dated  8/17/2024.   INDICATION: Signs/Symptoms:Plantar foot pain for months. Difficulty ambulating   COMPARISON: None.   ACCESSION NUMBER(S): [id number]   ORDERING CLINICIAN: DAGO BRUCE   TECHNIQUE: AP, lateral, and oblique radiographs of the left foot.   FINDINGS: No fracture or dislocation is evident.  No ankle joint effusion is evident. Soft tissues are grossly unremarkable. An os peroneum is noted.       No osseous injury is evident.   I personally reviewed " the images/study and I agree with the findings as stated above by resident physician, John Palomo MD. This study was interpreted at [place], [place].   MACRO: None.   Signed by: Ijeoma Herrera 8/17/2024 3:33 PM Dictation workstation:   [id number]    XR foot right 3+ views    Result Date: 8/17/2024  Interpreted By:  Ijeoma Herrera and Benza Andrew STUDY: Right foot dated  8/17/2024.   INDICATION: Signs/Symptoms:Plantar foot pain for months. Difficulty ambulating   COMPARISON: None.   ACCESSION NUMBER(S): [id number]   ORDERING CLINICIAN: DAGO BRUCE   TECHNIQUE: AP, lateral, and oblique radiographs of the right foot.   FINDINGS: No fracture or dislocation is evident.  No ankle joint effusion is evident. Soft tissues are grossly unremarkable. An os peroneum is noted.       No osseous injury is evident.   I personally reviewed the images/study and I agree with the findings as stated above by resident physician, John Palomo MD. This study was interpreted at [place], [place].   MACRO: None.   Signed by: Ijeoma Herrera 8/17/2024 3:33 PM Dictation workstation:   [id number]       Assessment and Plan  Assessment:  Patient is a 59 y.o. male who is healing well following open L inguinal hernia repair. On PE there is a well healing surgical scar with mild tenderness to palpation that is within expectation following surgery. Patient is tolerating a regular diet with no fever, nausea, vomiting, or diarrhea.     Plan:   Advised to continue regular diet and hydration. If any significant changes occurs such as nausea, vomiting, or fever come back for an evaluation.     BURKE PULIDO    Patient seen and agree with above. Recovering well from open LIH repair. Resume usual activities. Follow up as needed.

## 2024-09-09 ENCOUNTER — TELEPHONE (OUTPATIENT)
Dept: PRIMARY CARE | Facility: CLINIC | Age: 59
End: 2024-09-09
Payer: COMMERCIAL

## 2024-09-09 DIAGNOSIS — R62.7 FAILURE TO THRIVE IN ADULT: ICD-10-CM

## 2024-09-09 PROCEDURE — RXMED WILLOW AMBULATORY MEDICATION CHARGE

## 2024-09-09 RX ORDER — LANOLIN ALCOHOL/MO/W.PET/CERES
1000 CREAM (GRAM) TOPICAL DAILY
Qty: 30 TABLET | Refills: 0 | Status: SHIPPED | OUTPATIENT
Start: 2024-09-09

## 2024-09-09 RX ORDER — ERGOCALCIFEROL 1.25 MG/1
50000 CAPSULE ORAL
Qty: 8 CAPSULE | Refills: 0 | Status: SHIPPED | OUTPATIENT
Start: 2024-09-09

## 2024-09-09 RX ORDER — FERROUS SULFATE 325(65) MG
325 TABLET ORAL
Qty: 30 TABLET | Refills: 0 | Status: SHIPPED | OUTPATIENT
Start: 2024-09-09

## 2024-09-10 ENCOUNTER — PHARMACY VISIT (OUTPATIENT)
Dept: PHARMACY | Facility: CLINIC | Age: 59
End: 2024-09-10
Payer: MEDICAID

## 2024-09-11 NOTE — PROGRESS NOTES
Assessment and Plan:  Mr. Alaniz is a very pleasant 59-year-old man with a newly diagnosed GIST in the greater curve of the stomach.  We discussed that surgery for this would be a robotic or laparoscopic wedge gastrectomy and would likely be curative.  Given the size, 18 mm on EUS, we are not obligated to resect this necessarily.  If we do not remove it we should follow it with imaging to see if and how quickly it grows.  However, because he is relatively young, I am certain this will grow over time and we will eventually discuss surgery.  He was of the mindset to just get it out and get it done.  We will schedule him for a laparoscopic partial gastrectomy in the coming weeks.  We will also place referral to neurology to investigate his bilateral lower extremity paresthesia and pain without focal injury or mechanical deformity which clinically sounds like neuropathy.    I spent 60 minutes in the professional and overall care of this patient.    Adriano Chung MD  Assistant Professor of Surgery  Division of Surgical Oncology  [phone]  [email or web address]      History Of Present Illness  Joe Alaniz is a 59 y.o. male referred by Petrona Muhammad for GIST.    The patient presented to the emergency room on 7/10/2024 for syncope and noted significant unintentional weight loss.  CT scan showed a 2 cm exophytic mass on the greater curve of the stomach.  EGD/EUS on 7/12/2024 showed grade D esophagitis and an 18 mm hypoechoic round submucosal mass in the posterior fundus of the stomach.  FNA was performed which revealed GIST.  Of note, he reports of surgery for bleeding gastric ulcer while he was 11 years old.    On 8/14/2024 he underwent an open left inguinal hernia repair with Dr. Varun Rangel.  He is recovering very well from that.  His only complaint at this point is his bilateral foot pain which prevents him from being able to do any sort of walking and is very severe.    All other systems have been  reviewed and are negative except as noted in the HPI.    I personally reviewed all necessary laboratory results, pathology reports, and radiologic images for this patient.    Past Medical History  He has a past medical history of BPH (benign prostatic hyperplasia), Dental caries, Esophagitis determined by endoscopy, Failure to thrive in adult, Former smoker, Gastritis, GERD (gastroesophageal reflux disease), GIST, malignant (Multi), HTN (hypertension), PUD (peptic ulcer disease), Syncope, and Unintentional weight loss.    Surgical History  He has a past surgical history that includes Small intestine surgery; Esophagogastroduodenoscopy; [place] tooth extraction; and Hernia repair (Left, 08/14/2024).     Social History  He reports that he quit smoking about 2 months ago. His smoking use included cigarettes. He started smoking about 41 years ago. He has a 41.5 pack-year smoking history. He has quit using smokeless tobacco. He reports that he does not currently use alcohol. He reports current drug use. Drug: Marijuana.    Family History  Family History   Problem Relation Name Age of Onset    Cancer Father      Liver cancer Father's Brother      Cancer Paternal Grandmother          Allergies  Erythromycin     Last Recorded Vitals  There were no vitals taken for this visit.    Physical Exam  General: no acute distress, well-nourished, wheelchair  Eyes: intact EOM, no scleral icterus  ENT: hearing intact, no drainage  Respiratory: symmetric chest rise, no cough  Cardiovascular: intact distal pulses, no pitting edema  Abdominal: soft, nontender, nondistended  Musculoskeletal: no deformities, intact strength  Integumentary: warm, dry, no lymphadenopathy  Neuro: no focal deficits, sensation intact  Psych: normal mood and affect       Relevant Results  FINAL DIAGNOSIS   A. STOMACH, ANTRUM, BODY, INCISURA, BIOPSY:   -- Gastric antral- and oxyntic-type mucosa with mild reactive epithelial change and focal minimal acute and  chronic inflammation.    -- No Helicobacter pylori organisms identified on routine stained slides.       B. ESOPHAGUS DISTAL BIOPSY:   -- Superficial strips of squamous esophageal mucosa with acute inflammation and reactive epithelial change.  -- Detached fragments of fibrinoinflammatory exudate (ulceration).   -- No intestinal metaplasia identified.    -- See note.     Note:  GMS stain is negative for fungal elements.  HSV immunohistochemical stain is negative in the cells of interest.       : Dr. RENETTA HERNDON GIST FNB:   -- Gastrointestinal stromal tumor (GIST).  See note.     Note:  Minute fragments of a spindle cell neoplasm are identified.  No mitotic figures or necrosis are appreciated on this limited biopsy specimen.       DOG-1, desmin, and S100 immunohistochemical stains are performed.  The neoplastic cell are positive for DOG-1; and negative for desmin and S100.  Overall, the morphologic and immunohistochemical staining profile support the above diagnosis.

## 2024-09-12 ENCOUNTER — OFFICE VISIT (OUTPATIENT)
Dept: SURGICAL ONCOLOGY | Facility: HOSPITAL | Age: 59
End: 2024-09-12
Payer: COMMERCIAL

## 2024-09-12 ENCOUNTER — HOSPITAL ENCOUNTER (OUTPATIENT)
Facility: HOSPITAL | Age: 59
Setting detail: OUTPATIENT SURGERY
End: 2024-09-12
Attending: STUDENT IN AN ORGANIZED HEALTH CARE EDUCATION/TRAINING PROGRAM | Admitting: STUDENT IN AN ORGANIZED HEALTH CARE EDUCATION/TRAINING PROGRAM
Payer: COMMERCIAL

## 2024-09-12 DIAGNOSIS — C49.A4 GIST (GASTROINTESTINAL STROMA TUMOR), MALIGNANT, COLON (MULTI): ICD-10-CM

## 2024-09-12 DIAGNOSIS — G62.9 NEUROPATHY: Primary | ICD-10-CM

## 2024-09-12 PROCEDURE — 99214 OFFICE O/P EST MOD 30 MIN: CPT | Mod: 57 | Performed by: STUDENT IN AN ORGANIZED HEALTH CARE EDUCATION/TRAINING PROGRAM

## 2024-09-12 PROCEDURE — 99204 OFFICE O/P NEW MOD 45 MIN: CPT | Performed by: STUDENT IN AN ORGANIZED HEALTH CARE EDUCATION/TRAINING PROGRAM

## 2024-09-12 RX ORDER — SODIUM CHLORIDE, SODIUM LACTATE, POTASSIUM CHLORIDE, CALCIUM CHLORIDE 600; 310; 30; 20 MG/100ML; MG/100ML; MG/100ML; MG/100ML
20 INJECTION, SOLUTION INTRAVENOUS CONTINUOUS
OUTPATIENT
Start: 2024-09-12

## 2024-09-12 NOTE — LETTER
September 12, 2024     Ana Muhammad MD PhD  [address]  [facility] [address]    Patient: Joe Alaniz   YOB: 1965   Date of Visit: 9/12/2024       Dear Dr. Ana Muhammad MD PhD:    Thank you for referring Joe Alaniz to me for evaluation. Below are my notes for this consultation.  If you have questions, please do not hesitate to call me. I look forward to following your patient along with you.       Sincerely,     Adriano Chung MD      CC: MD Nilo Mcbride, DO  ______________________________________________________________________________________    Assessment and Plan:  Mr. Alaniz is a very pleasant 59-year-old man with a newly diagnosed GIST in the greater curve of the stomach.  We discussed that surgery for this would be a robotic or laparoscopic wedge gastrectomy and would likely be curative.  Given the size, 18 mm on EUS, we are not obligated to resect this necessarily.  If we do not remove it we should follow it with imaging to see if and how quickly it grows.  However, because he is relatively young, I am certain this will grow over time and we will eventually discuss surgery.  He was of the mindset to just get it out and get it done.  We will schedule him for a laparoscopic partial gastrectomy in the coming weeks.  We will also place referral to neurology to investigate his bilateral lower extremity paresthesia and pain without focal injury or mechanical deformity which clinically sounds like neuropathy.    I spent 60 minutes in the professional and overall care of this patient.    Adriano Chung MD  Assistant Professor of Surgery  Division of Surgical Oncology  [phone]  [email or web address]      History Of Present Illness  Joe Alaniz is a 59 y.o. male referred by Petrona Muhammad for GIST.    The patient presented to the emergency room on 7/10/2024 for syncope and noted significant unintentional weight loss.  CT scan showed a 2 cm  exophytic mass on the greater curve of the stomach.  EGD/EUS on 7/12/2024 showed grade D esophagitis and an 18 mm hypoechoic round submucosal mass in the posterior fundus of the stomach.  FNA was performed which revealed GIST.  Of note, he reports of surgery for bleeding gastric ulcer while he was 11 years old.    On 8/14/2024 he underwent an open left inguinal hernia repair with Dr. Varun Rangel.  He is recovering very well from that.  His only complaint at this point is his bilateral foot pain which prevents him from being able to do any sort of walking and is very severe.    All other systems have been reviewed and are negative except as noted in the HPI.    I personally reviewed all necessary laboratory results, pathology reports, and radiologic images for this patient.    Past Medical History  He has a past medical history of BPH (benign prostatic hyperplasia), Dental caries, Esophagitis determined by endoscopy, Failure to thrive in adult, Former smoker, Gastritis, GERD (gastroesophageal reflux disease), GIST, malignant (Multi), HTN (hypertension), PUD (peptic ulcer disease), Syncope, and Unintentional weight loss.    Surgical History  He has a past surgical history that includes Small intestine surgery; Esophagogastroduodenoscopy; [place] tooth extraction; and Hernia repair (Left, 08/14/2024).     Social History  He reports that he quit smoking about 2 months ago. His smoking use included cigarettes. He started smoking about 41 years ago. He has a 41.5 pack-year smoking history. He has quit using smokeless tobacco. He reports that he does not currently use alcohol. He reports current drug use. Drug: Marijuana.    Family History  Family History   Problem Relation Name Age of Onset   • Cancer Father     • Liver cancer Father's Brother     • Cancer Paternal Grandmother          Allergies  Erythromycin     Last Recorded Vitals  There were no vitals taken for this visit.    Physical Exam  General: no acute distress,  well-nourished, wheelchair  Eyes: intact EOM, no scleral icterus  ENT: hearing intact, no drainage  Respiratory: symmetric chest rise, no cough  Cardiovascular: intact distal pulses, no pitting edema  Abdominal: soft, nontender, nondistended  Musculoskeletal: no deformities, intact strength  Integumentary: warm, dry, no lymphadenopathy  Neuro: no focal deficits, sensation intact  Psych: normal mood and affect       Relevant Results  FINAL DIAGNOSIS   A. STOMACH, ANTRUM, BODY, INCISURA, BIOPSY:   -- Gastric antral- and oxyntic-type mucosa with mild reactive epithelial change and focal minimal acute and chronic inflammation.    -- No Helicobacter pylori organisms identified on routine stained slides.       B. ESOPHAGUS DISTAL BIOPSY:   -- Superficial strips of squamous esophageal mucosa with acute inflammation and reactive epithelial change.  -- Detached fragments of fibrinoinflammatory exudate (ulceration).   -- No intestinal metaplasia identified.    -- See note.     Note:  GMS stain is negative for fungal elements.  HSV immunohistochemical stain is negative in the cells of interest.       : Dr. RENETTA HERNDON GIST FNB:   -- Gastrointestinal stromal tumor (GIST).  See note.     Note:  Minute fragments of a spindle cell neoplasm are identified.  No mitotic figures or necrosis are appreciated on this limited biopsy specimen.       DOG-1, desmin, and S100 immunohistochemical stains are performed.  The neoplastic cell are positive for DOG-1; and negative for desmin and S100.  Overall, the morphologic and immunohistochemical staining profile support the above diagnosis.

## 2024-09-16 ENCOUNTER — OFFICE VISIT (OUTPATIENT)
Dept: GASTROENTEROLOGY | Facility: HOSPITAL | Age: 59
End: 2024-09-16
Payer: COMMERCIAL

## 2024-09-16 ENCOUNTER — APPOINTMENT (OUTPATIENT)
Dept: HEMATOLOGY/ONCOLOGY | Facility: HOSPITAL | Age: 59
End: 2024-09-16
Payer: COMMERCIAL

## 2024-09-16 VITALS
SYSTOLIC BLOOD PRESSURE: 129 MMHG | BODY MASS INDEX: 17 KG/M2 | HEIGHT: 65 IN | DIASTOLIC BLOOD PRESSURE: 88 MMHG | TEMPERATURE: 96.3 F | HEART RATE: 108 BPM | OXYGEN SATURATION: 98 % | WEIGHT: 102 LBS

## 2024-09-16 DIAGNOSIS — Z80.0 FHX: STOMACH CANCER: Primary | ICD-10-CM

## 2024-09-16 DIAGNOSIS — C49.A2 MALIGNANT GASTROINTESTINAL STROMAL TUMOR (GIST) OF STOMACH: ICD-10-CM

## 2024-09-16 PROCEDURE — 3008F BODY MASS INDEX DOCD: CPT | Performed by: PHYSICIAN ASSISTANT

## 2024-09-16 PROCEDURE — 99214 OFFICE O/P EST MOD 30 MIN: CPT | Performed by: PHYSICIAN ASSISTANT

## 2024-09-16 PROCEDURE — 3074F SYST BP LT 130 MM HG: CPT | Performed by: PHYSICIAN ASSISTANT

## 2024-09-16 PROCEDURE — 3079F DIAST BP 80-89 MM HG: CPT | Performed by: PHYSICIAN ASSISTANT

## 2024-09-16 ASSESSMENT — ENCOUNTER SYMPTOMS
HEMATURIA: 0
WEAKNESS: 0
EYE REDNESS: 0
CHOKING: 0
DYSPHORIC MOOD: 0
NAUSEA: 0
ABDOMINAL PAIN: 0
BLOOD IN STOOL: 0
TROUBLE SWALLOWING: 0
COUGH: 0
UNEXPECTED WEIGHT CHANGE: 0
AGITATION: 0
ACTIVITY CHANGE: 0
JOINT SWELLING: 0
CONFUSION: 0
VOMITING: 0
DIARRHEA: 0
SORE THROAT: 0
CONSTIPATION: 0

## 2024-09-16 ASSESSMENT — PAIN SCALES - GENERAL: PAINLEVEL: 10-WORST PAIN EVER

## 2024-09-16 NOTE — PROGRESS NOTES
Subjective   Patient ID: Joe Alaniz is a 59 y.o. male with h/o HTN, BPH, alcohol use, and h/o PUD with perforated ulcer in the pst that required surgical intervention was recently  dxd with GIST tumor.     Pt went to the hospital on 7/10/24 for a syncopal episode, multiple electrolyte abnormalties and failure to thrive.  A gastric mass with seen on CT abd/p which then led to pt undergoing an EGD with EUS on 7/12/24.    Patchy grade D esophagitis with multiple mucosal breaks measuring 5 mm or more, continuous between folds, covering 75% or more of the circumference in the lower third of the esophagus; performed cold forceps biopsy ( acute inflammation, no intestinal metaplasia, negative for fungal and HSV)  Homogeneous, hypoechoic, round and submucosal mass measuring 18 mm x 16 mm was visualized in the fundus of the stomach, contained within the muscularis propria;  A sample was sent for histology analysis. 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  Severe, generalized atrophic and erythematous mucosa in the fundus of the stomach, body of the stomach, antrum and prepyloric region, consistent with gastritis; performed cold forceps biopsy to rule out H. Pylori ( negative bx )  The pylorus appeared normal.  The duodenal bulb, 1st part of the duodenum and 2nd part of the duodenum appeared normal.        Pt then saw Hem/Onc as follow up and advised to undergo surgical resection and pending on pathology, he may require adjuvant therapy with imatinib.  The resection is scheduled in 10/1/24    Pt does have a scheduled Colonoscopy in 10/4/24 with Dr. Phan.    Currently pt is doing fairly well.        Review of Systems   Constitutional:  Negative for activity change and unexpected weight change.   HENT:  Negative for sore throat and trouble swallowing.    Eyes:  Negative for redness.   Respiratory:  Negative for cough and choking.    Cardiovascular:  Negative for chest pain.   Gastrointestinal:  Negative for  "abdominal pain, blood in stool, constipation, diarrhea, nausea and vomiting.   Genitourinary:  Negative for hematuria.   Musculoskeletal:  Negative for joint swelling.   Neurological:  Negative for weakness.   Psychiatric/Behavioral:  Negative for agitation, confusion and dysphoric mood.          Objective   Visit Vitals  /88   Pulse 108   Temp 35.7 °C (96.3 °F)   Ht 1.651 m (5' 5\")   Wt 46.3 kg (102 lb)   SpO2 98%   BMI 16.97 kg/m²   Smoking Status Former   BSA 1.46 m²      Physical Exam  Vitals reviewed.   Constitutional:       General: He is not in acute distress.     Appearance: He is not ill-appearing.   HENT:      Head: Normocephalic and atraumatic.      Mouth/Throat:      Mouth: Mucous membranes are moist.      Pharynx: No oropharyngeal exudate or posterior oropharyngeal erythema.   Eyes:      General: No scleral icterus.     Pupils: Pupils are equal, round, and reactive to light.   Cardiovascular:      Rate and Rhythm: Tachycardia present.      Heart sounds: Normal heart sounds.   Pulmonary:      Effort: Pulmonary effort is normal.      Breath sounds: Normal breath sounds. No wheezing or rhonchi.   Abdominal:      General: Bowel sounds are normal. There is no distension.      Palpations: Abdomen is soft.      Tenderness: There is no abdominal tenderness. There is no guarding or rebound.      Comments: Midline scar from previous surgery (perforated ulcer repair when he was younger )   Musculoskeletal:         General: No swelling or deformity.      Cervical back: Neck supple.   Lymphadenopathy:      Cervical: No cervical adenopathy.   Skin:     General: Skin is warm.      Coloration: Skin is not jaundiced.   Neurological:      General: No focal deficit present.      Mental Status: He is alert.   Psychiatric:         Mood and Affect: Mood normal.         Behavior: Behavior normal.         Thought Content: Thought content normal.       Assessment/Plan       1) GIST tumor (<2cm) in fundus of stomach -  Pt " scheduled for a resection in Oct 2024.  Will push back his screening colonoscopy , at least 3 months after surgery.   With family h/o stomach and possible pancreatic ca, we will refer pt to genetic counseling.      More recs to follow after screening colonoscopy completed

## 2024-09-16 NOTE — PATIENT INSTRUCTIONS
Please call [phone] to reschedule your screening colonoscopy approx 3 months after your stomach surgery.    Please call central scheduling to schedule a consult with genetic counselor regarding your family h/o GI cancers (stomach, pancreas ?)

## 2024-09-19 DIAGNOSIS — D21.4 BENIGN GASTROINTESTINAL STROMAL TUMOR (GIST): Primary | ICD-10-CM

## 2024-10-01 ENCOUNTER — TELEPHONE (OUTPATIENT)
Dept: HEMATOLOGY/ONCOLOGY | Facility: HOSPITAL | Age: 59
End: 2024-10-01
Payer: COMMERCIAL

## 2024-10-01 NOTE — TELEPHONE ENCOUNTER
Pasquale Gu RN case manager is calling - the patient is about to be homeless and has no where to go.    She is asking if here are resources for cancer pts.      He is not having new health issues.      The patient does know Pasquale is reaching out.      Adding Catie Marie as she originally saw this patient.    Please advise    Message sent

## 2024-10-02 ENCOUNTER — SOCIAL WORK (OUTPATIENT)
Dept: CASE MANAGEMENT | Facility: HOSPITAL | Age: 59
End: 2024-10-02
Payer: COMMERCIAL

## 2024-10-02 NOTE — PROGRESS NOTES
"Sw received In Basket message from Dr. MENDY Muhammad. Pt's [facility] , Pasquale Gu ([phone]) reached out via the nursing line. She informed the medical team that the Pt is experiencing housing insecurity and will be homeless soon. Sw contacted Pt via phone to assess. Pt reported living in a rental property for the last 6 years. During a recent hospitalization, his landlord lost possession of the property due to failure to pay property taxes. The property was purchased by a new individual who informed all the tenants they are no longer able to reside there. Pt's lease ended in July and has not renewed, he has had a verbal month to month agreement with the previous landlord. Pt has a court date 10/3 to determine if he is able to continue residing there. Sw advised Pt to contact [facility] ([phone]), 211, and provided contact information for [facility] triage and intake department ([phone]). Pt verbalized understanding and expressed appreciation for Sw contact/ Assistance. Pt requests call back in a few days to provide Sw with a housing update. No other needs identified at this time.    Pt provided verbal consent for Sw to contact [facility], Pasquale Gu, to provide update. Sw contacted CM via phone to provide update and inform of resources provided. CM verbalized appreciation of update. CM to connect with [facility] \"social determinants of health team\" to determine if there are additional resources they are able to provide.      SW to be available as needed.       Dania Fung, MSW, LSW    "

## 2024-10-04 ENCOUNTER — APPOINTMENT (OUTPATIENT)
Dept: GASTROENTEROLOGY | Facility: HOSPITAL | Age: 59
End: 2024-10-04
Payer: COMMERCIAL

## 2024-10-15 ENCOUNTER — OFFICE VISIT (OUTPATIENT)
Dept: NEUROLOGY | Facility: CLINIC | Age: 59
End: 2024-10-15
Payer: COMMERCIAL

## 2024-10-15 ENCOUNTER — PHARMACY VISIT (OUTPATIENT)
Dept: PHARMACY | Facility: CLINIC | Age: 59
End: 2024-10-15
Payer: MEDICAID

## 2024-10-15 VITALS
HEIGHT: 66 IN | SYSTOLIC BLOOD PRESSURE: 130 MMHG | RESPIRATION RATE: 16 BRPM | BODY MASS INDEX: 20.41 KG/M2 | HEART RATE: 78 BPM | TEMPERATURE: 97 F | WEIGHT: 127 LBS | DIASTOLIC BLOOD PRESSURE: 90 MMHG

## 2024-10-15 DIAGNOSIS — G62.9 NEUROPATHY: Primary | ICD-10-CM

## 2024-10-15 PROCEDURE — 3075F SYST BP GE 130 - 139MM HG: CPT | Performed by: STUDENT IN AN ORGANIZED HEALTH CARE EDUCATION/TRAINING PROGRAM

## 2024-10-15 PROCEDURE — 3008F BODY MASS INDEX DOCD: CPT | Performed by: STUDENT IN AN ORGANIZED HEALTH CARE EDUCATION/TRAINING PROGRAM

## 2024-10-15 PROCEDURE — RXMED WILLOW AMBULATORY MEDICATION CHARGE

## 2024-10-15 PROCEDURE — 3080F DIAST BP >= 90 MM HG: CPT | Performed by: STUDENT IN AN ORGANIZED HEALTH CARE EDUCATION/TRAINING PROGRAM

## 2024-10-15 PROCEDURE — 99205 OFFICE O/P NEW HI 60 MIN: CPT | Performed by: STUDENT IN AN ORGANIZED HEALTH CARE EDUCATION/TRAINING PROGRAM

## 2024-10-15 RX ORDER — SENNOSIDES 25 MG/1
1 TABLET, FILM COATED ORAL EVERY 6 HOURS PRN
Qty: 30 G | Refills: 3 | Status: SHIPPED | OUTPATIENT
Start: 2024-10-15 | End: 2024-11-14

## 2024-10-15 RX ORDER — TRAMADOL HYDROCHLORIDE 50 MG/1
50 TABLET ORAL 3 TIMES DAILY PRN
Qty: 90 TABLET | Refills: 0 | Status: SHIPPED | OUTPATIENT
Start: 2024-10-15 | End: 2024-11-14

## 2024-10-15 ASSESSMENT — PATIENT HEALTH QUESTIONNAIRE - PHQ9
SUM OF ALL RESPONSES TO PHQ9 QUESTIONS 1 AND 2: 0
2. FEELING DOWN, DEPRESSED OR HOPELESS: NOT AT ALL
1. LITTLE INTEREST OR PLEASURE IN DOING THINGS: NOT AT ALL

## 2024-10-15 ASSESSMENT — ENCOUNTER SYMPTOMS
OCCASIONAL FEELINGS OF UNSTEADINESS: 0
LOSS OF SENSATION IN FEET: 0
DEPRESSION: 0

## 2024-10-15 ASSESSMENT — PAIN SCALES - GENERAL: PAINLEVEL: 2

## 2024-10-15 NOTE — PATIENT INSTRUCTIONS
Get your blood and urine drawn at a  lab.    Schedule your EMG with me as soon as possible with my office staff.     Call to let me know if the pain medicine is working.       You can reach us at our office phone: [phone].     Derick Healy MD  Neurology and Neuromuscular Medicine   [facility]  [facility]   [address]  [address] [place] [address]

## 2024-10-15 NOTE — PROGRESS NOTES
Neurology/Neuromuscular Consult     Joe Alaniz MRN: [id number], : 1965  Reason for Referral: Pain (Feet pains )  Referring Provider: Adriano Chung MD  Primary Care Physician: Nilo Ayon DO     History of Present Illness:    Mr. Alaniz is a 59 y.o. 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 It is painful on the bottom, not the top of the foot. It has progressed to mid ankle.  The left foot started with the same symptoms in Sep 2024 (1 month ago). Toes are weak.     Any touch to the foot is severely painful.     He is going to have surgery soon on the     No dizziness, LH.  No syncope or passing out.  PO intake was poor, but getting better, eating multiple meals now.   Lost ~50 lbs very quickly. Gained 27 lbs back. He does not cross legs.   No bladder or bowel problems.     He has tried and failed multiple medications including Tylenol, NSAIDs, topical ointments, gabapentin, amitriptyline.  He remains in severe pain.    Relevant past medical, surgical, family, and social histories, along with ROS was reviewed and pertinent details noted above.     Past Medical History:   Diagnosis Date    BPH (benign prostatic hyperplasia)     Dental caries     Esophagitis determined by endoscopy     Failure to thrive in adult     Former smoker     Gastritis     GERD (gastroesophageal reflux disease)     GIST, malignant (Multi)     HTN (hypertension)     PUD (peptic ulcer disease)     Syncope     Unintentional weight loss      Past Surgical History:   Procedure Laterality Date    ESOPHAGOGASTRODUODENOSCOPY      gastric mass biopsy    HERNIA REPAIR Left 2024    left inguinal hernia repair with dr hernandez    SMALL INTESTINE SURGERY      PUD w/ perforated ulcer, s/p surgical resection    WISDOM TOOTH EXTRACTION       Family History   Problem Relation  "Name Age of Onset    Cancer Father      Liver cancer Father's Brother      Cancer Paternal Grandmother       Social History     Tobacco Use    Smoking status: Former     Current packs/day: 0.00     Average packs/day: 1 pack/day for 41.5 years (41.5 ttl pk-yrs)     Types: Cigarettes     Start date:      Quit date: 2024     Years since quittin.2    Smokeless tobacco: Never   Substance Use Topics    Alcohol use: Not Currently     Comment: Former etoh use, ~1/2 pint of liquor per week      Allergies   Allergen Reactions    Erythromycin Hives        Medications:    Current Outpatient Medications:     amLODIPine (Norvasc) 10 mg tablet, Take 1 tablet (10 mg) by mouth once daily., Disp: 90 tablet, Rfl: 0    Floranex 1 million cell tablet tablet, Take 1 tablet by mouth early in the morning.., Disp: , Rfl:     ibuprofen 600 mg tablet, Take 1 tablet (600 mg) by mouth every 6 hours if needed for pain., Disp: , Rfl:     lidocaine (Xylocaine) 5 % cream cream, Apply 1 Application topically every 6 hours if needed (to painful areas on feet)., Disp: 30 g, Rfl: 3    pantoprazole (ProtoNix) 40 mg EC tablet, Take 1 tablet (40 mg) by mouth 2 times a day before meals. Do not crush, chew, or split., Disp: 60 tablet, Rfl: 0    polyethylene glycol (GoLYTELY) 236-22.74-6.74 -5.86 gram solution, Use as Directed (Patient not taking: Reported on 2024), Disp: 4000 mL, Rfl: 0    traMADol (Ultram) 50 mg tablet, Take 1 tablet (50 mg) by mouth 3 times a day as needed for severe pain (7 - 10)., Disp: 90 tablet, Rfl: 0       Physical Exam:   /90 (BP Location: Right arm, Patient Position: Sitting, BP Cuff Size: Adult)   Pulse 78   Temp 36.1 °C (97 °F) (Temporal)   Resp 16   Ht 1.676 m (5' 6\")   Wt 57.6 kg (127 lb)   BMI 20.50 kg/m²      Neurological Exam:  General: NAD, cooperative   Neuro:  Awake alert, language normal, no dysarthria    EOM full range, herman 3-4 mm  Smile sym  Tongue midline   Strength 5/5 throughout "   Bulk is diffusely reduced, markedly reduced bulk in the bilateral legs from the knee down.  Reflexes:      R          L  BR:               2          2  Biceps:         2          2  Triceps:        2          2  Knee:           3          3  Ankle:          0          0  *Difficult to assess toes due to severe pain on plantar surface of feet  There is length-dependent reduction in the right worse than left legs starting from mid calf into the foot to temperature, and pinprick from the ankle down.  There is superimposed allodynia to any touch over the plantar worsening dorsal surface of the bilateral feet.  FTN normal  Gait: Limited assessment, he walks with his feet rolled on the lateral surface to avoid putting pressure on his plantar foot, due to extreme pain.      Impression/Plan:   Joe Alaniz is a 59 y.o. male presents with asymmetric subacute onset neuropathy over the last 3 months which has progressed from the right foot and now to the left in the last month.  He has extreme pain associated with any touch consistent with allodynia.  Additionally there is stocking pattern of reduced sensation in small and large fiber nerves.  His gait is significantly impaired however this is likely due to altered mechanics from pain in the feet.  Reflexes are absent only in the ankles.    Overall, this is a concerning presentation given the rapidity and asymmetric onset of his symptoms.  Given close association with recent cancer diagnosis, a paraneoplastic neuropathy is considered however would be distinctly unusual from a GIST tumor, only few case reports can be found on this associated with CRMP5 antibodies.  Other considerations are with extreme weight loss and failure to thrive, a multivitamin deficiency state is also possible.  Will test for vitamins and minerals not yet tested.  Regardless he will need an EMG to further assess.  Given the extreme nature of his pain and functional impairment, will write him for a  short course of tramadol while we are sorting out his diagnosis.    Plan:  Problem List Items Addressed This Visit       Neuropathy - Primary    Relevant Medications    traMADol (Ultram) 50 mg tablet    lidocaine (Xylocaine) 5 % cream cream    Other Relevant Orders    EMG & nerve conduction    Methylmalonic Acid    Ceruloplasmin    Copper, Blood    Zinc, Serum or Plasma    Vitamin E    AARTI + ANA Panel    Celiac Panel    ANCA-Associated Vasculitis Profile (ANCA,MPO,PR3)    Encephalopathy-Autoimmune Evaluation,Serum    Drug Screen, Urine With Reflex to Confirmation    Cryoglobulin     Derick Healy MD  Neurology and Neuromuscular Medicine   [facility]  [facility] [place]          I personally reviewed OARRS report for the patient. No suspicious activity was found. Patient is continuing to benefit from receiving tramadol prescribed by me. Will continue to prescribe this medication.    Annually for any patient receiving a controlled medication:  - Urine tox screen pending   - Controlled substance agreement signed    - The patients need to be seen back every 3 months      The total appointment time today was 70 minutes. This time included preparing to see the patient, obtaining the history, performing a medically necessary appropriate physical examination, counseling and educating the patient/family, ordering tests, referring and communicating with other providers, independently interpreting results  to the patient/family and documenting clinical information in the medical record.

## 2024-10-23 ENCOUNTER — NUTRITION (OUTPATIENT)
Dept: HEMATOLOGY/ONCOLOGY | Facility: HOSPITAL | Age: 59
End: 2024-10-23
Payer: COMMERCIAL

## 2024-10-23 ENCOUNTER — ANESTHESIA EVENT (OUTPATIENT)
Dept: GASTROENTEROLOGY | Facility: HOSPITAL | Age: 59
End: 2024-10-23
Payer: COMMERCIAL

## 2024-10-23 NOTE — PROGRESS NOTES
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  CMN was signed and faxed back along with documents from recent office visits    Wt Readings from Last 10 Encounters:   10/15/24 57.6 kg (127 lb)   09/16/24 46.3 kg (102 lb)   09/06/24 45.8 kg (101 lb)   08/21/24 50.3 kg (111 lb)   08/17/24 47.6 kg (105 lb)   08/14/24 48 kg (105 lb 13.1 oz)   08/05/24 (!) 44.8 kg (98 lb 11.2 oz)   07/19/24 (!) 44.5 kg (98 lb)   07/12/24 (!) 44.5 kg (98 lb)   07/04/24 59 kg (130 lb)     Lab Results   Component Value Date    GLUCOSE 88 07/16/2024    CALCIUM 8.2 (L) 07/16/2024     07/16/2024    K 3.8 07/16/2024    CO2 23 07/16/2024     07/16/2024    BUN 7 07/16/2024    CREATININE 0.88 07/16/2024

## 2024-10-23 NOTE — ANESTHESIA PREPROCEDURE EVALUATION
Patient: Joe Alaniz    Procedure Information       Date/Time: 10/24/24 0800    Scheduled providers: Kerwin Cardozo MD; Suze Valiente MD    Procedure: EGD    Location: [facility]            Relevant Problems   Anesthesia (within normal limits)  No family hx MH      Cardiac   (+) HTN (hypertension)      Pulmonary (within normal limits)      Neuro   (+) Peripheral neuropathy (59 y.o. male presents with asymmetric subacute onset neuropathy over the last 3 months which has progressed from the right foot and now to the left in the last month.)      GI (within normal limits)      /Renal   (+) Kidney stones, calcium oxalate      Liver (within normal limits)      Endocrine (within normal limits)   (+) Failure to thrive in adult      Hematology (within normal limits)      Musculoskeletal (within normal limits)      HEENT (within normal limits)      ID (within normal limits)      Skin (within normal limits)      GYN (within normal limits)      Digestive   (+) GIST (gastrointestinal stroma tumor), malignant, colon       Clinical information reviewed:                 CONCLUSIONS: 7/2024     1. Left ventricular ejection fraction is normal, calculated by Aranda's biplane at 61%.   2. There is normal right ventricular global systolic function.   3. Mild aortic valve regurgitation.   4. RVSP within normal limits.   5. The inferior vena cava size appears small.  NPO Detail:  No data recorded     Physical Exam    Airway  Mallampati: II  TM distance: >3 FB     Cardiovascular    Dental   Comments: intact   Pulmonary    Abdominal          Vitals:    10/24/24 0735   BP: (!) 127/92   Pulse: 82   Resp: 17   Temp: 36.3 °C (97.3 °F)   SpO2: 100%       Past Surgical History:   Procedure Laterality Date    ESOPHAGOGASTRODUODENOSCOPY      gastric mass biopsy    HERNIA REPAIR Left 08/14/2024    left inguinal hernia repair with dr hernandez    SMALL INTESTINE SURGERY      PUD w/ perforated ulcer, s/p surgical resection    EDDIE  TOOTH EXTRACTION       Past Medical History:   Diagnosis Date    BPH (benign prostatic hyperplasia)     Dental caries     Esophagitis determined by endoscopy     Failure to thrive in adult     Former smoker     Gastritis     GERD (gastroesophageal reflux disease)     GIST, malignant (Multi)     HTN (hypertension)     PUD (peptic ulcer disease)     Syncope     Unintentional weight loss        Current Outpatient Medications:     amLODIPine (Norvasc) 10 mg tablet, Take 1 tablet (10 mg) by mouth once daily., Disp: 90 tablet, Rfl: 0    Floranex 1 million cell tablet tablet, Take 1 tablet by mouth early in the morning.., Disp: , Rfl:     lidocaine (Xylocaine) 5 % cream cream, Apply 1 Application topically every 6 hours if needed (to painful areas on feet)., Disp: 30 g, Rfl: 3    pantoprazole (ProtoNix) 40 mg EC tablet, Take 1 tablet (40 mg) by mouth 2 times a day before meals. Do not crush, chew, or split., Disp: 60 tablet, Rfl: 0    traMADol (Ultram) 50 mg tablet, Take 1 tablet (50 mg) by mouth 3 times a day as needed for severe pain (7 - 10)., Disp: 90 tablet, Rfl: 0    ibuprofen 600 mg tablet, Take 1 tablet (600 mg) by mouth every 6 hours if needed for pain. (Patient not taking: Reported on 10/24/2024), Disp: , Rfl:     polyethylene glycol (GoLYTELY) 236-22.74-6.74 -5.86 gram solution, Use as Directed (Patient not taking: Reported on 10/24/2024), Disp: 4000 mL, Rfl: 0  Prior to Admission medications    Medication Sig Start Date End Date Taking? Authorizing Provider   amLODIPine (Norvasc) 10 mg tablet Take 1 tablet (10 mg) by mouth once daily. 8/21/24 11/19/24 Yes Nilo Ayon, DO   Floranex 1 million cell tablet tablet Take 1 tablet by mouth early in the morning.. 9/25/23  Yes Historical Provider, MD   lidocaine (Xylocaine) 5 % cream cream Apply 1 Application topically every 6 hours if needed (to painful areas on feet). 10/15/24 11/14/24 Yes Derick Healy MD   pantoprazole (ProtoNix) 40 mg EC tablet Take 1 tablet  (40 mg) by mouth 2 times a day before meals. Do not crush, chew, or split. 24  Yes Wanda Ernandez MD   traMADol (Ultram) 50 mg tablet Take 1 tablet (50 mg) by mouth 3 times a day as needed for severe pain (7 - 10). 10/15/24 11/14/24 Yes Derick Healy MD   ibuprofen 600 mg tablet Take 1 tablet (600 mg) by mouth every 6 hours if needed for pain.  Patient not taking: Reported on 10/24/2024 7/19/24   Historical Provider, MD   polyethylene glycol (GoLYTELY) 236-22.74-6.74 -5.86 gram solution Use as Directed  Patient not taking: Reported on 10/24/2024 8/28/24   Sera MD Salvador     Allergies   Allergen Reactions    Erythromycin Hives     Social History     Tobacco Use    Smoking status: Former     Current packs/day: 0.00     Average packs/day: 1 pack/day for 41.5 years (41.5 ttl pk-yrs)     Types: Cigarettes     Start date:      Quit date: 2024     Years since quittin.2    Smokeless tobacco: Never   Substance Use Topics    Alcohol use: Not Currently     Comment: Former etoh use, ~1/2 pint of liquor per week         Chemistry    Lab Results   Component Value Date/Time     2024 0700    K 3.8 2024 0700     2024 0700    CO2 23 2024 0700    BUN 7 2024 0700    CREATININE 0.88 2024 0700    Lab Results   Component Value Date/Time    CALCIUM 8.2 (L) 2024 0700    ALKPHOS 67 07/10/2024 1400    AST 28 07/10/2024 1400    ALT 28 07/10/2024 1400    BILITOT 1.4 (H) 07/10/2024 1400          Lab Results   Component Value Date/Time    WBC 6.9 2024 0700    HGB 11.2 (L) 2024 0700    HCT 33.5 (L) 2024 0700     (H) 2024 0700     Lab Results   Component Value Date/Time    PROTIME 11.3 2024 0943    INR 1.0 2024 0943     Encounter Date: 07/10/24   ECG 12 lead   Result Value    Ventricular Rate 100    Atrial Rate 100    NH Interval 146    QRS Duration 84    QT Interval 352    QTC Calculation(Bazett) 454    P Axis 87    R Axis -35     T Axis 81    QRS Count 16    Q Onset 224    P Onset 151    P Offset 201    T Offset 400    QTC Fredericia 417    Narrative    Normal sinus rhythm  Biatrial enlargement  Left axis deviation  Low voltage QRS  Abnormal ECG  Confirmed by King Hopson ([id number]) on 7/23/2024 2:57:48 PM     No results found for this or any previous visit from the past 1095 days.     Anesthesia Plan    History of general anesthesia?: yes  History of complications of general anesthesia?: no    ASA 2     MAC     intravenous induction   Anesthetic plan and risks discussed with patient.  Use of blood products discussed with patient and sibling who.    Plan discussed with [facility]AA.

## 2024-10-24 ENCOUNTER — ANESTHESIA (OUTPATIENT)
Dept: GASTROENTEROLOGY | Facility: HOSPITAL | Age: 59
End: 2024-10-24
Payer: COMMERCIAL

## 2024-10-24 ENCOUNTER — HOSPITAL ENCOUNTER (OUTPATIENT)
Dept: GASTROENTEROLOGY | Facility: HOSPITAL | Age: 59
Discharge: HOME | End: 2024-10-24
Payer: COMMERCIAL

## 2024-10-24 VITALS
TEMPERATURE: 97.1 F | SYSTOLIC BLOOD PRESSURE: 150 MMHG | RESPIRATION RATE: 22 BRPM | DIASTOLIC BLOOD PRESSURE: 98 MMHG | HEART RATE: 74 BPM | OXYGEN SATURATION: 100 %

## 2024-10-24 DIAGNOSIS — D21.4 BENIGN GASTROINTESTINAL STROMAL TUMOR (GIST): ICD-10-CM

## 2024-10-24 PROCEDURE — 43254 EGD ENDO MUCOSAL RESECTION: CPT | Performed by: STUDENT IN AN ORGANIZED HEALTH CARE EDUCATION/TRAINING PROGRAM

## 2024-10-24 PROCEDURE — 2720000007 HC OR 272 NO HCPCS

## 2024-10-24 PROCEDURE — 43254 EGD ENDO MUCOSAL RESECTION: CPT | Performed by: INTERNAL MEDICINE

## 2024-10-24 PROCEDURE — 3700000001 HC GENERAL ANESTHESIA TIME - INITIAL BASE CHARGE

## 2024-10-24 PROCEDURE — 3700000002 HC GENERAL ANESTHESIA TIME - EACH INCREMENTAL 1 MINUTE

## 2024-10-24 PROCEDURE — 7100000010 HC PHASE TWO TIME - EACH INCREMENTAL 1 MINUTE

## 2024-10-24 PROCEDURE — 7100000009 HC PHASE TWO TIME - INITIAL BASE CHARGE

## 2024-10-24 PROCEDURE — 2500000004 HC RX 250 GENERAL PHARMACY W/ HCPCS (ALT 636 FOR OP/ED): Mod: SE

## 2024-10-24 PROCEDURE — 2500000004 HC RX 250 GENERAL PHARMACY W/ HCPCS (ALT 636 FOR OP/ED): Mod: SE | Performed by: ANESTHESIOLOGY

## 2024-10-24 RX ORDER — LIDOCAINE HCL/PF 100 MG/5ML
SYRINGE (ML) INTRAVENOUS AS NEEDED
Status: DISCONTINUED | OUTPATIENT
Start: 2024-10-24 | End: 2024-10-24

## 2024-10-24 RX ORDER — FENTANYL CITRATE 50 UG/ML
INJECTION, SOLUTION INTRAMUSCULAR; INTRAVENOUS AS NEEDED
Status: DISCONTINUED | OUTPATIENT
Start: 2024-10-24 | End: 2024-10-24

## 2024-10-24 RX ORDER — PROPOFOL 10 MG/ML
INJECTION, EMULSION INTRAVENOUS AS NEEDED
Status: DISCONTINUED | OUTPATIENT
Start: 2024-10-24 | End: 2024-10-24

## 2024-10-24 RX ORDER — LIDOCAINE HYDROCHLORIDE 10 MG/ML
0.1 INJECTION, SOLUTION EPIDURAL; INFILTRATION; INTRACAUDAL; PERINEURAL ONCE
Status: DISCONTINUED | OUTPATIENT
Start: 2024-10-24 | End: 2024-10-25 | Stop reason: HOSPADM

## 2024-10-24 RX ORDER — ONDANSETRON HYDROCHLORIDE 2 MG/ML
4 INJECTION, SOLUTION INTRAVENOUS ONCE AS NEEDED
Status: COMPLETED | OUTPATIENT
Start: 2024-10-24 | End: 2024-10-24

## 2024-10-24 RX ORDER — SODIUM CHLORIDE, SODIUM LACTATE, POTASSIUM CHLORIDE, CALCIUM CHLORIDE 600; 310; 30; 20 MG/100ML; MG/100ML; MG/100ML; MG/100ML
100 INJECTION, SOLUTION INTRAVENOUS CONTINUOUS
Status: DISCONTINUED | OUTPATIENT
Start: 2024-10-24 | End: 2024-10-25 | Stop reason: HOSPADM

## 2024-10-24 RX ORDER — ACETAMINOPHEN 325 MG/1
650 TABLET ORAL EVERY 4 HOURS PRN
Status: DISCONTINUED | OUTPATIENT
Start: 2024-10-24 | End: 2024-10-25 | Stop reason: HOSPADM

## 2024-10-24 ASSESSMENT — PAIN - FUNCTIONAL ASSESSMENT
PAIN_FUNCTIONAL_ASSESSMENT: 0-10

## 2024-10-24 ASSESSMENT — PAIN SCALES - GENERAL
PAINLEVEL_OUTOF10: 0 - NO PAIN

## 2024-10-24 NOTE — ANESTHESIA POSTPROCEDURE EVALUATION
Patient: Joe Alaniz    Procedure Summary       Date: 10/24/24 Room / Location: [facility]    Anesthesia Start: 0813 Anesthesia Stop: 0904    Procedure: EGD Diagnosis: Benign gastrointestinal stromal tumor (GIST)    Scheduled Providers: Kerwin Cardozo MD; Suze Valiente MD Responsible Provider: Suze Valiente MD    Anesthesia Type: MAC ASA Status: 2            Anesthesia Type: MAC    Vitals Value Taken Time   /92 10/24/24 0938   Temp 36.3 10/24/24 0938   Pulse 82 10/24/24 0938   Resp 17 10/24/24 0938   SpO2 100 10/24/24 0938       Anesthesia Post Evaluation    Patient location during evaluation: PACU  Patient participation: complete - patient participated  Level of consciousness: awake  Pain management: adequate  Airway patency: patent  Cardiovascular status: acceptable  Respiratory status: acceptable  Hydration status: acceptable  Postoperative Nausea and Vomiting: none        There were no known notable events for this encounter.

## 2024-10-24 NOTE — H&P
History Of Present Illness  Joe Alaniz is a 59 y.o. male presenting with PMH HTN and BPH, who presents for EGD for resection of GIST.     Past Medical History  Past Medical History:   Diagnosis Date    BPH (benign prostatic hyperplasia)     Dental caries     Esophagitis determined by endoscopy     Failure to thrive in adult     Former smoker     Gastritis     GERD (gastroesophageal reflux disease)     GIST, malignant (Multi)     HTN (hypertension)     PUD (peptic ulcer disease)     Syncope     Unintentional weight loss      Surgical History  Past Surgical History:   Procedure Laterality Date    ESOPHAGOGASTRODUODENOSCOPY      gastric mass biopsy    HERNIA REPAIR Left 08/14/2024    left inguinal hernia repair with dr hernandez    SMALL INTESTINE SURGERY      PUD w/ perforated ulcer, s/p surgical resection    WISDOM TOOTH EXTRACTION       Social History  He reports that he quit smoking about 3 months ago. His smoking use included cigarettes. He started smoking about 41 years ago. He has a 41.5 pack-year smoking history. He has never used smokeless tobacco. He reports that he does not currently use alcohol. He reports that he does not currently use drugs after having used the following drugs: Marijuana.    Family History  Family History   Problem Relation Name Age of Onset    Cancer Father      Liver cancer Father's Brother      Cancer Paternal Grandmother          Allergies  Allergies   Allergen Reactions    Erythromycin Hives     Review of Systems     Physical Exam  Constitutional:       General: He is not in acute distress.     Appearance: Normal appearance.   HENT:      Head: Normocephalic and atraumatic.   Eyes:      Extraocular Movements: Extraocular movements intact.      Conjunctiva/sclera: Conjunctivae normal.   Pulmonary:      Effort: Pulmonary effort is normal. No respiratory distress.   Abdominal:      General: There is no distension.      Palpations: Abdomen is soft.   Neurological:      Mental Status: He  is alert and oriented to person, place, and time.          Last Recorded Vitals  Blood pressure (!) 127/92, pulse 82, temperature 36.3 °C (97.3 °F), temperature source Temporal, resp. rate 17, SpO2 100%.    Assessment/Plan   Joe Alaniz is a 59 y.o. male presenting with PMH HTN and BPH, who presents for EGD for resection of GIST.     Derik Hdez MD

## 2024-10-25 ASSESSMENT — PAIN SCALES - GENERAL: PAINLEVEL_OUTOF10: 0 - NO PAIN

## 2024-11-14 ENCOUNTER — OFFICE VISIT (OUTPATIENT)
Dept: NEUROLOGY | Facility: CLINIC | Age: 59
End: 2024-11-14
Payer: COMMERCIAL

## 2024-11-14 ENCOUNTER — HOSPITAL ENCOUNTER (OUTPATIENT)
Facility: CLINIC | Age: 59
Discharge: HOME | End: 2024-11-14
Payer: COMMERCIAL

## 2024-11-14 DIAGNOSIS — G62.89 OTHER POLYNEUROPATHY: Primary | ICD-10-CM

## 2024-11-14 DIAGNOSIS — G62.9 NEUROPATHY: ICD-10-CM

## 2024-11-14 PROCEDURE — 95911 NRV CNDJ TEST 9-10 STUDIES: CPT | Performed by: STUDENT IN AN ORGANIZED HEALTH CARE EDUCATION/TRAINING PROGRAM

## 2024-11-14 PROCEDURE — RXMED WILLOW AMBULATORY MEDICATION CHARGE

## 2024-11-14 PROCEDURE — 95885 MUSC TST DONE W/NERV TST LIM: CPT | Performed by: STUDENT IN AN ORGANIZED HEALTH CARE EDUCATION/TRAINING PROGRAM

## 2024-11-14 PROCEDURE — 95886 MUSC TEST DONE W/N TEST COMP: CPT | Performed by: STUDENT IN AN ORGANIZED HEALTH CARE EDUCATION/TRAINING PROGRAM

## 2024-11-14 RX ORDER — SENNOSIDES 25 MG/1
1 TABLET, FILM COATED ORAL EVERY 6 HOURS PRN
Qty: 45 G | Refills: 11 | Status: SHIPPED | OUTPATIENT
Start: 2024-11-14 | End: 2024-12-14

## 2024-11-14 NOTE — PROGRESS NOTES
Neurology/Neuromuscular Follow Up     Joe Alaniz MRN: [id number], : 1965  Reason for Follow Up: No chief complaint on file.   Primary Care Physician: Nilo Ayon DO     History Of Present Illness:  Mr. Alaniz is a 59 y.o. male presents for follow up of numbness and tingling.     Since last visit, his condition is essentially unchanged. The lidocaine cream is working well for his neuropathic pain.       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 It is painful on the bottom, not the top of the foot. It has progressed to mid ankle.  The left foot started with the same symptoms in Sep 2024 (1 month ago). Toes are weak.    Any touch to the foot is severely painful.   He is going to have surgery soon on the   No dizziness, LH.  No syncope or passing out.  PO intake was poor, but getting better, eating multiple meals now.   Lost ~50 lbs very quickly. Gained 27 lbs back. He does not cross legs.   No bladder or bowel problems.       Relevant past medical, surgical, family, and social histories, along with ROS was reviewed and pertinent details noted above.     Medications and Allergies: Reviewed in EMR    Medications:    Current Outpatient Medications:     amLODIPine (Norvasc) 10 mg tablet, Take 1 tablet (10 mg) by mouth once daily., Disp: 90 tablet, Rfl: 0    Floranex 1 million cell tablet tablet, Take 1 tablet by mouth early in the morning.., Disp: , Rfl:     ibuprofen 600 mg tablet, Take 1 tablet (600 mg) by mouth every 6 hours if needed for pain. (Patient not taking: Reported on 10/24/2024), Disp: , Rfl:     lidocaine (Xylocaine) 5 % cream cream, Apply 1 Application topically every 6 hours if needed (to painful areas on feet)., Disp: 45 g, Rfl: 11    pantoprazole (ProtoNix) 40 mg EC tablet, Take 1 tablet (40 mg) by mouth 2 times a day before  meals. Do not crush, chew, or split., Disp: 60 tablet, Rfl: 0    polyethylene glycol (GoLYTELY) 236-22.74-6.74 -5.86 gram solution, Use as Directed (Patient not taking: Reported on 10/24/2024), Disp: 4000 mL, Rfl: 0    traMADol (Ultram) 50 mg tablet, Take 1 tablet (50 mg) by mouth 3 times a day as needed for severe pain (7 - 10)., Disp: 90 tablet, Rfl: 0    Physical Exam:  There were no vitals taken for this visit.     Neurological Exam (from 10/15/2024 listed for reference):  General: NAD, cooperative   Neuro:  Awake alert, language normal, no dysarthria    EOM full range, herman 3-4 mm  Smile sym  Tongue midline   Strength 5/5 throughout   Bulk is diffusely reduced, markedly reduced bulk in the bilateral legs from the knee down.  Reflexes:      R          L  BR:               2          2  Biceps:         2          2  Triceps:        2          2  Knee:           3          3  Ankle:          0          0  *Difficult to assess toes due to severe pain on plantar surface of feet  There is length-dependent reduction in the right worse than left legs starting from mid calf into the foot to temperature, and pinprick from the ankle down.  There is superimposed allodynia to any touch over the plantar worsening dorsal surface of the bilateral feet.  FTN normal  Gait: Limited assessment, he walks with his feet rolled on the lateral surface to avoid putting pressure on his plantar foot, due to extreme pain.    Results:    The following results, labs, and/or imaging were personally reviewed and are remarkable for:    See below      Assessment and Plan:  Joe Alaniz is a 59 y.o. male has a recently discovered untreated GIST tumor, as well as significant weight loss.  We are still undergoing evaluation for his severe asymmetric subacute onset neuropathy over the last 3 months which has progressed from the right foot and now to the left.  He has extreme pain associated with any touch consistent with allodynia.  Additionally  there is stocking pattern of reduced sensation in small and large fiber nerves.  His gait is significantly impaired however this is likely due to altered mechanics from pain in the feet.  Reflexes are absent only in the ankles.     Overall, this is a concerning presentation given the rapidity and asymmetric onset of his symptoms.  Given close association with recent cancer diagnosis, a paraneoplastic neuropathy is considered however would be distinctly unusual from a GIST tumor, only few case reports can be found on this associated with CRMP5 antibodies.  Other considerations are with extreme weight loss and failure to thrive, a multivitamin deficiency state is also possible.  Will test for vitamins and minerals not yet tested.   EMG performed today shows evidence suggestive of a mild generalized, sensory greater then motor, axonal, length-dependent polyneuropathy, notably by sural radial amplitude ratio.  Although small fiber neuropathy has not is still considered, there is large fiber axonal involvement necessitates further workup for causes of acute onset neuropathy.  There were no definite demyelinating features however conduction velocities are borderline demyelinating range, thus will conduct extensive evaluation.    Plan:  Problem List Items Addressed This Visit       Peripheral neuropathy - Primary    Relevant Medications    lidocaine (Xylocaine) 5 % cream cream     Other Visit Diagnoses       Neuropathy        Relevant Medications    lidocaine (Xylocaine) 5 % cream cream    Other Relevant Orders    Point of Care Neuromuscular US    MR lumbar spine w and wo IV contrast    Basic metabolic panel    Ganglioside (GM1/GD1b/GQ1b) Abs, IgG/IgM    MAG Dual Antigen Autoantibody Test    Vascular Endothelial Growth Factor          Derick Healy MD  Neurology and Neuromuscular Medicine   [facility]  [facility] [place]         Medical decision making moderate  complexity.  The patient has a progressive neurologic disorder and continued exacerbation.  Extensive testing and analysis has been ordered.

## 2024-11-21 ENCOUNTER — APPOINTMENT (OUTPATIENT)
Dept: PRIMARY CARE | Facility: CLINIC | Age: 59
End: 2024-11-21
Payer: COMMERCIAL

## 2024-11-23 ENCOUNTER — PHARMACY VISIT (OUTPATIENT)
Dept: PHARMACY | Facility: CLINIC | Age: 59
End: 2024-11-23
Payer: MEDICAID

## 2024-12-10 ENCOUNTER — OFFICE VISIT (OUTPATIENT)
Dept: PRIMARY CARE | Facility: CLINIC | Age: 59
End: 2024-12-10
Payer: COMMERCIAL

## 2024-12-10 ENCOUNTER — PHARMACY VISIT (OUTPATIENT)
Dept: PHARMACY | Facility: CLINIC | Age: 59
End: 2024-12-10
Payer: MEDICAID

## 2024-12-10 ENCOUNTER — APPOINTMENT (OUTPATIENT)
Dept: PRIMARY CARE | Facility: CLINIC | Age: 59
End: 2024-12-10
Payer: COMMERCIAL

## 2024-12-10 ENCOUNTER — HOSPITAL ENCOUNTER (OUTPATIENT)
Dept: RADIOLOGY | Facility: HOSPITAL | Age: 59
Discharge: HOME | End: 2024-12-10
Payer: COMMERCIAL

## 2024-12-10 VITALS
SYSTOLIC BLOOD PRESSURE: 119 MMHG | BODY MASS INDEX: 17.43 KG/M2 | HEART RATE: 91 BPM | DIASTOLIC BLOOD PRESSURE: 81 MMHG | OXYGEN SATURATION: 98 % | WEIGHT: 108 LBS

## 2024-12-10 DIAGNOSIS — G62.89 OTHER POLYNEUROPATHY: ICD-10-CM

## 2024-12-10 DIAGNOSIS — I15.9 SECONDARY HYPERTENSION: Primary | ICD-10-CM

## 2024-12-10 DIAGNOSIS — G62.9 NEUROPATHY: ICD-10-CM

## 2024-12-10 DIAGNOSIS — K13.79 MOUTH SORES: ICD-10-CM

## 2024-12-10 DIAGNOSIS — Z23 NEED FOR PROPHYLACTIC VACCINATION AGAINST DIPHTHERIA AND TETANUS: ICD-10-CM

## 2024-12-10 DIAGNOSIS — R62.7 FAILURE TO THRIVE IN ADULT: ICD-10-CM

## 2024-12-10 DIAGNOSIS — I10 HYPERTENSION, UNSPECIFIED TYPE: ICD-10-CM

## 2024-12-10 PROCEDURE — 72158 MRI LUMBAR SPINE W/O & W/DYE: CPT

## 2024-12-10 PROCEDURE — 3079F DIAST BP 80-89 MM HG: CPT | Performed by: INTERNAL MEDICINE

## 2024-12-10 PROCEDURE — A9575 INJ GADOTERATE MEGLUMI 0.1ML: HCPCS | Mod: SE | Performed by: STUDENT IN AN ORGANIZED HEALTH CARE EDUCATION/TRAINING PROGRAM

## 2024-12-10 PROCEDURE — 72158 MRI LUMBAR SPINE W/O & W/DYE: CPT | Performed by: RADIOLOGY

## 2024-12-10 PROCEDURE — 2550000001 HC RX 255 CONTRASTS: Mod: SE | Performed by: STUDENT IN AN ORGANIZED HEALTH CARE EDUCATION/TRAINING PROGRAM

## 2024-12-10 PROCEDURE — 99213 OFFICE O/P EST LOW 20 MIN: CPT | Performed by: INTERNAL MEDICINE

## 2024-12-10 PROCEDURE — 3074F SYST BP LT 130 MM HG: CPT | Performed by: INTERNAL MEDICINE

## 2024-12-10 PROCEDURE — RXMED WILLOW AMBULATORY MEDICATION CHARGE

## 2024-12-10 RX ORDER — GADOTERATE MEGLUMINE 376.9 MG/ML
10 INJECTION INTRAVENOUS
Status: COMPLETED | OUTPATIENT
Start: 2024-12-10 | End: 2024-12-10

## 2024-12-10 RX ORDER — AMLODIPINE BESYLATE 10 MG/1
10 TABLET ORAL DAILY
Qty: 90 TABLET | Refills: 3 | Status: SHIPPED | OUTPATIENT
Start: 2024-12-10 | End: 2025-12-05

## 2024-12-10 RX ORDER — GABAPENTIN 100 MG/1
100 CAPSULE ORAL 3 TIMES DAILY
Qty: 90 CAPSULE | Refills: 0 | Status: SHIPPED | OUTPATIENT
Start: 2024-12-10 | End: 2025-01-09

## 2024-12-10 NOTE — PROGRESS NOTES
Subjective   Patient ID: Joe Alaniz is a 59 y.o. male who presents for Follow-up.    HPI     Patient is a 59-year-old male with past medical history of gastric mass showing GIST tumor hypertension PUD with perforated ulcer BPH left inguinal hernia status post incisional repair on 8/14/2024 who presents for follow-up to hypertension.  Last visit his blood pressure was not well-controlled.  He is now in the amlodipine.  Blood pressure today well-controlled.  No headache changes in vision orthopnea.    He has ongoing evaluation by neurology pending MRI of the lumbar spine.  Possible small fiber neuropathy.  He was placed on tramadol but he states it is not working looking for alternate agent      Review of Systems  Constitutional: No fever or chills  Cardiovascular: no chest pain, no palpitations and no syncope.   Respiratory: no cough, no shortness of breath during exertion and no shortness of breath at rest.   Gastrointestinal: no abdominal pain, no nausea and no vomiting.  Neuro: No Headache, no dizziness    Objective   /81   Pulse 91   Wt 49 kg (108 lb)   SpO2 98%   BMI 17.43 kg/m²     Physical Exam  Constitutional: Alert and in no acute distress. Well developed, well nourished  Head and Face: Head and face: Normal.    Cardiovascular: Heart rate and rhythm were normal, normal S1 and S2. No peripheral edema.   Pulmonary: No respiratory distress. Clear bilateral breath sounds.  Musculoskeletal: Gait and station: Normal. Muscle strength/tone: Normal.   Skin: Normal skin color and pigmentation, normal skin turgor, and no rash.    Psychiatric: Judgment and insight: Intact. Mood and affect: Normal.    Procedures    Lab Results   Component Value Date    WBC 6.9 07/16/2024    HGB 11.2 (L) 07/16/2024    HCT 33.5 (L) 07/16/2024     (H) 07/16/2024    CHOL 165 09/24/2023    TRIG 98 09/24/2023    HDL 77.5 09/24/2023    ALT 28 07/10/2024    AST 28 07/10/2024     07/16/2024    K 3.8 07/16/2024      07/16/2024    CREATININE 0.88 07/16/2024    BUN 7 07/16/2024    CO2 23 07/16/2024    TSH 1.13 07/11/2024    INR 1.0 07/12/2024    HGBA1C 5.8 (H) 07/10/2024       EMG & nerve conduction  Impression:    This is an abnormal study.  There is electrophysiologic evidence suggestive of a mild generalized, sensory greater then motor, axonal, length-dependent polyneuropathy.    The motor unit changes in the left lower extremity and first dorsal interosseous are most likely secondary to a polyneuropathy, however a superimposed right chronic L5 radiculopathy cannot be fully excluded.    Derick Healy MD  --------------------------------------------------------------------------------------------------------------------------------------------------------------------------------------------------------------------------------------------------------------  -----------------------------------------------------------------------------------------------------------------------------------------------------------------            Assessment/Plan   Problem List Items Addressed This Visit             ICD-10-CM    Failure to thrive in adult R62.7    HTN (hypertension) - Primary I10    Relevant Orders    Follow Up In [facility] - PCP - Established    Mouth sores K13.79    Peripheral neuropathy G62.9    Relevant Medications    gabapentin (Neurontin) 100 mg capsule     Other Visit Diagnoses         Codes    Need for prophylactic vaccination against diphtheria and tetanus     Z23

## 2024-12-10 NOTE — ASSESSMENT & PLAN NOTE
Better controlled on current medications.  No medication adjustments.  Continue amlodipine.  Refills provided.  Follow-up 6 months

## 2024-12-19 ENCOUNTER — APPOINTMENT (OUTPATIENT)
Dept: OPHTHALMOLOGY | Facility: CLINIC | Age: 59
End: 2024-12-19
Payer: COMMERCIAL

## 2024-12-30 ENCOUNTER — LAB (OUTPATIENT)
Dept: LAB | Facility: LAB | Age: 59
End: 2024-12-30
Payer: COMMERCIAL

## 2024-12-30 DIAGNOSIS — G62.9 NEUROPATHY: ICD-10-CM

## 2024-12-30 LAB
AMPHETAMINES UR QL SCN: ABNORMAL
ANION GAP SERPL CALC-SCNC: 17 MMOL/L (ref 10–20)
BARBITURATES UR QL SCN: ABNORMAL
BENZODIAZ UR QL SCN: ABNORMAL
BUN SERPL-MCNC: 11 MG/DL (ref 6–23)
BZE UR QL SCN: ABNORMAL
CALCIUM SERPL-MCNC: 9.8 MG/DL (ref 8.6–10.6)
CANNABINOIDS UR QL SCN: ABNORMAL
CENTROMERE B AB SER-ACNC: <0.2 AI
CERULOPLASMIN SERPL-MCNC: 29 MG/DL (ref 20–60)
CHLORIDE SERPL-SCNC: 102 MMOL/L (ref 98–107)
CHROMATIN AB SERPL-ACNC: <0.2 AI
CO2 SERPL-SCNC: 27 MMOL/L (ref 21–32)
CREAT SERPL-MCNC: 0.69 MG/DL (ref 0.5–1.3)
DSDNA AB SER-ACNC: <1 IU/ML
EGFRCR SERPLBLD CKD-EPI 2021: >90 ML/MIN/1.73M*2
ENA JO1 AB SER QL IA: <0.2 AI
ENA RNP AB SER IA-ACNC: 0.4 AI
ENA SCL70 AB SER QL IA: <0.2 AI
ENA SM AB SER IA-ACNC: <0.2 AI
ENA SM+RNP AB SER QL IA: <0.2 AI
ENA SS-A AB SER IA-ACNC: <0.2 AI
ENA SS-B AB SER IA-ACNC: <0.2 AI
FENTANYL+NORFENTANYL UR QL SCN: ABNORMAL
GLIADIN PEPTIDE IGA SER IA-ACNC: <1 U/ML
GLUCOSE SERPL-MCNC: 70 MG/DL (ref 74–99)
METHADONE UR QL SCN: ABNORMAL
OPIATES UR QL SCN: ABNORMAL
OXYCODONE+OXYMORPHONE UR QL SCN: ABNORMAL
PCP UR QL SCN: ABNORMAL
POTASSIUM SERPL-SCNC: 4.4 MMOL/L (ref 3.5–5.3)
RIBOSOMAL P AB SER-ACNC: <0.2 AI
SODIUM SERPL-SCNC: 142 MMOL/L (ref 136–145)
TTG IGA SER IA-ACNC: <1 U/ML

## 2024-12-30 PROCEDURE — 36415 COLL VENOUS BLD VENIPUNCTURE: CPT

## 2024-12-30 PROCEDURE — 83516 IMMUNOASSAY NONANTIBODY: CPT

## 2024-12-30 PROCEDURE — 82390 ASSAY OF CERULOPLASMIN: CPT

## 2024-12-30 PROCEDURE — 86235 NUCLEAR ANTIGEN ANTIBODY: CPT

## 2024-12-30 PROCEDURE — 86036 ANCA SCREEN EACH ANTIBODY: CPT

## 2024-12-30 PROCEDURE — 82595 ASSAY OF CRYOGLOBULIN: CPT

## 2024-12-30 PROCEDURE — 80048 BASIC METABOLIC PNL TOTAL CA: CPT

## 2024-12-30 PROCEDURE — 83520 IMMUNOASSAY QUANT NOS NONAB: CPT

## 2024-12-30 PROCEDURE — 84630 ASSAY OF ZINC: CPT

## 2024-12-30 PROCEDURE — 86225 DNA ANTIBODY NATIVE: CPT

## 2024-12-30 PROCEDURE — 86255 FLUORESCENT ANTIBODY SCREEN: CPT

## 2024-12-30 PROCEDURE — 80349 CANNABINOIDS NATURAL: CPT

## 2024-12-30 PROCEDURE — 84446 ASSAY OF VITAMIN E: CPT

## 2024-12-30 PROCEDURE — 86038 ANTINUCLEAR ANTIBODIES: CPT

## 2024-12-30 PROCEDURE — 86341 ISLET CELL ANTIBODY: CPT

## 2024-12-30 PROCEDURE — 82525 ASSAY OF COPPER: CPT

## 2024-12-30 PROCEDURE — 80307 DRUG TEST PRSMV CHEM ANLYZR: CPT

## 2024-12-30 PROCEDURE — 83921 ORGANIC ACID SINGLE QUANT: CPT

## 2024-12-31 ENCOUNTER — APPOINTMENT (OUTPATIENT)
Dept: OPHTHALMOLOGY | Facility: CLINIC | Age: 59
End: 2024-12-31
Payer: COMMERCIAL

## 2024-12-31 LAB
ANA PATTERN: ABNORMAL
ANA SER QL HEP2 SUBST: POSITIVE
ANA TITR SER IF: ABNORMAL {TITER}

## 2025-01-01 LAB
COPPER SERPL-MCNC: 112 UG/DL (ref 70–140)
GLIADIN PEPTIDE IGG SER IA-ACNC: <0.56 FLU (ref 0–4.99)
TTG IGG SER IA-ACNC: <0.82 FLU (ref 0–4.99)
ZINC SERPL-MCNC: 80.4 UG/DL (ref 60–120)

## 2025-01-02 LAB
A-TOCOPHEROL VIT E SERPL-MCNC: 7.3 MG/L (ref 5.5–18)
ANCA AB PATTERN SER IF-IMP: NORMAL
ANCA IGG TITR SER IF: NORMAL {TITER}
BETA+GAMMA TOCOPHEROL SERPL-MCNC: 1 MG/L (ref 0–6)
METHYLMALONATE SERPL-SCNC: <0.2 UMOL/L (ref 0–0.4)
MYELOPEROXIDASE AB SER-ACNC: 0 AU/ML (ref 0–19)
PROTEINASE3 AB SER-ACNC: 1 AU/ML (ref 0–19)
VEGF SERPL-MCNC: 26 PG/ML (ref 9–86)

## 2025-01-03 ENCOUNTER — APPOINTMENT (OUTPATIENT)
Dept: OPHTHALMOLOGY | Facility: CLINIC | Age: 60
End: 2025-01-03
Payer: COMMERCIAL

## 2025-01-03 LAB
CARBOXYTHC UR-MCNC: >500 NG/ML
GD1B GANGL IGG SER IA-ACNC: 6 IV (ref 0–50)
GD1B GANGL IGM SER IA-ACNC: 4 IV (ref 0–50)
GM1 GANGL IGG SER IA-ACNC: 12 IV (ref 0–50)
GM1 GANGL IGM SER IA-ACNC: 10 IV (ref 0–50)
GQ1B GANGL IGG SER IA-ACNC: 3 IV (ref 0–50)
GQ1B GANGL IGM SER IA-ACNC: 4 IV (ref 0–50)

## 2025-01-04 LAB — CRYOGLOB SER QL: NORMAL

## 2025-01-06 LAB
AMPAR2 IGG SERPL QL CBA IFA: NEGATIVE
AMPHIPHYSIN IGG SER QL IA: NEGATIVE
ANNOTATION COMMENT IMP: NORMAL
CASPR2 IGG SER QL CBA IFA: NEGATIVE
CV2 AB SERPL QL IF: NEGATIVE
DPPX IGG SER QL CBA IFA: NEGATIVE
GABABR IGG SERPL QL CBA IFA: NEGATIVE
GAD65 AB SER-SCNC: 0.02 NMOL/L
GFAP ALPHA IGG SER QL IF: NEGATIVE
GLIAL NUC TYPE 1 AB SER QL IF: NEGATIVE
HU1 AB SER QL: NEGATIVE
HU2 AB SER QL IF: NEGATIVE
HU3 AB SER QL: NEGATIVE
IGLON5 IGG SER QL CBA IFA: NEGATIVE
IMMUNOLOGIST REVIEW: NORMAL
LGI1 IGG SER QL CBA IFA: NEGATIVE
MGLUR1 IGG SER QL IF: NEGATIVE
NEUROCHONDRIN AB SERPL QL IF: NEGATIVE
NIF IGG SER QL IF: NEGATIVE
NMDAR1 IGG SER QL CBA IFA: NEGATIVE
PCA-1 AB SER QL IF: NEGATIVE
PCA-2 AB SER QL IF: NEGATIVE
PCA-TR AB SER QL IF: NEGATIVE
PDE10A AB IFA,S: NEGATIVE
SEPTIN-7 IGG SERPL QL IF: NEGATIVE
TRIM46 AB IFA,S: NEGATIVE

## 2025-01-09 ENCOUNTER — TELEPHONE (OUTPATIENT)
Dept: NEUROLOGY | Facility: CLINIC | Age: 60
End: 2025-01-09
Payer: COMMERCIAL

## 2025-01-09 LAB — SCAN RESULT: NORMAL

## 2025-01-14 NOTE — TELEPHONE ENCOUNTER
Left a message with them.  Lets try to get in touch with him and figure out when is a good time to call back.

## 2025-01-17 ENCOUNTER — TELEPHONE (OUTPATIENT)
Dept: NEUROLOGY | Facility: HOSPITAL | Age: 60
End: 2025-01-17
Payer: COMMERCIAL

## 2025-01-17 NOTE — TELEPHONE ENCOUNTER
Pt returned call back to the office. He said he is available all day today to call him back or anytime. Stated if he saw it was the office number he would make sure he answered. Stated best number is  [phone].

## 2025-01-28 ENCOUNTER — APPOINTMENT (OUTPATIENT)
Dept: GASTROENTEROLOGY | Facility: HOSPITAL | Age: 60
End: 2025-01-28
Payer: COMMERCIAL

## 2025-01-28 ENCOUNTER — ANESTHESIA EVENT (OUTPATIENT)
Dept: GASTROENTEROLOGY | Facility: HOSPITAL | Age: 60
End: 2025-01-28

## 2025-01-28 ENCOUNTER — ANESTHESIA (OUTPATIENT)
Dept: GASTROENTEROLOGY | Facility: HOSPITAL | Age: 60
End: 2025-01-28
Payer: COMMERCIAL

## 2025-01-29 ENCOUNTER — APPOINTMENT (OUTPATIENT)
Dept: NEUROLOGY | Facility: HOSPITAL | Age: 60
End: 2025-01-29
Payer: COMMERCIAL

## 2025-01-29 ENCOUNTER — APPOINTMENT (OUTPATIENT)
Dept: NEUROLOGY | Facility: CLINIC | Age: 60
End: 2025-01-29
Payer: COMMERCIAL

## 2025-01-30 DIAGNOSIS — C49.A2 MALIGNANT GASTROINTESTINAL STROMAL TUMOR (GIST) OF STOMACH: Primary | ICD-10-CM

## 2025-01-31 ENCOUNTER — TELEPHONE (OUTPATIENT)
Dept: NEUROLOGY | Facility: CLINIC | Age: 60
End: 2025-01-31

## 2025-02-04 DIAGNOSIS — R26.9 GAIT DISORDER: ICD-10-CM

## 2025-02-04 DIAGNOSIS — G62.9 NEUROPATHY: Primary | ICD-10-CM

## 2025-02-12 ENCOUNTER — PROCEDURE VISIT (OUTPATIENT)
Dept: NEUROLOGY | Facility: HOSPITAL | Age: 60
End: 2025-02-12
Payer: COMMERCIAL

## 2025-02-12 ENCOUNTER — OFFICE VISIT (OUTPATIENT)
Dept: NEUROLOGY | Facility: HOSPITAL | Age: 60
End: 2025-02-12
Payer: COMMERCIAL

## 2025-02-12 DIAGNOSIS — G62.89 OTHER POLYNEUROPATHY: Primary | ICD-10-CM

## 2025-02-12 DIAGNOSIS — G60.3 IDIOPATHIC PROGRESSIVE NEUROPATHY: Primary | ICD-10-CM

## 2025-02-12 PROCEDURE — 76882 US LMTD JT/FCL EVL NVASC XTR: CPT | Performed by: STUDENT IN AN ORGANIZED HEALTH CARE EDUCATION/TRAINING PROGRAM

## 2025-02-12 PROCEDURE — 76882 US LMTD JT/FCL EVL NVASC XTR: CPT | Mod: RT | Performed by: STUDENT IN AN ORGANIZED HEALTH CARE EDUCATION/TRAINING PROGRAM

## 2025-02-12 PROCEDURE — RXMED WILLOW AMBULATORY MEDICATION CHARGE

## 2025-02-12 PROCEDURE — 99215 OFFICE O/P EST HI 40 MIN: CPT | Performed by: STUDENT IN AN ORGANIZED HEALTH CARE EDUCATION/TRAINING PROGRAM

## 2025-02-12 PROCEDURE — 76883 US NRV&ACC STRUX 1XTR COMPRE: CPT | Performed by: STUDENT IN AN ORGANIZED HEALTH CARE EDUCATION/TRAINING PROGRAM

## 2025-02-12 RX ORDER — PREDNISONE 10 MG/1
TABLET ORAL
Qty: 57 TABLET | Refills: 0 | Status: SHIPPED | OUTPATIENT
Start: 2025-02-12 | End: 2025-02-26

## 2025-02-12 RX ORDER — GABAPENTIN 100 MG/1
300 CAPSULE ORAL 3 TIMES DAILY
Qty: 270 CAPSULE | Refills: 11 | Status: SHIPPED | OUTPATIENT
Start: 2025-02-12 | End: 2026-02-12

## 2025-02-14 ENCOUNTER — PHARMACY VISIT (OUTPATIENT)
Dept: PHARMACY | Facility: CLINIC | Age: 60
End: 2025-02-14
Payer: MEDICAID

## 2025-02-17 NOTE — PROGRESS NOTES
Performed by: Derick Healy MD  Authorized by: Derick Healy MD        NEUROMUSCULAR ULTRASOUND OF THE RIGHT UPPER EXTREMITY and limited right lower extremity    INDICATION:  Clinical Information: He has 7 to 8 months of subacute onset painful numbness and tingling of the right worse than left feet up to the ankle.  This study is to evaluate for evidence of hypertrophic neuropathy.    HEIGHT: 5 ft 6 in  WEIGHT: 131 lbs.  HANDEDNESS: Right    COMPARISON:  None.    TECHNIQUE:  The examination was performed using a [employer] P9 ultrasound machine with a 15-6 MHz matrix linear transducer. Both axial and longitudinal views were obtained. Cross sectional area (CSA) was measured within the epineurium, using the trace method. At locations where repeat measurements were taken, the mean value is reported below. Real-time cine imaging was obtained.    The right median nerve and accompanying structures were studied throughout their entire anatomic course in the limb from the wrist to the axilla, including real-time cine imaging. The patient was examined supine with the arm extended and supinated.     The right brachial plexus was examin the right lower extremity was studied ne muscles. The trunks were followed to the supraclavicular brachial plexus as well.     The right lower extremity was studied.  The sural and superficial peroneal nerves were identified in the lower leg.  The plantar fascia was studied.    REFERENCE VALUES:  Please see PACS images and worksheet for the specific nerve measurements including CSA, diameter, and/or ratios for this study.    Nerve Measurement Site: Normal CSA (mm2)   Median distal wrist crease NL < 10; borderline 10-13; ABN > 13   Median palm (distal carpal tunnel) NL < 10; borderline 10-13; ABN > 13   Median mid-forearm NL < 10   Median antecubital fossa NL < 14   Median mid-arm NL < 14   Median axilla NL < 10        Ulnar wrist NL < 10    Ulnar mid-forearm NL < 10   Ulnar cubital tunnel NL <  10; ABN - mild 10-15; mod 15-20; sev > 20   Ulnar retrocondylar groove NL < 10; ABN - mild 10-15; mod 15-20; sev > 20   Ulnar mid-arm NL < 10   Ulnar axilla NL < 10       Radial antecubital fossa NL < 14   Radial below spiral groove NL < 14   Radial adjacent to the humerus NL < 13   Radial proximal to spiral groove NL < 13   Radial axilla NL < 13    Radial deep branch/PIN CSA NL < 3.4   Radial deep branch/PIN AP Diameter* NL < 1.6 mm*   Radial superficial sensory (radial styloid) NL < 4        Musculocutaneous axilla NL < 12   Musculocutaneous proximal humerus NL < 5-6   Lateral antebrachial cutaneous (LAC) Variable ~ NL < 3-4       Upper trunk of brachial plexus NL < 9   Middle trunk of brachial plexus NL < 9   Lower trunk of brachial plexus NL < 9       *This value represents a measurement of distance, not CSA, but is listed for easy of reference.     Nerve Measurement Site Ratio Normal Ratio   Median wrist flattening ratio < 3   Median wrist / mid-forearm CSA ratio (WFR) NL < 1.5; borderline 1.5 - 2.0   Ulnar elbow / mid-forearm CSA ratio NL < 1.5   Ulnar elbow / mid-arm CSA ratio NL < 1.5   Radial humerus / antecubital fossa CSA ratio NL < 1.5   Radial humerus / mid-arm CSA ratio NL < 1.5     FINDINGS:  The right median nerve at the wrist is borderline and CSA at 10.9 mm², along with abnormal flattening ratio 4.2.  The right median nerve in the forearm is normal.  With abnormal ratio.  There is no focal notch sign however there is tapering of the median nerve beneath a thickened transverse carpal ligament.  There is no abnormal Doppler signal, no anatomic variant noted.  Muscles of the thenar eminence note mild hyperechogenic changes in the superficial and deep thenar muscles.  The median nerve is otherwise normal through its course to the axilla.  The forearm musculature shows mild hyperechogenic changes.    The right brachial plexus was then studied.  The roots and trunks were identified.  Upper trunk measures  a CSA of 7.2 mm², the right middle trunk 7.8 mm², which are normal, however the lower trunk is 10.8 mm² which is borderline but may be due to technical factors such as angle of insonation.    The lower extremities were then studied.  The sural and superficial sensory nerves could not be identified behind the hyperechogenic changes of the lower extremity musculature.  The right plantar fascia was studied and was normal in size without focal enlargement or hypo-echogenic change.      IMPRESSION:  There is ultrasound evidence of borderline to mild right median neuropathy at the wrist.  The median nerve was studied through its course to the axilla and was otherwise normal.    There is no definite evidence of diffuse or focal hypertrophic neuropathy.  However the diffuse hyperechogenic changes seen in most muscles is consistent with denervation atrophy as would be seen with a diffuse or proximal neurogenic process.    Study of the right lower extremity was limited.  However there is no evidence of plantar fasciitis in the right foot.

## 2025-02-19 NOTE — PROGRESS NOTES
Neurology/Neuromuscular Follow Up     Joe Alaniz MRN: [id number], : 1965  Reason for Follow Up: No chief complaint on file.  Primary Care Physician: Nilo Ayon DO     History Of Present Illness:  Mr. Alaniz is a 59 y.o. male presents for follow up of idiopathic axonal polyneuropathy.     Since last visit,     He continues to have progression of his symptoms.  The right foot and toes remain significantly worse than the left however both feet are still extremely sensitive to touch.      To recap, in 2024, during the hospitalization for stomach cancer GIST (gastrointestinal stroma tumor), he noted right foot digits 3-5 is numb. It is painful on the bottom, not the top of the foot. It has progressed to mid ankle.  The left foot started with the same symptoms in Sep 2024 (1 month ago). Toes are weak.    Any touch to the foot is severely painful.   He is going to have surgery soon on the   No dizziness, LH.  No syncope or passing out.  PO intake was poor, but getting better, eating multiple meals now.   Lost ~50 lbs very quickly. Gained 27 lbs back. He does not cross legs.   No bladder or bowel problems.       Relevant past medical, surgical, family, and social histories, along with ROS was reviewed and pertinent details noted above.     Medications and Allergies: Reviewed in EMR    Medications:    Current Outpatient Medications:     amLODIPine (Norvasc) 10 mg tablet, Take 1 tablet (10 mg) by mouth once daily., Disp: 90 tablet, Rfl: 3    Floranex 1 million cell tablet tablet, Take 1 tablet by mouth early in the morning.., Disp: , Rfl:     gabapentin (Neurontin) 100 mg capsule, Take 3 capsules (300 mg) by mouth 3 times a day., Disp: 270 capsule, Rfl: 11    pantoprazole (ProtoNix) 40 mg EC tablet, Take 1 tablet (40 mg) by mouth 2 times a day before meals. Do not crush, chew, or split., Disp: 60 tablet, Rfl: 0    predniSONE (Deltasone) 10 mg tablet, Take 6 tablets (60 mg) by mouth once daily for 7 days, THEN  5 tablets (50 mg) once daily for 1 day, THEN 4 tablets (40 mg) once daily for 1 day, THEN 3 tablets (30 mg) once daily for 1 day, THEN 2 tablets (20 mg) once daily for 1 day, THEN 1 tablet (10 mg) once daily for 1 day., Disp: 57 tablet, Rfl: 0    Physical Exam:  There were no vitals taken for this visit.     Neurological Exam (from 10/15/2024 listed for reference):  General: NAD, cooperative   Neuro:  Awake alert, language normal, no dysarthria    EOM full range, herman 3-4 mm  Smile sym  Tongue midline   Strength 5/5 throughout   Bulk is diffusely reduced, markedly reduced bulk in the bilateral legs from the knee down.  Reflexes:      R          L  BR:               2          2  Biceps:         2          2  Triceps:        2          2  Knee:           3          3  Ankle:          0          0  *Difficult to assess toes due to severe pain on plantar surface of feet  There is length-dependent reduction in the right worse than left legs starting from mid calf into the foot to temperature, and pinprick from the ankle down.  There is superimposed allodynia to any touch over the plantar worsening dorsal surface of the bilateral feet.  FTN normal  Gait: Limited assessment, he walks with his feet rolled on the lateral surface to avoid putting pressure on his plantar foot, due to extreme pain.    Results:    The following results, labs, and/or imaging were personally reviewed and are remarkable for: See below    Neuromuscular ultrasound was performed today, there is no evidence of focal or diffuse hypertrophic neuropathy.  There is diffuse denervation atrophy consistent with neuropathy.  Additionally is a borderline right median neuropathy at the wrist.      Assessment and Plan:  Joe Alaniz is a 59 y.o. male has a recently discovered untreated GIST tumor, as well as significant weight loss.  We are still undergoing evaluation for his severe asymmetric subacute onset neuropathy over the last 3 months which has  progressed from the right foot and now to the left.  He has extreme pain associated with any touch consistent with allodynia.  Additionally there is stocking pattern of reduced sensation in small and large fiber nerves.  His gait is significantly impaired however this is likely due to altered mechanics from pain in the feet.  Reflexes are absent only in the ankles.    EMG shows evidence suggestive of a mild generalized, sensory greater then motor, axonal, length-dependent polyneuropathy, notably by sural radial amplitude ratio.  Although small fiber neuropathy has not is still considered, there is large fiber axonal involvement necessitates further workup for causes of acute onset neuropathy.  There were no definite demyelinating features however conduction velocities are borderline demyelinating range, thus will conduct extensive evaluation.     Overall, this is a concerning presentation given the rapidity and asymmetric onset of his symptoms.  Given close association with recent cancer diagnosis, a paraneoplastic neuropathy is considered however autoimmune antibodies have been negative, lumbar MRI shows no root enhancement, and ultrasound shows no nerve enlargement.  Regardless he remains in significant pain, so we will trial a course of steroids along with gabapentin and topicals.    Plan:  Problem List Items Addressed This Visit       Peripheral neuropathy - Primary   Trial of prednisone 60 mg followed by taper -> if response will plan next steps for further evaluation and ongoing treatment  Gabapentin 300 mg 3 times daily, taper up as tolerated for neuropathic pain  Continue with topical lidocaine or cream as useful      Derick Healy MD  Neurology and Neuromuscular Medicine   [facility]  [facility] [place]         Medical decision making moderate complexity.  The patient has a progressive neurologic disorder and continued exacerbation.  Extensive  testing and analysis has been ordered.

## 2025-03-19 ENCOUNTER — APPOINTMENT (OUTPATIENT)
Dept: OPHTHALMOLOGY | Facility: CLINIC | Age: 60
End: 2025-03-19
Payer: COMMERCIAL

## 2025-03-20 ENCOUNTER — APPOINTMENT (OUTPATIENT)
Dept: PRIMARY CARE | Facility: CLINIC | Age: 60
End: 2025-03-20
Payer: COMMERCIAL

## 2025-04-03 ENCOUNTER — APPOINTMENT (OUTPATIENT)
Dept: GASTROENTEROLOGY | Facility: HOSPITAL | Age: 60
End: 2025-04-03
Payer: COMMERCIAL

## 2025-04-20 NOTE — PROGRESS NOTES
Patient ID: Joe Alaniz is a 59 y.o. male.  Referring Physician: Richelle Luevano, APRN-CNP  [id number] [place] [address]  Primary Care Provider: DO Marquis Bales    Joe Alaniz is a 59 y.o. male presenting with PMH HTN and BPH    On 7/10/24 he resented to the ED with syncopal event, nausea vomiting, diarrhea and unintentional weight loss.  CT showed 2 cm x 1.5 cm exophytic mass in the posterior wall of the gastric fundus, concerning for neoplasm.  On 7/12/24: EGD showed grade D esophagitis and gross gastritis, also found to homogeneous hypoechoic rounded submucosal mass measuring 18 mm x 16 mm in the fundus of the stomach.  Biopsies were taken.  Pathology of gastric mass biopsy showed GI stromal tumor.  DOG-1, desmin, and S100 immunohistochemical stains were performed.  The neoplastic cell are positive for DOG-1; and negative for desmin and S100.  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   No mitotic figures or necrosis are appreciated on the limited biopsy specimen.  He was seen by oncology and surgery was recommended.  He presented on 10/24/24 for EGD for resection of GIST by Dr. Ginger Cardozo.   At EGD a single 15 mm submucosal nodule was visualized in the body of the stomach; removed by EMR. 2 polyloops were used to successfully strangulate the nodule using a 2 channel gastroscope and use of rat tooth forceps to pull the nodule into the polyloop. The plan was to repeat the EGD in 3 months for followup.       In addition he has had significant weight loss and he is undergoing evaluation for his severe asymmetric subacute onset neuropathy over the last 3 months which has progressed from the right foot and now to the left.  He has extreme pain associated with any touch consistent with allodynia.  Additionally there is stocking pattern of reduced sensation in small and large fiber nerves.  His gait is significantly impaired however  "this is likely due to altered mechanics from pain in the feet.  Reflexes are absent only in the ankles.  Overall, this is a concerning presentation given the rapidity and asymmetric onset of his symptoms.  For this he is being followed by Derick Helay MD from neurology. He was recently started on a  trial of prednisone 60 mg followed by taper -> if response will plan next steps for further evaluation and ongoing treatment. Gabapentin 300 mg 3 times daily, taper up as tolerated for neuropathic pain and to continue with topical lidocaine or cream as useful        Review of Systems - Oncology     Objective   BSA: There is no height or weight on file to calculate BSA.  There were no vitals taken for this visit.    Family History[1]    ROS: negative according to the 14 point ROS unless stated in the HPI    Joe Alaniz  reports that he quit smoking about 9 months ago. His smoking use included cigarettes. He started smoking about 42 years ago. He has a 41.5 pack-year smoking history. He has never used smokeless tobacco.  He  reports that he does not currently use alcohol.  He  reports that he does not currently use drugs after having used the following drugs: Marijuana.    Allergies: erythromycin    Physical Exam    Performance Status:  {ECOG performance status:16372}    Assessment/Plan      59 year with small (15 mm) gastric GIST and no mitotic figures noted on FNA.  The patient had this removed by EGD and EMR (essentially a ligation procedure).   No other sites of disease were noted.  The patient has undergone an essentially curative procedure for a very small gastric GIST.  He was due to have a repeat EGD to ensure the the small lesion has been completely removed.   but this has not been performed. If the EGD is negative the chance of recurrence is very low.  There is no role for adjuvant imatinb for what would be considered a \"low-risk\" GIST, though the full mitotic index can not be calculated at the lesion was " not excised in toto.   A question was raised whether his neuropathy is any way related to this very small GIST lesion that has been treated.  In my opinion there is no relationship and if there was his symptoms should have improved with removal of the small primary and it has not (especially in the absence of metastatic disease),  In addtion, autoimmune antibodies have been negative, lumbar MRI shows no root enhancement, and ultrasound shows no nerve enlargement.     At this point in the absence of disease and with such a small GIST primary there is no need for oncological followup.  We would be glad to see the patient back in one years time.  He does need the repeat EGD to confirm that the GIST lesion has been eradicated.     PLAN  Repeat EGD with Dr. Cardozo  2.   RTC in one year, with restaging CT scan  3.   Neurology followup    Khari Pompa MD                                [1]   Family History  Problem Relation Name Age of Onset    Cancer Father      Liver cancer Father's Brother      Cancer Paternal Grandmother

## 2025-04-21 ENCOUNTER — APPOINTMENT (OUTPATIENT)
Dept: HEMATOLOGY/ONCOLOGY | Facility: HOSPITAL | Age: 60
End: 2025-04-21
Payer: COMMERCIAL

## 2025-04-21 DIAGNOSIS — D21.4 BENIGN GASTROINTESTINAL STROMAL TUMOR (GIST): Primary | ICD-10-CM

## 2025-05-10 NOTE — PROGRESS NOTES
Patient ID: Joe Alaniz is a 59 y.o. male.  Referring Physician: Richelle Luevano, APRN-CNP  [address] [place] [address]  Primary Care Provider: DO Marquis Bales    Joe Alaniz is a 59 y.o. male presenting with PMH HTN and BPH    On 7/10/24 he resented to the ED with syncopal event, nausea vomiting, diarrhea and unintentional weight loss.  CT showed 2 cm x 1.5 cm exophytic mass in the posterior wall of the gastric fundus, concerning for neoplasm.  On 7/12/24: EGD showed grade D esophagitis and gross gastritis, also found to homogeneous hypoechoic rounded submucosal mass measuring 18 mm x 16 mm in the fundus of the stomach.  Biopsies were taken.  Pathology of gastric mass biopsy showed GI stromal tumor.  DOG-1, desmin, and S100 immunohistochemical stains were performed.  The neoplastic cell are positive for DOG-1; and negative for desmin and S100.  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   No mitotic figures or necrosis are appreciated on the limited biopsy specimen.  He was seen by oncology and surgery was recommended.  He presented on 10/24/24 for EGD for resection of GIST by Dr. Ginger Cardozo.   At EGD a single 15 mm submucosal nodule was visualized in the body of the stomach; removed by EMR. 2 polyloops were used to successfully strangulate the nodule using a 2 channel gastroscope and use of rat tooth forceps to pull the nodule into the polyloop. The plan was to repeat the EGD in 3 months for followup.       In addition he has had significant weight loss and he is undergoing evaluation for his severe asymmetric subacute onset neuropathy over the last 3 months which has progressed from the right foot and now to the left.  He has extreme pain associated with any touch consistent with allodynia.  Additionally there is stocking pattern of reduced sensation in small and large fiber nerves.  His gait is significantly impaired however  "this is likely due to altered mechanics from pain in the feet.  Reflexes are absent only in the ankles.  Overall, this is a concerning presentation given the rapidity and asymmetric onset of his symptoms.  For this he is being followed by Derick Healy MD from neurology. He was recently started on a  trial of prednisone 60 mg followed by taper -> if response will plan next steps for further evaluation and ongoing treatment. Gabapentin 300 mg 3 times daily, taper up as tolerated for neuropathic pain and to continue with topical lidocaine or cream as useful        Review of Systems - Oncology     Objective   BSA: There is no height or weight on file to calculate BSA.  There were no vitals taken for this visit.    Family History[1]    ROS: negative according to the 14 point ROS unless stated in the HPI    Joe Alaniz  reports that he quit smoking about 9 months ago. His smoking use included cigarettes. He started smoking about 42 years ago. He has a 41.5 pack-year smoking history. He has never used smokeless tobacco.  He  reports that he does not currently use alcohol.  He  reports that he does not currently use drugs after having used the following drugs: Marijuana.    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  The patient had this removed by EGD and EMR (essentially a ligation procedure).   No other sites of disease were noted.  The patient has undergone an essentially curative procedure for a very small gastric GIST.  He was due to have a repeat EGD to ensure the the small lesion has been completely removed.   but this has not been performed. If the EGD is negative the chance of recurrence is very low.  There is no role for adjuvant imatinb for what would be considered a \"low-risk\" GIST, though the full mitotic index can not be calculated at the lesion was " not excised in toto.   A question was raised whether his neuropathy is any way related to this very small GIST lesion that has been treated.  In my opinion there is no relationship and if there was his symptoms should have improved with removal of the small primary and it has not (especially in the absence of metastatic disease),  In addtion, autoimmune antibodies have been negative, lumbar MRI shows no root enhancement, and ultrasound shows no nerve enlargement.     At this point in the absence of disease and with such a small GIST primary there is no need for oncological followup.  We would be glad to see the patient back in one years time.  He does need the repeat EGD to confirm that the GIST lesion has been eradicated.     PLAN  Repeat EGD with Dr. Cardozo  2.   RTC in one year, with restaging CT scan  3.   Neurology followup    Khari Popma MD                                [1]   Family History  Problem Relation Name Age of Onset    Cancer Father      Liver cancer Father's Brother      Cancer Paternal Grandmother

## 2025-05-12 ENCOUNTER — APPOINTMENT (OUTPATIENT)
Dept: HEMATOLOGY/ONCOLOGY | Facility: HOSPITAL | Age: 60
End: 2025-05-12
Payer: COMMERCIAL

## 2025-06-01 DIAGNOSIS — Z12.11 COLON CANCER SCREENING: Primary | ICD-10-CM

## 2025-06-01 RX ORDER — POLYETHYLENE GLYCOL 3350, SODIUM CHLORIDE, SODIUM BICARBONATE, POTASSIUM CHLORIDE 420; 11.2; 5.72; 1.48 G/4L; G/4L; G/4L; G/4L
4000 POWDER, FOR SOLUTION ORAL ONCE
Qty: 4000 ML | Refills: 0 | Status: SHIPPED | OUTPATIENT
Start: 2025-06-01 | End: 2025-06-02

## 2025-06-03 ENCOUNTER — TELEPHONE (OUTPATIENT)
Dept: GASTROENTEROLOGY | Facility: HOSPITAL | Age: 60
End: 2025-06-03
Payer: COMMERCIAL

## 2025-06-10 ENCOUNTER — APPOINTMENT (OUTPATIENT)
Dept: PRIMARY CARE | Facility: CLINIC | Age: 60
End: 2025-06-10
Payer: COMMERCIAL

## 2025-06-13 ENCOUNTER — APPOINTMENT (OUTPATIENT)
Dept: GASTROENTEROLOGY | Facility: HOSPITAL | Age: 60
End: 2025-06-13
Payer: COMMERCIAL

## 2025-06-13 NOTE — PROGRESS NOTES
Patient ID: Joe Alaniz is a 59 y.o. male.  Referring Physician: Richelle Luevano, APRN-CNP  [address] [place] [address]  Primary Care Provider: Nilo Ayon DO    Elements from the note dated 8/5/24 have been reviewed and updated where appropriate and all reflect current assessment and medical decision making from today's encounter.    Cancer History:  7/10/24: Presented to the ED with syncopal event, nausea vomiting, diarrhea and unintentional weight loss.  CT showed 2 cm x 1.5 cm exophytic mass in the posterior wall of the gastric fundus concerning for neoplasm.  7/12/24: EGD showed grade D esophagitis and gross gastritis, also found to homogeneous hypoechoic rounded submucosal mass measuring 18 mm x 16 mm in the fundus of the stomach.  Biopsies were taken.  Pathology of gastric mass biopsy showed GI stromal tumor.  No mitotic figures or necrosis are appreciated on the limited biopsy specimen.  8/5/24: Clinic visit to Dr. Muhammad; recommended up front surgical resection +/- adjuvant imatinib based on pathology findings.    Referral placed to Surgical Oncology. However, patient was subsequently lost to follow-up. It appears he was at risk of homelessness in Oct 2024.    PMH: BPH, HTN, peptic ulcer disease. Reports bleeding perforated ulcer in his stomach when he was 11 years old and required a surgery, inguinal hernia    FH:  Father: liver cancer  Paternal grandmother: stomach cancer  Paternal uncle x2: unknown type of cancer  Paternal cousin: lung cancer    SH: +tobacco use, quit smoking approx 3 weeks ago, +ETOH, no illicits    Subjective:   ***    No fevers, chills, chest pain, shortness of breath, abdominal pain, nausea, vomiting, diarrhea, or rashes.    ROS as above. Remainder of 10-point review of systems elicited and negative.    Objective:  There were no vitals taken for this visit.    PE:  Gen: A&O, NAD  Head: Normocephalic, atraumatic  Eyes: no scleral icterus  ENT: mucous membranes  moist, no oropharyngeal lesions  Resp: Lungs CTAB  Cardiac: Normal rate, regular rhythm, no murmurs appreciated  Abdomen: Soft, nondistended, nontender, +BS  Neuro: CNII-XII grossly intact  Psych: appropriate mood & affect  Skin: warm, dry, no apparent rashes     CT Chest angio for PE 7/10/24:  1. No evidence of acute pulmonary embolism.  2. Mild upper lung predominant emphysema.  3. 4 mm left upper lobe nodule as described above.  Instructions:  No further follow-up is required, however, if the  patient has high risk factors for primary lung malignancy, follow-up  noncontrast CT scan chest in 12 months may be obtained. (Manuel Pope et al., Guidelines for management of incidental pulmonary  nodules detected on CT images: From the Fleischner Society 2017,  Radiology. 2017 Jul;284 (1):228-243.) [email or web address]  4. Patulous esophagus with debris and ingested material. Correlate  with motility disorder and reflux.  5. Partially imaged fat density lesion in the left flank, likely a  lipoma.    CT Abd/pelvis 7/10/24:  1.  A 2.0 x 1.5 cm exophytic mass within the posterior wall of the  gastric fundus, concerning for underlying neoplasm, possibly  gastrointestinal stromal tumor (GIST). Recommend further evaluation  via endoscopy.  2. Redemonstration of circumferential mural thickening of the distal  esophagus with patulous esophagus, concerning for esophagitis.  3. Extensive sigmoid colonic diverticulosis without evidence of acute  diverticulitis.  4. Interval decrease in size of small bowel containing left inguinal  hernia compared to 07/02/2024 CT. No evidence bowel wall thickening  or obstruction.  5. Additional chronic findings as described above    Assessment & Plan:   Joe Alaniz is a 59 y.o. male with localized GIST of the gastric fundus diagnosed 7/12/2024. Gastrointestinal stromal tumors are the most common GI mesenchymal tumors; they arise from the intestinal cells of Cajal and can be identified  anywhere along the digestive tract. 55% of cases have stomach primary (as is found here). First-line treatment for localized GIST is surgical resection. Prognosis is calculated with an MSKCC nomogram that takes into account primary site, tumor size, and mitotic index (per post-op pathology). This patient's stomach primary and small tumor size to 2 cm were good prognostic indicators. Unfortunately, he has been lost to follow-up for considerable time. We will need to re-stage his tumor with scans and confirm that disease remains localized. If so, we can proceed with resection, then assess if adjuvant TKI treatment is indicated per risk factors.    PLAN  - re-staging scan: CT Abd/Pelvis w/ IV contrast  - consider Surgical Oncology referral if tumor remains surgically amenable  - follow-up pathology results; determine if TKI is indicated per MSKCC nomogram  - RTC in 6 weeks  - consider Social Work consult    Tj Osuna MD  Medicine-Pediatrics, PGY-3

## 2025-06-16 ENCOUNTER — APPOINTMENT (OUTPATIENT)
Dept: HEMATOLOGY/ONCOLOGY | Facility: HOSPITAL | Age: 60
End: 2025-06-16
Payer: COMMERCIAL

## 2025-06-22 NOTE — PROGRESS NOTES
Patient ID: Joe Alaniz is a 59 y.o. male.  Referring Physician: No referring provider defined for this encounter.  Primary Care Provider: Nilo Ayon DO    Elements from the note dated 8/5/24 have been reviewed and updated where appropriate and all reflect current assessment and medical decision making from today's encounter.    Cancer History:  7/10/24: Presented to the ED with syncopal event, nausea vomiting, diarrhea and unintentional weight loss.  CT showed 2 cm x 1.5 cm exophytic mass in the posterior wall of the gastric fundus concerning for neoplasm.  7/12/24: EGD showed grade D esophagitis and gross gastritis, also found to homogeneous hypoechoic rounded submucosal mass measuring 18 mm x 16 mm in the fundus of the stomach.  Biopsies were taken.  Pathology of gastric mass biopsy showed GI stromal tumor (GIST).  No mitotic figures or necrosis are appreciated on the limited biopsy specimen.  8/5/24: Clinic visit to Dr. Muhammad; recommended up front surgical resection +/- adjuvant imatinib based on pathology findings.    Referral placed to Surgical Oncology. However, patient was subsequently lost to follow-up.     6/23/25  He reports he had to leave [place] to take care of his sister.  He now RTC to resume care.  He denies any change in bowel habits, denies nausea, vomitng or blood loss.    PMH: BPH, HTN, peptic ulcer disease. Reports bleeding perforated ulcer in his stomach when he was 11 years old and required a surgery, inguinal hernia    FH:  Father: liver cancer  Paternal grandmother: stomach cancer  Paternal uncle x2: unknown type of cancer  Paternal cousin: lung cancer    SH: +tobacco use, celine a pack a week, +ETOH (weekends/social), no illicits    Subjective:   No fevers, chills, chest pain, shortness of breath, abdominal pain, nausea, vomiting, diarrhea, or rashes.    ROS as above. Remainder of 10-point review of systems elicited and negative.    Objective:  There were no vitals taken for this  visit.  Physical Activity: Insufficiently Active (6/23/2025)    Exercise Vital Sign     Days of Exercise per Week: 7 days     Minutes of Exercise per Session: 20 min   Physical Exam  Constitutional:       Appearance: Normal appearance.   HENT:      Head: Normocephalic.      Nose: Nose normal.      Mouth/Throat:      Mouth: Mucous membranes are moist.      Pharynx: Oropharynx is clear.   Eyes:      Extraocular Movements: Extraocular movements intact.      Pupils: Pupils are equal, round, and reactive to light.   Cardiovascular:      Rate and Rhythm: Normal rate and regular rhythm.      Pulses: Normal pulses.      Heart sounds: Normal heart sounds.   Pulmonary:      Effort: Pulmonary effort is normal.      Breath sounds: Normal breath sounds.   Abdominal:      General: Abdomen is flat.      Palpations: Abdomen is soft.          Comments: PUD surgery as a child (12) now with post-op scar.  No masses   Musculoskeletal:         General: Normal range of motion.      Cervical back: Normal range of motion and neck supple.   Skin:     General: Skin is warm and dry.   Neurological:      General: No focal deficit present.      Mental Status: He is alert and oriented to person, place, and time. Mental status is at baseline.   Psychiatric:         Mood and Affect: Mood normal.         Behavior: Behavior normal.         Thought Content: Thought content normal.         Judgment: Judgment normal.             CT Chest angio for PE 7/10/24:  1. No evidence of acute pulmonary embolism.  2. Mild upper lung predominant emphysema.  3. 4 mm left upper lobe nodule as described above.  Instructions:  No further follow-up is required, however, if the  patient has high risk factors for primary lung malignancy, follow-up  noncontrast CT scan chest in 12 months may be obtained. (Manuel Pope et al., Guidelines for management of incidental pulmonary  nodules detected on CT images: From the Fleischner Society 2017,  Radiology. 2017 Jul;284  (1):228-243.) [email or web address]  4. Patulous esophagus with debris and ingested material. Correlate  with motility disorder and reflux.  5. Partially imaged fat density lesion in the left flank, likely a  lipoma.    CT Abd/pelvis 7/10/24:  1.  A 2.0 x 1.5 cm exophytic mass within the posterior wall of the  gastric fundus, concerning for underlying neoplasm, possibly  gastrointestinal stromal tumor (GIST). Recommend further evaluation  via endoscopy.  2. Redemonstration of circumferential mural thickening of the distal  esophagus with patulous esophagus, concerning for esophagitis.  3. Extensive sigmoid colonic diverticulosis without evidence of acute  diverticulitis.  4. Interval decrease in size of small bowel containing left inguinal  hernia compared to 07/02/2024 CT. No evidence bowel wall thickening  or obstruction.  5. Additional chronic findings as described above    Assessment & Plan:   Joe Alaniz is a 59 y.o. male with localized GIST of the gastric fundus diagnosed 7/12/2024. Gastrointestinal stromal tumors are the most common GI mesenchymal tumors; they arise from the intestinal cells of Cajal and can be identified anywhere along the digestive tract. 55% of cases have stomach primary (as is found here). First-line treatment for localized GIST is surgical resection. Prognosis is calculated with an MSKCC nomogram that takes into account primary site, tumor size, and mitotic index (per post-op pathology). This patient's stomach primary and small tumor size to 2 cm were good prognostic indicators. Unfortunately, he has been lost to follow-up for considerable time. We will need to re-stage his tumor with scans and confirm that disease remains localized. If so, we can proceed with resection, then assess if adjuvant TKI treatment is indicated per risk factors including tumor size, grade (mitotic index), and location (stomach).  We reviewed this today.  He expressed that he will keep his appointments and was  in fact apologetic about his long absence. He says he was taking care of his sick sister in [place]. As I explained to him with the restaging workup I could not provide a specific treatment plan.  Once we have the scan we can than plan accordingly.      PLAN  - re-staging scan: CT Abd/Pelvis w/ IV contrast (ordered)  - consider Surgical Oncology referral if tumor remains surgically amenable  - follow-up pathology results; determine if TKI is indicated per MSKCC nomogram  - RTC in 4- 6 weeks  - CBC and comp as baseline  - consider Social Work consult    Khari Pompa MD  Director, [facility]

## 2025-06-23 ENCOUNTER — OFFICE VISIT (OUTPATIENT)
Dept: HEMATOLOGY/ONCOLOGY | Facility: HOSPITAL | Age: 60
End: 2025-06-23
Payer: COMMERCIAL

## 2025-06-23 ENCOUNTER — TELEPHONE (OUTPATIENT)
Dept: HEMATOLOGY/ONCOLOGY | Facility: HOSPITAL | Age: 60
End: 2025-06-23
Payer: COMMERCIAL

## 2025-06-23 VITALS
SYSTOLIC BLOOD PRESSURE: 143 MMHG | OXYGEN SATURATION: 99 % | RESPIRATION RATE: 14 BRPM | DIASTOLIC BLOOD PRESSURE: 92 MMHG | TEMPERATURE: 98.4 F | HEART RATE: 107 BPM | BODY MASS INDEX: 18.4 KG/M2 | WEIGHT: 114 LBS

## 2025-06-23 DIAGNOSIS — C49.A4 GIST (GASTROINTESTINAL STROMA TUMOR), MALIGNANT, COLON: Primary | ICD-10-CM

## 2025-06-23 PROCEDURE — 3080F DIAST BP >= 90 MM HG: CPT | Performed by: INTERNAL MEDICINE

## 2025-06-23 PROCEDURE — 3077F SYST BP >= 140 MM HG: CPT | Performed by: INTERNAL MEDICINE

## 2025-06-23 PROCEDURE — 99215 OFFICE O/P EST HI 40 MIN: CPT | Performed by: INTERNAL MEDICINE

## 2025-06-23 SDOH — HEALTH STABILITY: MENTAL HEALTH: HOW OFTEN DO YOU HAVE SIX OR MORE DRINKS ON ONE OCCASION?: NEVER

## 2025-06-23 SDOH — SOCIAL STABILITY: SOCIAL NETWORK
DO YOU BELONG TO ANY CLUBS OR ORGANIZATIONS SUCH AS CHURCH GROUPS, UNIONS, FRATERNAL OR ATHLETIC GROUPS, OR SCHOOL GROUPS?: NO

## 2025-06-23 SDOH — SOCIAL STABILITY: SOCIAL NETWORK
IN A TYPICAL WEEK, HOW MANY TIMES DO YOU TALK ON THE PHONE WITH FAMILY, FRIENDS, OR NEIGHBORS?: MORE THAN THREE TIMES A WEEK

## 2025-06-23 SDOH — HEALTH STABILITY: PHYSICAL HEALTH
HOW OFTEN DO YOU NEED TO HAVE SOMEONE HELP YOU WHEN YOU READ INSTRUCTIONS, PAMPHLETS, OR OTHER WRITTEN MATERIAL FROM YOUR DOCTOR OR PHARMACY?: NEVER

## 2025-06-23 SDOH — ECONOMIC STABILITY: FOOD INSECURITY: HOW HARD IS IT FOR YOU TO PAY FOR THE VERY BASICS LIKE FOOD, HOUSING, MEDICAL CARE, AND HEATING?: NOT VERY HARD

## 2025-06-23 SDOH — SOCIAL STABILITY: SOCIAL NETWORK: HOW OFTEN DO YOU ATTEND CHURCH OR RELIGIOUS SERVICES?: NEVER

## 2025-06-23 SDOH — SOCIAL STABILITY: SOCIAL INSECURITY: ARE YOU MARRIED, WIDOWED, DIVORCED, SEPARATED, NEVER MARRIED, OR LIVING WITH A PARTNER?: NEVER MARRIED

## 2025-06-23 SDOH — ECONOMIC STABILITY: INCOME INSECURITY: IN THE PAST 12 MONTHS HAS THE ELECTRIC, GAS, OIL, OR WATER COMPANY THREATENED TO SHUT OFF SERVICES IN YOUR HOME?: NO

## 2025-06-23 SDOH — HEALTH STABILITY: PHYSICAL HEALTH: ON AVERAGE, HOW MANY DAYS PER WEEK DO YOU ENGAGE IN MODERATE TO STRENUOUS EXERCISE (LIKE A BRISK WALK)?: 7 DAYS

## 2025-06-23 SDOH — HEALTH STABILITY: MENTAL HEALTH: HOW OFTEN DO YOU HAVE A DRINK CONTAINING ALCOHOL?: 2-4 TIMES A MONTH

## 2025-06-23 SDOH — ECONOMIC STABILITY: HOUSING INSECURITY: AT ANY TIME IN THE PAST 12 MONTHS, WERE YOU HOMELESS OR LIVING IN A SHELTER (INCLUDING NOW)?: NO

## 2025-06-23 SDOH — HEALTH STABILITY: MENTAL HEALTH: HOW MANY DRINKS CONTAINING ALCOHOL DO YOU HAVE ON A TYPICAL DAY WHEN YOU ARE DRINKING?: 1 OR 2

## 2025-06-23 SDOH — SOCIAL STABILITY: SOCIAL NETWORK: HOW OFTEN DO YOU ATTEND MEETINGS OF THE CLUBS OR ORGANIZATIONS YOU BELONG TO?: NEVER

## 2025-06-23 SDOH — ECONOMIC STABILITY: HOUSING INSECURITY: IN THE LAST 12 MONTHS, WAS THERE A TIME WHEN YOU WERE NOT ABLE TO PAY THE MORTGAGE OR RENT ON TIME?: NO

## 2025-06-23 SDOH — SOCIAL STABILITY: SOCIAL INSECURITY: WITHIN THE LAST YEAR, HAVE YOU BEEN AFRAID OF YOUR PARTNER OR EX-PARTNER?: NO

## 2025-06-23 SDOH — HEALTH STABILITY: PHYSICAL HEALTH: ON AVERAGE, HOW MANY MINUTES DO YOU ENGAGE IN EXERCISE AT THIS LEVEL?: 20 MIN

## 2025-06-23 SDOH — ECONOMIC STABILITY: FOOD INSECURITY: WITHIN THE PAST 12 MONTHS, THE FOOD YOU BOUGHT JUST DIDN'T LAST AND YOU DIDN'T HAVE MONEY TO GET MORE.: NEVER TRUE

## 2025-06-23 SDOH — SOCIAL STABILITY: SOCIAL NETWORK: HOW OFTEN DO YOU GET TOGETHER WITH FRIENDS OR RELATIVES?: MORE THAN THREE TIMES A WEEK

## 2025-06-23 SDOH — SOCIAL STABILITY: SOCIAL INSECURITY
WITHIN THE LAST YEAR, HAVE YOU BEEN RAPED OR FORCED TO HAVE ANY KIND OF SEXUAL ACTIVITY BY YOUR PARTNER OR EX-PARTNER?: NO

## 2025-06-23 SDOH — SOCIAL STABILITY: SOCIAL INSECURITY: WITHIN THE LAST YEAR, HAVE YOU BEEN HUMILIATED OR EMOTIONALLY ABUSED IN OTHER WAYS BY YOUR PARTNER OR EX-PARTNER?: NO

## 2025-06-23 SDOH — ECONOMIC STABILITY: TRANSPORTATION INSECURITY: IN THE PAST 12 MONTHS, HAS LACK OF TRANSPORTATION KEPT YOU FROM MEDICAL APPOINTMENTS OR FROM GETTING MEDICATIONS?: NO

## 2025-06-23 SDOH — ECONOMIC STABILITY: HOUSING INSECURITY: IN THE PAST 12 MONTHS, HOW MANY TIMES HAVE YOU MOVED WHERE YOU WERE LIVING?: 0

## 2025-06-23 SDOH — SOCIAL STABILITY: SOCIAL INSECURITY
WITHIN THE LAST YEAR, HAVE YOU BEEN KICKED, HIT, SLAPPED, OR OTHERWISE PHYSICALLY HURT BY YOUR PARTNER OR EX-PARTNER?: NO

## 2025-06-23 SDOH — ECONOMIC STABILITY: FOOD INSECURITY: WITHIN THE PAST 12 MONTHS, YOU WORRIED THAT YOUR FOOD WOULD RUN OUT BEFORE YOU GOT THE MONEY TO BUY MORE.: NEVER TRUE

## 2025-06-23 ASSESSMENT — PATIENT HEALTH QUESTIONNAIRE - PHQ9
2. FEELING DOWN, DEPRESSED OR HOPELESS: NOT AT ALL
SUM OF ALL RESPONSES TO PHQ9 QUESTIONS 1 AND 2: 0
1. LITTLE INTEREST OR PLEASURE IN DOING THINGS: NOT AT ALL

## 2025-06-23 ASSESSMENT — LIFESTYLE VARIABLES
SKIP TO QUESTIONS 9-10: 1
AUDIT-C TOTAL SCORE: 2

## 2025-06-23 ASSESSMENT — PAIN SCALES - GENERAL: PAINLEVEL_OUTOF10: 10-WORST PAIN EVER

## 2025-06-23 ASSESSMENT — ACTIVITIES OF DAILY LIVING (ADL): LACK_OF_TRANSPORTATION: NO

## 2025-06-24 PROCEDURE — RXMED WILLOW AMBULATORY MEDICATION CHARGE

## 2025-06-25 ENCOUNTER — PHARMACY VISIT (OUTPATIENT)
Dept: PHARMACY | Facility: CLINIC | Age: 60
End: 2025-06-25
Payer: MEDICAID

## 2025-06-26 ENCOUNTER — APPOINTMENT (OUTPATIENT)
Dept: PRIMARY CARE | Facility: CLINIC | Age: 60
End: 2025-06-26
Payer: COMMERCIAL

## 2025-06-26 ENCOUNTER — HOSPITAL ENCOUNTER (OUTPATIENT)
Dept: GASTROENTEROLOGY | Facility: HOSPITAL | Age: 60
Discharge: HOME | End: 2025-06-26
Payer: COMMERCIAL

## 2025-06-26 ENCOUNTER — ANESTHESIA (OUTPATIENT)
Dept: GASTROENTEROLOGY | Facility: HOSPITAL | Age: 60
End: 2025-06-26
Payer: COMMERCIAL

## 2025-06-26 ENCOUNTER — ANESTHESIA EVENT (OUTPATIENT)
Dept: GASTROENTEROLOGY | Facility: HOSPITAL | Age: 60
End: 2025-06-26
Payer: COMMERCIAL

## 2025-06-26 ENCOUNTER — APPOINTMENT (OUTPATIENT)
Dept: GASTROENTEROLOGY | Facility: HOSPITAL | Age: 60
End: 2025-06-26
Payer: COMMERCIAL

## 2025-06-26 VITALS
RESPIRATION RATE: 18 BRPM | TEMPERATURE: 95.7 F | BODY MASS INDEX: 18.48 KG/M2 | HEIGHT: 66 IN | OXYGEN SATURATION: 95 % | WEIGHT: 115 LBS | SYSTOLIC BLOOD PRESSURE: 112 MMHG | DIASTOLIC BLOOD PRESSURE: 88 MMHG | HEART RATE: 86 BPM

## 2025-06-26 DIAGNOSIS — Z12.11 SCREENING FOR COLORECTAL CANCER: ICD-10-CM

## 2025-06-26 DIAGNOSIS — D21.4 BENIGN GASTROINTESTINAL STROMAL TUMOR (GIST): ICD-10-CM

## 2025-06-26 DIAGNOSIS — C49.A2 MALIGNANT GASTROINTESTINAL STROMAL TUMOR (GIST) OF STOMACH: ICD-10-CM

## 2025-06-26 DIAGNOSIS — Z12.12 SCREENING FOR COLORECTAL CANCER: ICD-10-CM

## 2025-06-26 PROCEDURE — 45385 COLONOSCOPY W/LESION REMOVAL: CPT | Mod: 22 | Performed by: INTERNAL MEDICINE

## 2025-06-26 PROCEDURE — 7100000010 HC PHASE TWO TIME - EACH INCREMENTAL 1 MINUTE

## 2025-06-26 PROCEDURE — 2500000004 HC RX 250 GENERAL PHARMACY W/ HCPCS (ALT 636 FOR OP/ED): Mod: SE | Performed by: ANESTHESIOLOGY

## 2025-06-26 PROCEDURE — A45385 PR COLONOSCOPY,REMV LESN,SNARE: Performed by: ANESTHESIOLOGY

## 2025-06-26 PROCEDURE — 2500000004 HC RX 250 GENERAL PHARMACY W/ HCPCS (ALT 636 FOR OP/ED): Mod: SE

## 2025-06-26 PROCEDURE — A45385 PR COLONOSCOPY,REMV LESN,SNARE

## 2025-06-26 PROCEDURE — 3700000001 HC GENERAL ANESTHESIA TIME - INITIAL BASE CHARGE

## 2025-06-26 PROCEDURE — 3700000002 HC GENERAL ANESTHESIA TIME - EACH INCREMENTAL 1 MINUTE

## 2025-06-26 PROCEDURE — 2500000005 HC RX 250 GENERAL PHARMACY W/O HCPCS: Mod: SE

## 2025-06-26 PROCEDURE — 43239 EGD BIOPSY SINGLE/MULTIPLE: CPT | Performed by: INTERNAL MEDICINE

## 2025-06-26 PROCEDURE — 7100000009 HC PHASE TWO TIME - INITIAL BASE CHARGE

## 2025-06-26 RX ORDER — SODIUM CHLORIDE, SODIUM LACTATE, POTASSIUM CHLORIDE, CALCIUM CHLORIDE 600; 310; 30; 20 MG/100ML; MG/100ML; MG/100ML; MG/100ML
INJECTION, SOLUTION INTRAVENOUS CONTINUOUS PRN
Status: DISCONTINUED | OUTPATIENT
Start: 2025-06-26 | End: 2025-06-26

## 2025-06-26 RX ORDER — LIDOCAINE IN NACL,ISO-OSMOT/PF 30 MG/3 ML
0.1 SYRINGE (ML) INJECTION ONCE
Status: DISCONTINUED | OUTPATIENT
Start: 2025-06-26 | End: 2025-06-27 | Stop reason: HOSPADM

## 2025-06-26 RX ORDER — MIDAZOLAM HYDROCHLORIDE 1 MG/ML
INJECTION INTRAMUSCULAR; INTRAVENOUS AS NEEDED
Status: DISCONTINUED | OUTPATIENT
Start: 2025-06-26 | End: 2025-06-26

## 2025-06-26 RX ORDER — LIDOCAINE HCL/PF 100 MG/5ML
SYRINGE (ML) INTRAVENOUS AS NEEDED
Status: DISCONTINUED | OUTPATIENT
Start: 2025-06-26 | End: 2025-06-26

## 2025-06-26 RX ORDER — LABETALOL HYDROCHLORIDE 5 MG/ML
5 INJECTION, SOLUTION INTRAVENOUS ONCE AS NEEDED
Status: DISCONTINUED | OUTPATIENT
Start: 2025-06-26 | End: 2025-06-27 | Stop reason: HOSPADM

## 2025-06-26 RX ORDER — OXYCODONE HYDROCHLORIDE 5 MG/1
10 TABLET ORAL EVERY 4 HOURS PRN
Status: DISCONTINUED | OUTPATIENT
Start: 2025-06-26 | End: 2025-06-27 | Stop reason: HOSPADM

## 2025-06-26 RX ORDER — METOCLOPRAMIDE HYDROCHLORIDE 5 MG/ML
10 INJECTION INTRAMUSCULAR; INTRAVENOUS ONCE AS NEEDED
Status: COMPLETED | OUTPATIENT
Start: 2025-06-26 | End: 2025-06-26

## 2025-06-26 RX ORDER — GLYCOPYRROLATE 0.2 MG/ML
INJECTION INTRAMUSCULAR; INTRAVENOUS AS NEEDED
Status: DISCONTINUED | OUTPATIENT
Start: 2025-06-26 | End: 2025-06-26

## 2025-06-26 RX ORDER — OXYCODONE AND ACETAMINOPHEN 5; 325 MG/1; MG/1
1 TABLET ORAL EVERY 4 HOURS PRN
Status: DISCONTINUED | OUTPATIENT
Start: 2025-06-26 | End: 2025-06-27 | Stop reason: HOSPADM

## 2025-06-26 RX ORDER — SODIUM CHLORIDE, SODIUM LACTATE, POTASSIUM CHLORIDE, CALCIUM CHLORIDE 600; 310; 30; 20 MG/100ML; MG/100ML; MG/100ML; MG/100ML
100 INJECTION, SOLUTION INTRAVENOUS CONTINUOUS
Status: DISCONTINUED | OUTPATIENT
Start: 2025-06-26 | End: 2025-06-27 | Stop reason: HOSPADM

## 2025-06-26 RX ORDER — ACETAMINOPHEN 325 MG/1
650 TABLET ORAL EVERY 4 HOURS PRN
Status: DISCONTINUED | OUTPATIENT
Start: 2025-06-26 | End: 2025-06-27 | Stop reason: HOSPADM

## 2025-06-26 RX ORDER — DROPERIDOL 2.5 MG/ML
0.62 INJECTION, SOLUTION INTRAMUSCULAR; INTRAVENOUS ONCE AS NEEDED
Status: DISCONTINUED | OUTPATIENT
Start: 2025-06-26 | End: 2025-06-27 | Stop reason: HOSPADM

## 2025-06-26 RX ORDER — HYDROMORPHONE HYDROCHLORIDE 1 MG/ML
0.5 INJECTION, SOLUTION INTRAMUSCULAR; INTRAVENOUS; SUBCUTANEOUS EVERY 5 MIN PRN
Status: DISCONTINUED | OUTPATIENT
Start: 2025-06-26 | End: 2025-06-27 | Stop reason: HOSPADM

## 2025-06-26 RX ORDER — PROPOFOL 10 MG/ML
INJECTION, EMULSION INTRAVENOUS AS NEEDED
Status: DISCONTINUED | OUTPATIENT
Start: 2025-06-26 | End: 2025-06-26

## 2025-06-26 RX ORDER — PHENYLEPHRINE HCL IN 0.9% NACL 0.4MG/10ML
SYRINGE (ML) INTRAVENOUS AS NEEDED
Status: DISCONTINUED | OUTPATIENT
Start: 2025-06-26 | End: 2025-06-26

## 2025-06-26 RX ORDER — MIDAZOLAM HYDROCHLORIDE 1 MG/ML
1 INJECTION INTRAMUSCULAR; INTRAVENOUS ONCE AS NEEDED
Status: DISCONTINUED | OUTPATIENT
Start: 2025-06-26 | End: 2025-06-27 | Stop reason: HOSPADM

## 2025-06-26 RX ORDER — HYDROMORPHONE HYDROCHLORIDE 1 MG/ML
0.2 INJECTION, SOLUTION INTRAMUSCULAR; INTRAVENOUS; SUBCUTANEOUS EVERY 5 MIN PRN
Status: DISCONTINUED | OUTPATIENT
Start: 2025-06-26 | End: 2025-06-27 | Stop reason: HOSPADM

## 2025-06-26 RX ADMIN — LIDOCAINE HYDROCHLORIDE 70 MG: 20 INJECTION INTRAVENOUS at 09:59

## 2025-06-26 RX ADMIN — BENZOCAINE, BUTAMBEN, AND TETRACAINE HYDROCHLORIDE 1 SPRAY: .028; .004; .004 AEROSOL, SPRAY TOPICAL at 09:59

## 2025-06-26 RX ADMIN — PROPOFOL 200 MCG/KG/MIN: 10 INJECTION, EMULSION INTRAVENOUS at 10:01

## 2025-06-26 RX ADMIN — PROPOFOL 30 MG: 10 INJECTION, EMULSION INTRAVENOUS at 10:07

## 2025-06-26 RX ADMIN — MIDAZOLAM HYDROCHLORIDE 2 MG: 2 INJECTION, SOLUTION INTRAMUSCULAR; INTRAVENOUS at 09:58

## 2025-06-26 RX ADMIN — PROPOFOL 20 MG: 10 INJECTION, EMULSION INTRAVENOUS at 10:53

## 2025-06-26 RX ADMIN — SODIUM CHLORIDE, POTASSIUM CHLORIDE, SODIUM LACTATE AND CALCIUM CHLORIDE: 600; 310; 30; 20 INJECTION, SOLUTION INTRAVENOUS at 09:58

## 2025-06-26 RX ADMIN — PROPOFOL 50 MG: 10 INJECTION, EMULSION INTRAVENOUS at 10:02

## 2025-06-26 RX ADMIN — GLYCOPYRROLATE 0.1 MG: 0.2 INJECTION, SOLUTION INTRAMUSCULAR; INTRAVENOUS at 09:58

## 2025-06-26 RX ADMIN — METOCLOPRAMIDE 10 MG: 5 INJECTION, SOLUTION INTRAMUSCULAR; INTRAVENOUS at 11:37

## 2025-06-26 RX ADMIN — Medication 160 MCG: at 10:20

## 2025-06-26 ASSESSMENT — PAIN SCALES - GENERAL
PAINLEVEL_OUTOF10: 0 - NO PAIN

## 2025-06-26 ASSESSMENT — PAIN - FUNCTIONAL ASSESSMENT: PAIN_FUNCTIONAL_ASSESSMENT: 0-10

## 2025-06-26 NOTE — H&P
"History Of Present Illness  Joe Alaniz is a 59 y.o. male presenting here for open-access EGD for follow up of attempted endoscopic resection of benign gastric GIST via poly-loops 10/24/24 (Jaimie Cardozo/Lemuel) with repeat EGD recommended in 3 months (January 2025) and open-access colonoscopy for \"high risk\". However, the patient denies family history of colon polyps or colon cancer. Unclear from notes what is meant by \"high risk\".     Past Medical History  Medical History[1]  Surgical History  Surgical History[2]  Social History  He reports that he quit smoking about a year ago. His smoking use included cigarettes. He started smoking about 42 years ago. He has a 41.5 pack-year smoking history. He has never used smokeless tobacco. He reports current alcohol use. He reports that he does not currently use drugs.    Family History  Family History[3]     Allergies  Allergies[4]       Physical Exam  Constitutional:       Appearance: He is underweight.   HENT:      Mouth/Throat:      Mouth: Mucous membranes are moist.   Eyes:      Conjunctiva/sclera: Conjunctivae normal.   Cardiovascular:      Rate and Rhythm: Normal rate.   Pulmonary:      Effort: Pulmonary effort is normal.   Abdominal:      Palpations: Abdomen is soft.   Skin:     General: Skin is warm.   Neurological:      Mental Status: He is oriented to person, place, and time.   Psychiatric:         Mood and Affect: Mood normal.          Last Recorded Vitals  Blood pressure (!) 145/93, pulse 96, temperature 37.2 °C (98.9 °F), temperature source Temporal, resp. rate 16, height 1.676 m (5' 6\"), weight 52.2 kg (115 lb), SpO2 96%.    Assessment/Plan   open-access EGD for follow up of attempted endoscopic resection of benign gastric GIST via poly-loops 10/24/24 (Jaimie Cardozo/Lemuel) with repeat EGD recommended in 3 months (January 2025) and open-access colonoscopy for \"high risk\". However, the patient denies family history of colon polyps or colon cancer. Unclear from " "notes what is meant by \"high risk\".     Kip Ramos MD       [1]   Past Medical History:  Diagnosis Date    BPH (benign prostatic hyperplasia)     Dental caries     Esophagitis determined by endoscopy     Failure to thrive in adult     Former smoker     Gastritis     GERD (gastroesophageal reflux disease)     GIST, malignant (Multi)     HTN (hypertension)     PUD (peptic ulcer disease)     Syncope     Unintentional weight loss    [2]   Past Surgical History:  Procedure Laterality Date    ESOPHAGOGASTRODUODENOSCOPY      gastric mass biopsy    HERNIA REPAIR Left 08/14/2024    left inguinal hernia repair with dr hernandez    SMALL INTESTINE SURGERY      PUD w/ perforated ulcer, s/p surgical resection    WISDOM TOOTH EXTRACTION     [3]   Family History  Problem Relation Name Age of Onset    Cancer Father      Liver cancer Father's Brother      Cancer Paternal Grandmother     [4]   Allergies  Allergen Reactions    Erythromycin Hives     "

## 2025-06-26 NOTE — ANESTHESIA PREPROCEDURE EVALUATION
Patient: Joe Alaniz    Procedure Information       Date/Time: 06/26/25 1000    Scheduled providers: Kip Ramos MD; Gael Ferreira RN    Procedures:       COLONOSCOPY      EGD    Location: [facility]            Relevant Problems   Cardiac   (+) HTN (hypertension)      Neuro   (+) Peripheral neuropathy      /Renal   (+) Kidney stones, calcium oxalate       Clinical information reviewed:   Tobacco  Allergies  Meds  Problems  Med Hx  Surg Hx   Fam Hx  Soc   Hx        NPO Detail:  NPO/Void Status  Date of Last Liquid: 06/26/25  Time of Last Liquid: 0230  Date of Last Solid: 06/25/25  Time of Last Solid: 1600 (patient states he had fetticine)         Physical Exam    Airway  Mallampati: II  TM distance: >3 FB  Neck ROM: full     Cardiovascular - normal exam   Dental - normal exam     Pulmonary - normal exam   Abdominal            Anesthesia Plan    History of general anesthesia?: yes  History of complications of general anesthesia?: no    ASA 3     general     intravenous induction   Anesthetic plan and risks discussed with patient.    Plan discussed with CAA and attending.

## 2025-06-26 NOTE — DISCHARGE INSTRUCTIONS

## 2025-07-02 DIAGNOSIS — C49.A4 GASTROINTESTINAL STROMAL TUMOR OF LARGE INTESTINE: Primary | ICD-10-CM

## 2025-07-03 DIAGNOSIS — C49.A4 GASTROINTESTINAL STROMAL TUMOR OF LARGE INTESTINE: ICD-10-CM

## 2025-07-03 LAB
LABORATORY COMMENT REPORT: NORMAL
PATH REPORT.FINAL DX SPEC: NORMAL
PATH REPORT.GROSS SPEC: NORMAL
PATH REPORT.MICROSCOPIC SPEC OTHER STN: NORMAL
PATH REPORT.RELEVANT HX SPEC: NORMAL
PATH REPORT.TOTAL CANCER: NORMAL

## 2025-07-03 NOTE — RESULT ENCOUNTER NOTE
A  [employer] message was sent to patient regarding the results and recommendations as follows:         Dear Mr. Alaniz,        I am writing to inform you that the biopsies from your stomach showed mild, non-specific chronic inflammation which can be from medication-effect or other benign etiologies. There were no signs of the GIST tumor you had removed by Dr. Cardozo. There were no signs of cancer or H pylori infection.       The polyps that we removed from your colon revealed benign pre-cancerous polyps. The polyps did not have cancer in them. As you are aware, your next surveillance colonoscopy with a 2-day preparation should be done in 3 years.         Copies of all the reports were sent to your referring nurse practitioner, [place] NP, your gastroenterology physician assistant, SWEETIE Marley, and your primary care physician, Nilo Ayon, DO. Please follow up with them for further evaluation and management.        If you have any questions regarding your open-access endoscopy and/or pathology results, please do not hesitate to contact me by replying to this message or calling me at [phone].  Thank you for allowing us to participate in your medical care.         Sincerely,         Kip Ramos MD, MARIAM     Chief Medical      [facility]     Associate Chief and Director of Clinical Operations     Division of Gastroenterology and Liver Disease      Master Clinician     [facility]     Professor of Medicine     [facility]

## 2025-07-07 ENCOUNTER — TELEPHONE (OUTPATIENT)
Dept: GASTROENTEROLOGY | Facility: HOSPITAL | Age: 60
End: 2025-07-07
Payer: COMMERCIAL

## 2025-07-07 ENCOUNTER — APPOINTMENT (OUTPATIENT)
Dept: HEMATOLOGY/ONCOLOGY | Facility: HOSPITAL | Age: 60
End: 2025-07-07
Payer: COMMERCIAL

## 2025-07-07 NOTE — TELEPHONE ENCOUNTER
Called pt today to discuss EGD and Colonoscopy results from 6/26/25.  EGD - showed some mild gastritis (likely from meds). Neg H.pylori. Neg recurrence of GIST tumor.  Colonoscopy - 3 Tas.  Will need repeat surveillance in 3 yrs (2028). With anesth, 2 day prep  and ped scope.    Pt did not answer so voicemail left to call back so we can go over results in detail.

## 2025-07-23 ENCOUNTER — APPOINTMENT (OUTPATIENT)
Dept: PRIMARY CARE | Facility: CLINIC | Age: 60
End: 2025-07-23
Payer: COMMERCIAL

## 2025-09-25 ENCOUNTER — APPOINTMENT (OUTPATIENT)
Dept: PRIMARY CARE | Facility: CLINIC | Age: 60
End: 2025-09-25
Payer: COMMERCIAL

## (undated) DEVICE — SUTURE, PROLENE, 0, 30 IN, SH

## (undated) DEVICE — SUTURE, VICRYL, 3-0, 27 IN, SH, VIOLET

## (undated) DEVICE — SOLUTION, IRRIGATION, SODIUM CHLORIDE 0.9%, 1000 ML, POUR BOTTLE

## (undated) DEVICE — PAD, GROUNDING, ELECTROSURGICAL, W/9 FT CABLE, POLYHESIVE II, ADULT, LF

## (undated) DEVICE — TOWEL, SURGICAL, NEURO, O/R, 16 X 26, BLUE, STERILE

## (undated) DEVICE — DRAPE, SHEET, ENDOSCOPY, GENERAL, FENESTRATED, ARMBOARD COVER, 98 X 123.5 IN, DISPOSABLE, LF, STERILE

## (undated) DEVICE — SUTURE, VICRYL, 3-0, 27 IN, SH

## (undated) DEVICE — SUTURE, VICRYL, 0, SH 27 TAPER NEEDLE, UNDYED, BRAIDED

## (undated) DEVICE — Device

## (undated) DEVICE — SUTURE, MONOCRYL, 4-0, 18 IN, PS2, UNDYED

## (undated) DEVICE — ADHESIVE, SKIN, LIQUIBAND EXCEED

## (undated) DEVICE — SUTURE, PROLENE, 2-0, 30 IN, SH, BLUE

## (undated) DEVICE — SUTURE, PROLENE, 3-0, 30 IN, SH

## (undated) DEVICE — SUTURE, VICRYL, 2-0, 36 IN, CT-1, UNDYED

## (undated) DEVICE — SUTURE, SILK, 2-0, TIES, 12-30 IN, BLACK